# Patient Record
Sex: FEMALE | Race: WHITE | NOT HISPANIC OR LATINO | Employment: OTHER | URBAN - NONMETROPOLITAN AREA
[De-identification: names, ages, dates, MRNs, and addresses within clinical notes are randomized per-mention and may not be internally consistent; named-entity substitution may affect disease eponyms.]

---

## 2017-03-07 ENCOUNTER — APPOINTMENT (OUTPATIENT)
Dept: LAB | Facility: HOSPITAL | Age: 82
End: 2017-03-07

## 2017-03-07 ENCOUNTER — OFFICE VISIT (OUTPATIENT)
Dept: GASTROENTEROLOGY | Facility: CLINIC | Age: 82
End: 2017-03-07

## 2017-03-07 VITALS
OXYGEN SATURATION: 98 % | HEIGHT: 68 IN | HEART RATE: 74 BPM | BODY MASS INDEX: 24.71 KG/M2 | SYSTOLIC BLOOD PRESSURE: 132 MMHG | WEIGHT: 163 LBS | DIASTOLIC BLOOD PRESSURE: 80 MMHG

## 2017-03-07 DIAGNOSIS — Z78.9 NONSMOKER: ICD-10-CM

## 2017-03-07 DIAGNOSIS — I10 HTN (HYPERTENSION), BENIGN: ICD-10-CM

## 2017-03-07 DIAGNOSIS — E11.9 CONTROLLED TYPE 2 DIABETES MELLITUS WITHOUT COMPLICATION, WITHOUT LONG-TERM CURRENT USE OF INSULIN (HCC): ICD-10-CM

## 2017-03-07 DIAGNOSIS — K62.5 RECTAL BLEEDING: ICD-10-CM

## 2017-03-07 DIAGNOSIS — R19.7 DIARRHEA IN ADULT PATIENT: Primary | ICD-10-CM

## 2017-03-07 LAB
ADV 40+41 DNA STL QL NAA+NON-PROBE: NOT DETECTED
ASTRO TYP 1-8 RNA STL QL NAA+NON-PROBE: NOT DETECTED
C CAYETANENSIS DNA STL QL NAA+NON-PROBE: NOT DETECTED
C DIFF TOX GENS STL QL NAA+PROBE: DETECTED
CAMPY SP DNA.DIARRHEA STL QL NAA+PROBE: NOT DETECTED
CRYPTOSP STL CULT: NOT DETECTED
E COLI DNA SPEC QL NAA+PROBE: NOT DETECTED
E HISTOLYT AG STL-ACNC: NOT DETECTED
EAEC PAA PLAS AGGR+AATA ST NAA+NON-PRB: NOT DETECTED
EC STX1+STX2 GENES STL QL NAA+NON-PROBE: NOT DETECTED
EPEC EAE GENE STL QL NAA+NON-PROBE: NOT DETECTED
ETEC LTA+ST1A+ST1B TOX ST NAA+NON-PROBE: NOT DETECTED
G LAMBLIA DNA SPEC QL NAA+PROBE: NOT DETECTED
NOROVIRUS GI+II RNA STL QL NAA+NON-PROBE: NOT DETECTED
P SHIGELLOIDES DNA STL QL NAA+NON-PROBE: NOT DETECTED
RV RNA STL NAA+PROBE: NOT DETECTED
SALMONELLA DNA SPEC QL NAA+PROBE: NOT DETECTED
SAPO I+II+IV+V RNA STL QL NAA+NON-PROBE: NOT DETECTED
SHIGELLA SP+EIEC IPAH ST NAA+NON-PROBE: NOT DETECTED
V CHOLERAE DNA SPEC QL NAA+PROBE: NOT DETECTED
VIBRIO DNA SPEC NAA+PROBE: NOT DETECTED
YERSINIA STL CULT: NOT DETECTED

## 2017-03-07 PROCEDURE — 99204 OFFICE O/P NEW MOD 45 MIN: CPT | Performed by: CLINICAL NURSE SPECIALIST

## 2017-03-07 PROCEDURE — 87507 IADNA-DNA/RNA PROBE TQ 12-25: CPT | Performed by: CLINICAL NURSE SPECIALIST

## 2017-03-07 PROCEDURE — 87205 SMEAR GRAM STAIN: CPT | Performed by: CLINICAL NURSE SPECIALIST

## 2017-03-07 PROCEDURE — 87999 UNLISTED MICROBIOLOGY PX: CPT | Performed by: CLINICAL NURSE SPECIALIST

## 2017-03-07 RX ORDER — UBIDECARENONE 75 MG
CAPSULE ORAL
COMMUNITY
End: 2018-10-25

## 2017-03-07 RX ORDER — CLONIDINE HYDROCHLORIDE 0.2 MG/1
TABLET ORAL
Status: ON HOLD | COMMUNITY
Start: 2015-08-16 | End: 2020-04-02

## 2017-03-07 RX ORDER — M-VIT,TX,IRON,MINS/CALC/FOLIC 27MG-0.4MG
TABLET ORAL
COMMUNITY
End: 2018-10-25

## 2017-03-07 RX ORDER — LISINOPRIL 20 MG/1
TABLET ORAL
Status: ON HOLD | COMMUNITY
End: 2020-04-02

## 2017-03-07 RX ORDER — METHOCARBAMOL 500 MG/1
TABLET, FILM COATED ORAL
COMMUNITY
Start: 2015-08-17 | End: 2018-10-25

## 2017-03-07 RX ORDER — CAYENNE 450 MG
CAPSULE ORAL
COMMUNITY
End: 2018-10-25

## 2017-03-07 RX ORDER — PREGABALIN 50 MG/1
CAPSULE ORAL
COMMUNITY
End: 2018-10-25 | Stop reason: DRUGHIGH

## 2017-03-07 RX ORDER — METFORMIN HYDROCHLORIDE 500 MG/1
TABLET, EXTENDED RELEASE ORAL
Status: ON HOLD | COMMUNITY
End: 2020-04-02

## 2017-03-07 RX ORDER — CLONIDINE HYDROCHLORIDE 0.1 MG/1
TABLET ORAL
COMMUNITY
End: 2018-10-25 | Stop reason: DRUGHIGH

## 2017-03-07 RX ORDER — METOPROLOL SUCCINATE 25 MG/1
TABLET, EXTENDED RELEASE ORAL
COMMUNITY
End: 2021-02-19

## 2017-03-07 RX ORDER — CLOBETASOL PROPIONATE 0.5 MG/G
OINTMENT TOPICAL
COMMUNITY
Start: 2016-11-03 | End: 2018-10-25

## 2017-03-07 NOTE — PROGRESS NOTES
Chief Complaint   Patient presents with   • GI Problem     Diarrhea     Subjective   HPI  Hannah Dasilva is a 87 y.o. female who presents with a complaint of diarrhea with a severe episode on Saturday. She has been having loose bowels worsening over the past year. She says that she will go up to 4 or 5 times. She says that there may have been some BRBPR that was of small amount on the tissue. She denies any abdominal pain. No nausea or vomiting. No recent antibiotics. No NSAIDs. No wt loss. She does take metformin. Her son has a hx of multiple polyps.   She has had a colonoscopy in 2010 reviewed today. She was notified of repeat in 2015 and she did not want to come in.    Past Medical History   Diagnosis Date   • Disc degeneration, lumbar    • History of adenomatous polyp of colon    • Hypercholesterolemia    • Irregular heartbeat    • MVP (mitral valve prolapse)    • Osteoarthritis    • Sciatica      Past Surgical History   Procedure Laterality Date   • Hysterectomy     • Tonsillectomy     • Appendectomy     • Bladder repair     • Colonoscopy  02/23/2010     diverticulosis   • Colonoscopy w/ polypectomy  11/06/2006     ASCENDING COLON ADENOMATOUS POLYP   • Endoscopy  10/04/2006     SBBX UNREMARKABLE, SMALL HH, UREA NEG     Outpatient Prescriptions Marked as Taking for the 3/7/17 encounter (Office Visit) with TOY Loera   Medication Sig Dispense Refill   • CALCIUM CITRATE-VITAMIN D Take  by mouth daily.       • clobetasol (TEMOVATE) 0.05 % ointment Apply topically 2 times daily for one week, then once daily for a week, then twice weekly     • CloNIDine (CATAPRES) 0.1 MG tablet Take 0.1 mg by mouth 2 times daily.       • CloNIDine (CATAPRES) 0.2 MG tablet      • ESTRADIOL, VAGINAL ESTROGENS, Place 25 mcg vaginally three times a week.       • Lactobacillus (ACIDOPHILUS) chewable tablet Take  by mouth.       • lisinopril (PRINIVIL,ZESTRIL) 20 MG tablet Take 20 mg by mouth daily.       • metFORMIN ER  (GLUCOPHAGE-XR) 500 MG 24 hr tablet Take 500 mg by mouth daily (with breakfast).       • methocarbamol (ROBAXIN) 500 MG tablet      • metoprolol succinate XL (TOPROL-XL) 25 MG 24 hr tablet Take 25 mg by mouth daily.       • pregabalin (LYRICA) 50 MG capsule Take 75 mg by mouth 3 times daily.       • therapeutic multivitamin-minerals (THERAGRAN-M) tablet Take 1 tablet by mouth daily.       • vitamin B-12 (CYANOCOBALAMIN) 100 MCG tablet Take 50 mcg by mouth daily.         Allergies   Allergen Reactions   • Penicillins    • Quinine Derivatives    • Sulfa Antibiotics      Social History     Social History   • Marital status:      Spouse name: N/A   • Number of children: N/A   • Years of education: N/A     Occupational History   • Not on file.     Social History Main Topics   • Smoking status: Never Smoker   • Smokeless tobacco: Not on file   • Alcohol use Yes      Comment: wine occasional   • Drug use: No   • Sexual activity: Not on file     Other Topics Concern   • Not on file     Social History Narrative     Family History   Problem Relation Age of Onset   • Colon polyps Son    • GI problems Neg Hx    • Colon cancer Neg Hx      Health Maintenance   Topic Date Due   • MEDICARE ANNUAL WELLNESS  11/27/1929   • TDAP/TD VACCINES (1 - Tdap) 11/27/1948   • ZOSTER VACCINE  11/27/1989   • PNEUMOCOCCAL VACCINES (65+ LOW/MEDIUM RISK) (1 of 2 - PCV13) 11/27/1994   • INFLUENZA VACCINE  08/01/2016     Review of Systems   Constitutional: Negative for activity change, appetite change, chills, diaphoresis, fatigue, fever and unexpected weight change.   HENT: Negative for ear pain, hearing loss, mouth sores, sore throat, trouble swallowing and voice change.    Eyes: Negative.    Respiratory: Negative for cough, choking, shortness of breath and wheezing.    Cardiovascular: Negative for chest pain and palpitations.   Gastrointestinal: Positive for blood in stool and diarrhea. Negative for abdominal pain, constipation, nausea and  "vomiting.   Endocrine: Negative for cold intolerance and heat intolerance.   Genitourinary: Negative for decreased urine volume, dysuria, frequency, hematuria and urgency.   Musculoskeletal: Negative for back pain, gait problem and myalgias.   Skin: Negative for color change, pallor and rash.   Allergic/Immunologic: Negative for food allergies and immunocompromised state.   Neurological: Negative for dizziness, tremors, seizures, syncope, weakness, light-headedness, numbness and headaches.   Hematological: Negative for adenopathy. Does not bruise/bleed easily.   Psychiatric/Behavioral: Negative for agitation and confusion. The patient is not nervous/anxious.    All other systems reviewed and are negative.    Objective   Vitals:    03/07/17 1250   BP: 132/80   Pulse: 74   SpO2: 98%   Weight: 163 lb (73.9 kg)   Height: 68\" (172.7 cm)     Body mass index is 24.78 kg/(m^2).  Physical Exam   Constitutional: She is oriented to person, place, and time. She appears well-developed and well-nourished.   HENT:   Head: Normocephalic and atraumatic.   Eyes: Pupils are equal, round, and reactive to light.   Neck: Normal range of motion. Neck supple. No tracheal deviation present.   Cardiovascular: Normal rate, regular rhythm and normal heart sounds.  Exam reveals no gallop and no friction rub.    No murmur heard.  Pulmonary/Chest: Effort normal and breath sounds normal. No respiratory distress. She has no wheezes. She has no rales. She exhibits no tenderness.   Abdominal: Soft. Bowel sounds are normal. She exhibits no distension. There is no hepatosplenomegaly. There is no tenderness. There is no rigidity, no rebound and no guarding.   Musculoskeletal: Normal range of motion. She exhibits no edema, tenderness or deformity.   Neurological: She is alert and oriented to person, place, and time. She has normal reflexes.   Skin: Skin is warm and dry. No rash noted. No pallor.   Psychiatric: She has a normal mood and affect. Her " behavior is normal. Judgment and thought content normal.     Assessment/Plan   Hannah was seen today for gi problem.    Diagnoses and all orders for this visit:    Diarrhea in adult patient  -     Gastrointestinal Panel, PCR  -     Fecal Leukocytes  -     polyethylene glycol (GoLYTELY) 236 G solution; Take as directed by office instructions.  -     Case Request; Standing  -     Implement Anesthesia Orders Day of Procedure; Standing  -     Obtain Informed Consent; Standing  -     Verify Informed Consent; Standing  -     Verify bowel prep was successful; Standing  -     Case Request    Rectal bleeding  Comments:  small amount on the tissue.     Nonsmoker    HTN (hypertension), benign  Comments:  continue BP medication the am of procedure    Controlled type 2 diabetes mellitus without complication, without long-term current use of insulin    I suggested that this could be her metformin and/or dietary related. I also will get stool cultures to rule out infectious source. The other concern is ischemic colitis. I suggested that she come to the ER with any severe episodes or further bleeding.     COLONOSCOPY WITH ANESTHESIA (N/A)  EMR Dragon/transcription disclaimer: Much of this encounter note is electronic transcription/translation of spoken language to printed text. The electronic translation of spoken language may be erroneous, or at times, nonsensical words or phrases may be inadvertently transcribed. Although I have reviewed the note for such errors, some may still exist.  Body mass index is 24.78 kg/(m^2).  Return if symptoms worsen or fail to improve.      All risks, benefits, alternatives, and indications of colonoscopy and/or Endoscopy procedure have been discussed with the patient. Risks to include perforation of the colon requiring possible surgery or colostomy, risk of bleeding from biopsies or removal of colon tissue, possibility of missing a colon polyp or cancer, or adverse drug reaction.  Benefits to  include the diagnosis and management of disease of the colon and rectum. Alternatives to include barium enema, radiographic evaluation, lab testing or no intervention. Pt verbalizes understanding and agrees.

## 2017-03-08 DIAGNOSIS — A04.72 COLITIS, CLOSTRIDIUM DIFFICILE: Primary | ICD-10-CM

## 2017-03-08 LAB — WBC STL QL MICRO: NORMAL

## 2017-03-08 RX ORDER — METRONIDAZOLE 500 MG/1
500 TABLET ORAL 3 TIMES DAILY
Qty: 30 TABLET | Refills: 0 | Status: SHIPPED | OUTPATIENT
Start: 2017-03-08 | End: 2018-10-25

## 2017-06-13 ENCOUNTER — TELEPHONE (OUTPATIENT)
Dept: GASTROENTEROLOGY | Facility: CLINIC | Age: 82
End: 2017-06-13

## 2017-06-13 NOTE — TELEPHONE ENCOUNTER
Patient called stating now Dr. Mcdermott office is calling her about scheduling colonoscopy she said she informed us already that she isn't having any problems at this time and doesn't want to schedule a colon.

## 2017-07-10 ENCOUNTER — OFFICE VISIT (OUTPATIENT)
Dept: OBGYN | Age: 82
End: 2017-07-10
Payer: MEDICARE

## 2017-07-10 VITALS
HEART RATE: 64 BPM | BODY MASS INDEX: 23.19 KG/M2 | SYSTOLIC BLOOD PRESSURE: 118 MMHG | WEIGHT: 153 LBS | HEIGHT: 68 IN | DIASTOLIC BLOOD PRESSURE: 70 MMHG | TEMPERATURE: 98.7 F

## 2017-07-10 DIAGNOSIS — N76.0 ACUTE VAGINITIS: ICD-10-CM

## 2017-07-10 DIAGNOSIS — Z01.411 ENCOUNTER FOR GYNECOLOGICAL EXAMINATION (GENERAL) (ROUTINE) WITH ABNORMAL FINDINGS: Primary | ICD-10-CM

## 2017-07-10 DIAGNOSIS — L90.0 LICHEN SCLEROSUS: ICD-10-CM

## 2017-07-10 DIAGNOSIS — Z12.31 ENCOUNTER FOR SCREENING MAMMOGRAM FOR MALIGNANT NEOPLASM OF BREAST: ICD-10-CM

## 2017-07-10 PROCEDURE — G0101 CA SCREEN;PELVIC/BREAST EXAM: HCPCS

## 2017-07-10 PROCEDURE — 99213 OFFICE O/P EST LOW 20 MIN: CPT

## 2017-07-10 RX ORDER — CLOBETASOL PROPIONATE 0.5 MG/G
OINTMENT TOPICAL
Qty: 45 G | Refills: 1 | Status: ON HOLD | OUTPATIENT
Start: 2017-07-10 | End: 2017-08-22

## 2017-07-10 RX ORDER — NYSTATIN 100000 U/G
CREAM TOPICAL
Qty: 1 TUBE | Refills: 1 | Status: SHIPPED | OUTPATIENT
Start: 2017-07-10 | End: 2017-07-14 | Stop reason: ALTCHOICE

## 2017-07-10 RX ORDER — FLUCONAZOLE 100 MG/1
100 TABLET ORAL ONCE
Qty: 2 TABLET | Refills: 1 | Status: SHIPPED | OUTPATIENT
Start: 2017-07-10 | End: 2017-07-10

## 2017-07-10 RX ORDER — AMLODIPINE BESYLATE 5 MG/1
5 TABLET ORAL DAILY
COMMUNITY
End: 2018-08-08 | Stop reason: SDUPTHER

## 2017-07-10 ASSESSMENT — ENCOUNTER SYMPTOMS
DIARRHEA: 0
BLOOD IN STOOL: 0
COLOR CHANGE: 0
CONSTIPATION: 0
COUGH: 0
BACK PAIN: 0
SHORTNESS OF BREATH: 0
TROUBLE SWALLOWING: 0

## 2017-07-14 ENCOUNTER — HOSPITAL ENCOUNTER (EMERGENCY)
Age: 82
Discharge: HOME | End: 2017-07-14
Attending: EMERGENCY MEDICINE
Payer: MEDICARE

## 2017-07-14 VITALS
WEIGHT: 154 LBS | HEIGHT: 69 IN | HEART RATE: 78 BPM | DIASTOLIC BLOOD PRESSURE: 78 MMHG | SYSTOLIC BLOOD PRESSURE: 125 MMHG | TEMPERATURE: 98 F | OXYGEN SATURATION: 99 % | BODY MASS INDEX: 22.81 KG/M2 | RESPIRATION RATE: 20 BRPM

## 2017-07-14 DIAGNOSIS — S50.311A ABRASION OF RIGHT ELBOW, INITIAL ENCOUNTER: Primary | ICD-10-CM

## 2017-07-14 LAB
ALBUMIN SERPL-MCNC: 4.4 G/DL (ref 3.5–5.2)
ALP BLD-CCNC: 91 U/L (ref 35–104)
ALT SERPL-CCNC: 19 U/L (ref 5–33)
ANION GAP SERPL CALCULATED.3IONS-SCNC: 15 MMOL/L (ref 7–19)
AST SERPL-CCNC: 21 U/L (ref 5–32)
BASOPHILS ABSOLUTE: 0.1 K/UL (ref 0–0.2)
BASOPHILS RELATIVE PERCENT: 0.4 % (ref 0–1)
BILIRUB SERPL-MCNC: 0.3 MG/DL (ref 0.2–1.2)
BILIRUBIN URINE: NEGATIVE
BLOOD, URINE: NEGATIVE
BUN BLDV-MCNC: 23 MG/DL (ref 8–23)
CALCIUM SERPL-MCNC: 9.7 MG/DL (ref 8.8–10.2)
CHLORIDE BLD-SCNC: 93 MMOL/L (ref 98–111)
CLARITY: CLEAR
CO2: 27 MMOL/L (ref 22–29)
COLOR: YELLOW
CREAT SERPL-MCNC: 0.7 MG/DL (ref 0.5–0.9)
EOSINOPHILS ABSOLUTE: 0.4 K/UL (ref 0–0.6)
EOSINOPHILS RELATIVE PERCENT: 2.8 % (ref 0–5)
GFR NON-AFRICAN AMERICAN: >60
GLUCOSE BLD-MCNC: 138 MG/DL (ref 74–109)
GLUCOSE URINE: NEGATIVE MG/DL
HCT VFR BLD CALC: 39.7 % (ref 37–47)
HEMOGLOBIN: 13.6 G/DL (ref 12–16)
KETONES, URINE: NEGATIVE MG/DL
LEUKOCYTE ESTERASE, URINE: NEGATIVE
LYMPHOCYTES ABSOLUTE: 1.6 K/UL (ref 1.1–4.5)
LYMPHOCYTES RELATIVE PERCENT: 11.5 % (ref 20–40)
MCH RBC QN AUTO: 30.6 PG (ref 27–31)
MCHC RBC AUTO-ENTMCNC: 34.3 G/DL (ref 33–37)
MCV RBC AUTO: 89.4 FL (ref 81–99)
MONOCYTES ABSOLUTE: 0.8 K/UL (ref 0–0.9)
MONOCYTES RELATIVE PERCENT: 5.8 % (ref 0–10)
NEUTROPHILS ABSOLUTE: 10.9 K/UL (ref 1.5–7.5)
NEUTROPHILS RELATIVE PERCENT: 79.1 % (ref 50–65)
NITRITE, URINE: NEGATIVE
PDW BLD-RTO: 13.5 % (ref 11.5–14.5)
PH UA: 8
PLATELET # BLD: 261 K/UL (ref 130–400)
PMV BLD AUTO: 9.1 FL (ref 9.4–12.3)
POTASSIUM SERPL-SCNC: 4.5 MMOL/L (ref 3.5–5)
PROTEIN UA: NEGATIVE MG/DL
RBC # BLD: 4.44 M/UL (ref 4.2–5.4)
SODIUM BLD-SCNC: 135 MMOL/L (ref 136–145)
SPECIFIC GRAVITY UA: 1.01
TOTAL PROTEIN: 7.5 G/DL (ref 6.6–8.7)
UROBILINOGEN, URINE: 0.2 E.U./DL
WBC # BLD: 13.7 K/UL (ref 4.8–10.8)

## 2017-07-14 PROCEDURE — 81003 URINALYSIS AUTO W/O SCOPE: CPT

## 2017-07-14 PROCEDURE — 87086 URINE CULTURE/COLONY COUNT: CPT

## 2017-07-14 PROCEDURE — 85025 COMPLETE CBC W/AUTO DIFF WBC: CPT

## 2017-07-14 PROCEDURE — 99282 EMERGENCY DEPT VISIT SF MDM: CPT | Performed by: EMERGENCY MEDICINE

## 2017-07-14 PROCEDURE — 36415 COLL VENOUS BLD VENIPUNCTURE: CPT

## 2017-07-14 PROCEDURE — 80053 COMPREHEN METABOLIC PANEL: CPT

## 2017-07-14 PROCEDURE — 99283 EMERGENCY DEPT VISIT LOW MDM: CPT

## 2017-07-14 ASSESSMENT — ENCOUNTER SYMPTOMS
DIARRHEA: 0
APNEA: 0
FACIAL SWELLING: 0
SORE THROAT: 0
BLOOD IN STOOL: 0
SINUS PRESSURE: 0
EYE DISCHARGE: 0
CONSTIPATION: 0
VOICE CHANGE: 0
CHOKING: 0
NAUSEA: 0

## 2017-07-16 LAB — URINE CULTURE, ROUTINE: NORMAL

## 2017-08-01 ENCOUNTER — OFFICE VISIT (OUTPATIENT)
Dept: OBGYN | Age: 82
End: 2017-08-01
Payer: MEDICARE

## 2017-08-01 VITALS
TEMPERATURE: 98.7 F | SYSTOLIC BLOOD PRESSURE: 116 MMHG | WEIGHT: 152 LBS | HEIGHT: 69 IN | BODY MASS INDEX: 22.51 KG/M2 | DIASTOLIC BLOOD PRESSURE: 68 MMHG | HEART RATE: 66 BPM

## 2017-08-01 DIAGNOSIS — Z09 FOLLOW-UP EXAM AFTER TREATMENT: Primary | ICD-10-CM

## 2017-08-01 PROCEDURE — 99213 OFFICE O/P EST LOW 20 MIN: CPT

## 2017-08-01 RX ORDER — ESTRADIOL 0.1 MG/G
1 CREAM VAGINAL
Qty: 1 TUBE | Refills: 1 | Status: SHIPPED | OUTPATIENT
Start: 2017-08-03 | End: 2018-04-10 | Stop reason: SDUPTHER

## 2017-08-01 RX ORDER — ESTRADIOL 10 UG/1
10 INSERT VAGINAL DAILY
Qty: 8 TABLET | Refills: 11 | Status: SHIPPED | OUTPATIENT
Start: 2017-08-01 | End: 2018-09-18 | Stop reason: SDUPTHER

## 2017-08-01 RX ORDER — CLOBETASOL PROPIONATE 0.5 MG/G
OINTMENT TOPICAL
Qty: 1 TUBE | Refills: 2 | Status: ON HOLD | OUTPATIENT
Start: 2017-08-01 | End: 2017-08-22

## 2017-08-01 ASSESSMENT — ENCOUNTER SYMPTOMS
COLOR CHANGE: 0
TROUBLE SWALLOWING: 0
BACK PAIN: 0
DIARRHEA: 0
COUGH: 0
CONSTIPATION: 0
BLOOD IN STOOL: 0
SHORTNESS OF BREATH: 0

## 2017-08-02 ENCOUNTER — OFFICE VISIT (OUTPATIENT)
Dept: NEUROLOGY | Age: 82
End: 2017-08-02
Payer: MEDICARE

## 2017-08-02 VITALS
WEIGHT: 155 LBS | BODY MASS INDEX: 23.49 KG/M2 | HEIGHT: 68 IN | DIASTOLIC BLOOD PRESSURE: 60 MMHG | SYSTOLIC BLOOD PRESSURE: 114 MMHG

## 2017-08-02 DIAGNOSIS — G62.9 NEUROPATHY: ICD-10-CM

## 2017-08-02 DIAGNOSIS — Z86.79 HISTORY OF HYPERTENSION: ICD-10-CM

## 2017-08-02 DIAGNOSIS — R55 SYNCOPE, UNSPECIFIED SYNCOPE TYPE: Primary | ICD-10-CM

## 2017-08-02 LAB — VITAMIN B-12: 439 PG/ML (ref 211–946)

## 2017-08-02 PROCEDURE — 99204 OFFICE O/P NEW MOD 45 MIN: CPT | Performed by: PSYCHIATRY & NEUROLOGY

## 2017-08-02 PROCEDURE — 99999 PR DUPLEX SCAN EXTRACRANIAL,LIMITED: CPT | Performed by: PSYCHIATRY & NEUROLOGY

## 2017-08-02 RX ORDER — ZOLPIDEM TARTRATE 5 MG/1
5 TABLET ORAL PRN
COMMUNITY
End: 2022-06-04

## 2017-08-08 ENCOUNTER — TELEPHONE (OUTPATIENT)
Dept: NEUROLOGY | Age: 82
End: 2017-08-08

## 2017-08-08 ENCOUNTER — HOSPITAL ENCOUNTER (OUTPATIENT)
Dept: VASCULAR LAB | Age: 82
Discharge: HOME | End: 2017-08-08
Payer: MEDICARE

## 2017-08-08 DIAGNOSIS — R53.1 WEAKNESS: Primary | ICD-10-CM

## 2017-08-08 DIAGNOSIS — R55 SYNCOPE, UNSPECIFIED SYNCOPE TYPE: ICD-10-CM

## 2017-08-08 PROCEDURE — 93880 EXTRACRANIAL BILAT STUDY: CPT

## 2017-08-17 ENCOUNTER — TELEPHONE (OUTPATIENT)
Dept: NEUROLOGY | Age: 82
End: 2017-08-17

## 2017-08-18 ENCOUNTER — TELEPHONE (OUTPATIENT)
Dept: CARDIOLOGY | Age: 82
End: 2017-08-18

## 2017-08-22 ENCOUNTER — HOSPITAL ENCOUNTER (OUTPATIENT)
Dept: CARDIAC CATH/INVASIVE PROCEDURES | Age: 82
Discharge: HOME | End: 2017-08-22
Attending: INTERNAL MEDICINE | Admitting: INTERNAL MEDICINE
Payer: MEDICARE

## 2017-08-22 VITALS
DIASTOLIC BLOOD PRESSURE: 51 MMHG | TEMPERATURE: 98.3 F | SYSTOLIC BLOOD PRESSURE: 113 MMHG | OXYGEN SATURATION: 95 % | HEIGHT: 69 IN | RESPIRATION RATE: 15 BRPM | WEIGHT: 155 LBS | HEART RATE: 61 BPM | BODY MASS INDEX: 22.96 KG/M2

## 2017-08-22 LAB
ANION GAP SERPL CALCULATED.3IONS-SCNC: 11 MMOL/L (ref 7–19)
BUN BLDV-MCNC: 12 MG/DL (ref 8–23)
CALCIUM SERPL-MCNC: 9.3 MG/DL (ref 8.8–10.2)
CHLORIDE BLD-SCNC: 98 MMOL/L (ref 98–111)
CO2: 27 MMOL/L (ref 22–29)
CREAT SERPL-MCNC: 0.7 MG/DL (ref 0.5–0.9)
GFR NON-AFRICAN AMERICAN: >60
GLUCOSE BLD-MCNC: 101 MG/DL (ref 74–109)
POTASSIUM SERPL-SCNC: 4 MMOL/L (ref 3.5–5)
SODIUM BLD-SCNC: 136 MMOL/L (ref 136–145)

## 2017-08-22 PROCEDURE — 80048 BASIC METABOLIC PNL TOTAL CA: CPT

## 2017-08-22 PROCEDURE — 36415 COLL VENOUS BLD VENIPUNCTURE: CPT

## 2017-08-22 PROCEDURE — 99999 PR OFFICE/OUTPT VISIT,PROCEDURE ONLY: CPT | Performed by: INTERNAL MEDICINE

## 2017-08-22 PROCEDURE — 6360000002 HC RX W HCPCS

## 2017-08-22 PROCEDURE — 33282 HC IMPANTABLE LOOP RECORDER: CPT | Performed by: INTERNAL MEDICINE

## 2017-08-22 PROCEDURE — 2500000003 HC RX 250 WO HCPCS

## 2017-08-22 PROCEDURE — 33282 PR IMPLANTATION PT-ACTIVATED CARDIAC EVENT RECORDER: CPT | Performed by: INTERNAL MEDICINE

## 2017-08-22 PROCEDURE — 6370000000 HC RX 637 (ALT 250 FOR IP): Performed by: INTERNAL MEDICINE

## 2017-08-22 PROCEDURE — 99024 POSTOP FOLLOW-UP VISIT: CPT | Performed by: INTERNAL MEDICINE

## 2017-08-22 PROCEDURE — 93005 ELECTROCARDIOGRAM TRACING: CPT

## 2017-08-22 PROCEDURE — C1764 EVENT RECORDER, CARDIAC: HCPCS

## 2017-08-22 PROCEDURE — 2580000003 HC RX 258: Performed by: INTERNAL MEDICINE

## 2017-08-22 RX ORDER — BIOTIN 1 MG
5000 TABLET ORAL DAILY
COMMUNITY

## 2017-08-22 RX ORDER — CIPROFLOXACIN 2 MG/ML
400 INJECTION, SOLUTION INTRAVENOUS
Status: DISCONTINUED | OUTPATIENT
Start: 2017-08-22 | End: 2017-08-22 | Stop reason: HOSPADM

## 2017-08-22 RX ORDER — MAGNESIUM GLUCONATE 27 MG(500)
500 TABLET ORAL DAILY
COMMUNITY
End: 2018-09-20

## 2017-08-22 RX ORDER — SODIUM CHLORIDE 9 MG/ML
INJECTION, SOLUTION INTRAVENOUS CONTINUOUS
Status: DISCONTINUED | OUTPATIENT
Start: 2017-08-22 | End: 2017-08-22 | Stop reason: HOSPADM

## 2017-08-22 RX ORDER — MELATONIN
1 DAILY
COMMUNITY

## 2017-08-22 RX ORDER — CHLORHEXIDINE GLUCONATE 4 G/100ML
SOLUTION TOPICAL ONCE
Status: COMPLETED | OUTPATIENT
Start: 2017-08-22 | End: 2017-08-22

## 2017-08-22 RX ADMIN — SODIUM CHLORIDE: 9 INJECTION, SOLUTION INTRAVENOUS at 07:51

## 2017-08-22 RX ADMIN — CHLORHEXIDINE GLUCONATE: 213 SOLUTION TOPICAL at 08:00

## 2017-08-23 ENCOUNTER — TELEPHONE (OUTPATIENT)
Dept: CARDIOLOGY | Age: 82
End: 2017-08-23

## 2017-08-23 PROBLEM — Z95.818 STATUS POST PLACEMENT OF IMPLANTABLE LOOP RECORDER: Status: ACTIVE | Noted: 2017-08-22

## 2017-08-24 ENCOUNTER — TELEPHONE (OUTPATIENT)
Dept: CARDIOLOGY | Age: 82
End: 2017-08-24

## 2017-08-24 LAB
EKG P AXIS: 51 DEGREES
EKG P-R INTERVAL: 224 MS
EKG Q-T INTERVAL: 450 MS
EKG QRS DURATION: 128 MS
EKG QTC CALCULATION (BAZETT): 439 MS
EKG T AXIS: 56 DEGREES

## 2017-08-25 ENCOUNTER — TELEPHONE (OUTPATIENT)
Dept: CARDIOLOGY | Age: 82
End: 2017-08-25

## 2017-08-25 DIAGNOSIS — Z95.818 STATUS POST PLACEMENT OF IMPLANTABLE LOOP RECORDER: Primary | ICD-10-CM

## 2017-08-25 DIAGNOSIS — R00.2 PALPITATIONS: ICD-10-CM

## 2017-08-31 DIAGNOSIS — R55 SYNCOPE, UNSPECIFIED SYNCOPE TYPE: ICD-10-CM

## 2017-08-31 DIAGNOSIS — Z95.818 STATUS POST PLACEMENT OF IMPLANTABLE LOOP RECORDER: Primary | ICD-10-CM

## 2017-09-01 ENCOUNTER — NURSE ONLY (OUTPATIENT)
Dept: CARDIOLOGY | Age: 82
End: 2017-09-01

## 2017-09-01 DIAGNOSIS — Z51.89 VISIT FOR WOUND CHECK: Primary | ICD-10-CM

## 2017-09-21 DIAGNOSIS — Z95.818 STATUS POST PLACEMENT OF IMPLANTABLE LOOP RECORDER: Primary | ICD-10-CM

## 2017-09-21 DIAGNOSIS — R55 SYNCOPE, UNSPECIFIED SYNCOPE TYPE: ICD-10-CM

## 2017-09-21 PROCEDURE — 93299 PR REM INTERROG ICPMS/SCRMS <30 D TECH REVIEW: CPT | Performed by: NURSE PRACTITIONER

## 2017-09-21 PROCEDURE — 93298 REM INTERROG DEV EVAL SCRMS: CPT | Performed by: NURSE PRACTITIONER

## 2017-09-28 DIAGNOSIS — Z95.818 STATUS POST PLACEMENT OF IMPLANTABLE LOOP RECORDER: Primary | ICD-10-CM

## 2017-09-28 DIAGNOSIS — R55 SYNCOPE, UNSPECIFIED SYNCOPE TYPE: ICD-10-CM

## 2017-10-23 DIAGNOSIS — R55 SYNCOPE, UNSPECIFIED SYNCOPE TYPE: ICD-10-CM

## 2017-10-23 DIAGNOSIS — Z95.818 STATUS POST PLACEMENT OF IMPLANTABLE LOOP RECORDER: Primary | ICD-10-CM

## 2017-10-23 PROCEDURE — 93298 REM INTERROG DEV EVAL SCRMS: CPT | Performed by: NURSE PRACTITIONER

## 2017-10-23 PROCEDURE — 93299 PR REM INTERROG ICPMS/SCRMS <30 D TECH REVIEW: CPT | Performed by: NURSE PRACTITIONER

## 2017-11-09 DIAGNOSIS — Z95.818 STATUS POST PLACEMENT OF IMPLANTABLE LOOP RECORDER: Primary | ICD-10-CM

## 2017-11-09 DIAGNOSIS — R55 SYNCOPE, UNSPECIFIED SYNCOPE TYPE: ICD-10-CM

## 2017-11-14 ENCOUNTER — TELEPHONE (OUTPATIENT)
Dept: OBGYN | Age: 82
End: 2017-11-14

## 2017-11-14 NOTE — TELEPHONE ENCOUNTER
Pt wants to know if vagifem is 1 daily or twice weekly. Clarified on vm per pt instructions to do so with a message and to call back if she needs futher instructions.

## 2017-11-22 DIAGNOSIS — Z95.818 STATUS POST PLACEMENT OF IMPLANTABLE LOOP RECORDER: Primary | ICD-10-CM

## 2017-11-22 DIAGNOSIS — R55 SYNCOPE, UNSPECIFIED SYNCOPE TYPE: ICD-10-CM

## 2017-12-04 DIAGNOSIS — Z95.818 STATUS POST PLACEMENT OF IMPLANTABLE LOOP RECORDER: Primary | ICD-10-CM

## 2017-12-04 DIAGNOSIS — R55 SYNCOPE, UNSPECIFIED SYNCOPE TYPE: ICD-10-CM

## 2017-12-04 PROCEDURE — 93298 REM INTERROG DEV EVAL SCRMS: CPT | Performed by: NURSE PRACTITIONER

## 2017-12-04 PROCEDURE — 93299 PR REM INTERROG ICPMS/SCRMS <30 D TECH REVIEW: CPT | Performed by: NURSE PRACTITIONER

## 2017-12-12 DIAGNOSIS — Z95.818 STATUS POST PLACEMENT OF IMPLANTABLE LOOP RECORDER: Primary | ICD-10-CM

## 2017-12-12 DIAGNOSIS — R55 SYNCOPE, UNSPECIFIED SYNCOPE TYPE: ICD-10-CM

## 2017-12-19 DIAGNOSIS — Z95.818 STATUS POST PLACEMENT OF IMPLANTABLE LOOP RECORDER: Primary | ICD-10-CM

## 2017-12-19 DIAGNOSIS — R55 SYNCOPE, UNSPECIFIED SYNCOPE TYPE: ICD-10-CM

## 2017-12-28 DIAGNOSIS — Z95.818 STATUS POST PLACEMENT OF IMPLANTABLE LOOP RECORDER: Primary | ICD-10-CM

## 2017-12-28 DIAGNOSIS — R55 SYNCOPE, UNSPECIFIED SYNCOPE TYPE: ICD-10-CM

## 2018-01-15 ENCOUNTER — TELEPHONE (OUTPATIENT)
Dept: CARDIOLOGY | Age: 83
End: 2018-01-15

## 2018-01-15 DIAGNOSIS — Z95.818 STATUS POST PLACEMENT OF IMPLANTABLE LOOP RECORDER: Primary | ICD-10-CM

## 2018-01-15 DIAGNOSIS — R55 SYNCOPE, UNSPECIFIED SYNCOPE TYPE: ICD-10-CM

## 2018-01-15 PROCEDURE — 93298 REM INTERROG DEV EVAL SCRMS: CPT | Performed by: CLINICAL NURSE SPECIALIST

## 2018-01-15 PROCEDURE — 93299 PR REM INTERROG ICPMS/SCRMS <30 D TECH REVIEW: CPT | Performed by: CLINICAL NURSE SPECIALIST

## 2018-01-24 DIAGNOSIS — Z95.818 STATUS POST PLACEMENT OF IMPLANTABLE LOOP RECORDER: Primary | ICD-10-CM

## 2018-01-24 DIAGNOSIS — R55 SYNCOPE, UNSPECIFIED SYNCOPE TYPE: ICD-10-CM

## 2018-02-01 DIAGNOSIS — R55 SYNCOPE, UNSPECIFIED SYNCOPE TYPE: ICD-10-CM

## 2018-02-01 DIAGNOSIS — Z95.818 STATUS POST PLACEMENT OF IMPLANTABLE LOOP RECORDER: Primary | ICD-10-CM

## 2018-02-08 DIAGNOSIS — Z95.818 STATUS POST PLACEMENT OF IMPLANTABLE LOOP RECORDER: Primary | ICD-10-CM

## 2018-02-08 DIAGNOSIS — R55 SYNCOPE, UNSPECIFIED SYNCOPE TYPE: ICD-10-CM

## 2018-02-13 ENCOUNTER — TELEPHONE (OUTPATIENT)
Dept: CARDIOLOGY | Age: 83
End: 2018-02-13

## 2018-05-08 ENCOUNTER — OFFICE VISIT (OUTPATIENT)
Dept: OTOLARYNGOLOGY | Facility: CLINIC | Age: 83
End: 2018-05-08

## 2018-05-08 VITALS
HEART RATE: 68 BPM | DIASTOLIC BLOOD PRESSURE: 87 MMHG | WEIGHT: 158 LBS | TEMPERATURE: 97.6 F | HEIGHT: 68 IN | SYSTOLIC BLOOD PRESSURE: 158 MMHG | RESPIRATION RATE: 16 BRPM | BODY MASS INDEX: 23.95 KG/M2

## 2018-05-08 DIAGNOSIS — E11.9 CONTROLLED TYPE 2 DIABETES MELLITUS WITHOUT COMPLICATION, WITHOUT LONG-TERM CURRENT USE OF INSULIN (HCC): ICD-10-CM

## 2018-05-08 DIAGNOSIS — I10 HTN (HYPERTENSION), BENIGN: ICD-10-CM

## 2018-05-08 DIAGNOSIS — D48.9 NEOPLASM OF UNCERTAIN BEHAVIOR: Primary | ICD-10-CM

## 2018-05-08 PROCEDURE — 99203 OFFICE O/P NEW LOW 30 MIN: CPT | Performed by: OTOLARYNGOLOGY

## 2018-05-08 NOTE — PROGRESS NOTES
YOB: 1929  Location: [facility]  Location Address: [address] [place] [address], [facility], [address] [place] [address]  Location Phone: [phone]    Chief Complaint   Patient presents with   • Mouth Lesions     palate       History of Present Illness  Hannah Dasilva is a 88 y.o. female.  Hannah Dasilva is here for evaluation of ENT complaints. The patient has had problems with an oral lesion  and her dentist referred her after it had been unchanged over the last several months.  She denies complaints.  She denies dysphagia odynophagia or other difficulties recently.  She denies a sore throat. The symptoms are localized to the palate. The patient has had moderate symptoms. The symptoms have been present for the last several weeks The symptoms are aggravated by  no identifiable factors. The symptoms are improved by no identifiable factors.    Noticed by her dentist several weeks ago, her dentist checked it after two weeks and the lesion was not better, thus she was referred to ENT.    Past Medical History:   Diagnosis Date   • Disc degeneration, lumbar    • History of adenomatous polyp of colon    • Hypercholesterolemia    • Irregular heartbeat    • MVP (mitral valve prolapse)    • Osteoarthritis    • Sciatica        Past Surgical History:   Procedure Laterality Date   • APPENDECTOMY     • BLADDER REPAIR     • COLONOSCOPY  2010    diverticulosis   • COLONOSCOPY W/ POLYPECTOMY  2006    ASCENDING COLON ADENOMATOUS POLYP   • ENDOSCOPY  10/04/2006    SBBX UNREMARKABLE, SMALL HH, UREA NEG   • HYSTERECTOMY     • TONSILLECTOMY         Outpatient Prescriptions Marked as Taking for the 18 encounter (Office Visit) with Carlos Anguiano MD   Medication Sig Dispense Refill   • CALCIUM CITRATE-VITAMIN D Take  by mouth daily.       • clobetasol (TEMOVATE) 0.05 % ointment Apply topically 2 times daily for one week, then once daily for a week, then twice weekly     • CloNIDine (CATAPRES) 0.1 MG tablet  Take 0.1 mg by mouth 2 times daily.       • CloNIDine (CATAPRES) 0.2 MG tablet      • ESTRADIOL, VAGINAL ESTROGENS, Place 25 mcg vaginally three times a week.       • Lactobacillus (ACIDOPHILUS) chewable tablet Take  by mouth.       • lisinopril (PRINIVIL,ZESTRIL) 20 MG tablet Take 20 mg by mouth daily.       • metFORMIN ER (GLUCOPHAGE-XR) 500 MG 24 hr tablet Take 500 mg by mouth daily (with breakfast).       • methocarbamol (ROBAXIN) 500 MG tablet      • metoprolol succinate XL (TOPROL-XL) 25 MG 24 hr tablet Take 25 mg by mouth daily.       • pregabalin (LYRICA) 50 MG capsule Take 75 mg by mouth 3 times daily.       • therapeutic multivitamin-minerals (THERAGRAN-M) tablet Take 1 tablet by mouth daily.       • vitamin B-12 (CYANOCOBALAMIN) 100 MCG tablet Take 50 mcg by mouth daily.           Penicillins; Quinine derivatives; Sulfa antibiotics; and Tizanidine    Family History   Problem Relation Age of Onset   • Colon polyps Son    • GI problems Neg Hx    • Colon cancer Neg Hx        Social History     Social History   • Marital status:      Spouse name: N/A   • Number of children: N/A   • Years of education: N/A     Occupational History   • Not on file.     Social History Main Topics   • Smoking status: Never Smoker   • Smokeless tobacco: Never Used   • Alcohol use Yes      Comment: wine occasional   • Drug use: No   • Sexual activity: Not on file     Other Topics Concern   • Not on file     Social History Narrative   • No narrative on file       Review of Systems   Constitutional: Negative for activity change, appetite change, chills, diaphoresis, fatigue, fever and unexpected weight change.   HENT: Positive for mouth sores. Negative for congestion, dental problem, drooling, ear discharge, ear pain, facial swelling, hearing loss, nosebleeds, postnasal drip, rhinorrhea, sinus pressure, sneezing, sore throat, tinnitus, trouble swallowing and voice change.    Eyes: Negative.    Respiratory: Negative.     Cardiovascular: Negative.    Gastrointestinal: Negative.    Endocrine: Negative.    Skin: Negative.    Allergic/Immunologic: Negative for environmental allergies, food allergies and immunocompromised state.   Neurological: Negative.    Hematological: Negative.    Psychiatric/Behavioral: Negative.        Vitals:    05/08/18 1341   BP: 158/87   Pulse: 68   Resp: 16   Temp: 97.6 °F (36.4 °C)       Body mass index is 24.02 kg/m².    Objective     Physical Exam  CONSTITUTIONAL: well nourished, alert, oriented, in no acute distress     COMMUNICATION AND VOICE: able to communicate normally, normal voice quality    HEAD: normocephalic, no lesions, atraumatic, no tenderness, no masses     FACE: appearance normal, no lesions, no tenderness, no deformities, facial motion symmetric    SALIVARY GLANDS: parotid glands with no tenderness, no swelling, no masses, submandibular glands with normal size, nontender    EYES: ocular motility normal, eyelids normal, orbits normal, no proptosis, conjunctiva normal , pupils equal, round     EARS:  Hearing: response to conversational voice normal bilaterally   External Ears: auricles without lesions  Otoscopic: tympanic membrane appearance normal, no lesions, no perforation, normal mobility, no fluid    NOSE:  External Nose: structure normal, no tenderness on palpation, no nasal discharge, no lesions, no evidence of trauma, nostrils patent   Intranasal Exam: nasal mucosa normal, vestibule within normal limits, inferior turbinate normal, nasal septum midline   Nasopharynx:     ORAL:  Lips: upper and lower lips without lesion   Teeth:   Gums: gingivae healthy   Oral Mucosa: oral mucosa normal, no mucosal lesions   Floor of Mouth: Warthin’s duct patent, mucosa normal  Tongue: lingual mucosa normal without lesions, normal tongue mobility   Palate: soft and hard palates with normal mucosa and structure x for small area of erythema posterior velum just to the right of the base of the uvula.  ~3  mm.  Oropharynx: oropharyngeal mucosa normal    NECK: neck appearance normal, no mass,  noted without erythema or tenderness    THYROID: no overt thyromegaly, no tenderness, nodules or mass present on palpation, position midline     LYMPH NODES: no lymphadenopathy    CHEST/RESPIRATORY: respiratory effort normal, normal breath sounds     CARDIOVASCULAR: rate and rhythm normal, extremities without cyanosis or edema      NEUROLOGIC/PSYCHIATRIC: oriented to time, place and person, mood normal, affect appropriate, CN II-XII intact grossly    Assessment/Plan   Problems Addressed this Visit        Cardiovascular and Mediastinum    HTN (hypertension), benign       Endocrine    Controlled type 2 diabetes mellitus without complication, without long-term current use of insulin      Other Visit Diagnoses     Neoplasm of uncertain behavior    -  Primary    Probable hemangioma        * Surgery not found *  No orders of the defined types were placed in this encounter.    Return in about 3 months (around 8/8/2018) for Recheck.       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

## 2018-05-08 NOTE — PATIENT INSTRUCTIONS
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

## 2018-07-10 ENCOUNTER — TELEPHONE (OUTPATIENT)
Dept: CARDIOLOGY | Age: 83
End: 2018-07-10

## 2018-07-10 DIAGNOSIS — R55 SYNCOPE, UNSPECIFIED SYNCOPE TYPE: Primary | ICD-10-CM

## 2018-07-10 RX ORDER — METOPROLOL SUCCINATE 25 MG/1
25 TABLET, EXTENDED RELEASE ORAL 2 TIMES DAILY
Qty: 60 TABLET | Refills: 5 | Status: SHIPPED | OUTPATIENT
Start: 2018-07-10 | End: 2018-10-02 | Stop reason: SDUPTHER

## 2018-07-25 ENCOUNTER — TELEPHONE (OUTPATIENT)
Dept: CARDIOLOGY | Age: 83
End: 2018-07-25

## 2018-07-25 DIAGNOSIS — I10 ESSENTIAL HYPERTENSION: Primary | ICD-10-CM

## 2018-07-25 RX ORDER — LISINOPRIL 20 MG/1
20 TABLET ORAL 2 TIMES DAILY
Qty: 30 TABLET | Refills: 5 | Status: CANCELLED | OUTPATIENT
Start: 2018-07-25

## 2018-07-26 RX ORDER — LISINOPRIL 20 MG/1
20 TABLET ORAL 2 TIMES DAILY
Qty: 30 TABLET | Refills: 5 | Status: SHIPPED | OUTPATIENT
Start: 2018-07-26 | End: 2018-08-08 | Stop reason: SDUPTHER

## 2018-08-08 ENCOUNTER — TELEPHONE (OUTPATIENT)
Dept: CARDIOLOGY | Age: 83
End: 2018-08-08

## 2018-08-08 DIAGNOSIS — I10 ESSENTIAL HYPERTENSION: ICD-10-CM

## 2018-08-08 RX ORDER — LISINOPRIL 20 MG/1
20 TABLET ORAL 2 TIMES DAILY
Qty: 60 TABLET | Refills: 5 | Status: SHIPPED | OUTPATIENT
Start: 2018-08-08 | End: 2018-09-20

## 2018-08-08 RX ORDER — AMLODIPINE BESYLATE 5 MG/1
5 TABLET ORAL DAILY
Qty: 30 TABLET | Refills: 5 | Status: SHIPPED | OUTPATIENT
Start: 2018-08-08 | End: 2019-11-19 | Stop reason: CLARIF

## 2018-09-04 ENCOUNTER — TELEPHONE (OUTPATIENT)
Dept: PODIATRY | Facility: CLINIC | Age: 83
End: 2018-09-04

## 2018-09-20 ENCOUNTER — OFFICE VISIT (OUTPATIENT)
Dept: NEUROLOGY | Age: 83
End: 2018-09-20
Payer: MEDICARE

## 2018-09-20 VITALS
HEIGHT: 68 IN | SYSTOLIC BLOOD PRESSURE: 118 MMHG | DIASTOLIC BLOOD PRESSURE: 64 MMHG | BODY MASS INDEX: 23.04 KG/M2 | WEIGHT: 152 LBS

## 2018-09-20 DIAGNOSIS — Z87.898 HISTORY OF SYNCOPE: ICD-10-CM

## 2018-09-20 DIAGNOSIS — G62.9 NEUROPATHY: Primary | ICD-10-CM

## 2018-09-20 DIAGNOSIS — Z86.79 HISTORY OF HYPERTENSION: ICD-10-CM

## 2018-09-20 PROCEDURE — 99214 OFFICE O/P EST MOD 30 MIN: CPT | Performed by: PSYCHIATRY & NEUROLOGY

## 2018-09-20 PROCEDURE — 4040F PNEUMOC VAC/ADMIN/RCVD: CPT | Performed by: PSYCHIATRY & NEUROLOGY

## 2018-09-20 PROCEDURE — G8427 DOCREV CUR MEDS BY ELIG CLIN: HCPCS | Performed by: PSYCHIATRY & NEUROLOGY

## 2018-09-20 PROCEDURE — 1036F TOBACCO NON-USER: CPT | Performed by: PSYCHIATRY & NEUROLOGY

## 2018-09-20 PROCEDURE — G8420 CALC BMI NORM PARAMETERS: HCPCS | Performed by: PSYCHIATRY & NEUROLOGY

## 2018-09-20 PROCEDURE — 1123F ACP DISCUSS/DSCN MKR DOCD: CPT | Performed by: PSYCHIATRY & NEUROLOGY

## 2018-09-20 PROCEDURE — 1101F PT FALLS ASSESS-DOCD LE1/YR: CPT | Performed by: PSYCHIATRY & NEUROLOGY

## 2018-09-20 PROCEDURE — 1090F PRES/ABSN URINE INCON ASSESS: CPT | Performed by: PSYCHIATRY & NEUROLOGY

## 2018-09-20 RX ORDER — LISINOPRIL 20 MG/1
20 TABLET ORAL DAILY
COMMUNITY
End: 2021-12-03

## 2018-09-20 RX ORDER — DULOXETIN HYDROCHLORIDE 30 MG/1
30 CAPSULE, DELAYED RELEASE ORAL DAILY
COMMUNITY
End: 2021-12-03

## 2018-09-20 RX ORDER — PREGABALIN 150 MG/1
100 CAPSULE ORAL 3 TIMES DAILY
COMMUNITY

## 2018-09-20 RX ORDER — DONEPEZIL HYDROCHLORIDE 5 MG/1
10 TABLET, FILM COATED ORAL NIGHTLY
COMMUNITY

## 2018-09-20 RX ORDER — ST. JOHN'S WORT 300 MG
CAPSULE ORAL
COMMUNITY
End: 2021-12-03

## 2018-10-02 DIAGNOSIS — R55 SYNCOPE, UNSPECIFIED SYNCOPE TYPE: ICD-10-CM

## 2018-10-02 RX ORDER — METOPROLOL SUCCINATE 25 MG/1
TABLET, EXTENDED RELEASE ORAL
Qty: 180 TABLET | Refills: 3 | Status: SHIPPED | OUTPATIENT
Start: 2018-10-02 | End: 2021-12-03

## 2018-10-19 ENCOUNTER — TELEPHONE (OUTPATIENT)
Dept: OBGYN | Age: 83
End: 2018-10-19

## 2018-10-19 RX ORDER — ESTRADIOL 10 UG/1
INSERT VAGINAL
Qty: 8 TABLET | Refills: 5 | Status: SHIPPED | OUTPATIENT
Start: 2018-10-19 | End: 2018-11-16 | Stop reason: SDUPTHER

## 2018-10-25 ENCOUNTER — OFFICE VISIT (OUTPATIENT)
Dept: CARDIOLOGY | Facility: CLINIC | Age: 83
End: 2018-10-25

## 2018-10-25 VITALS
WEIGHT: 150 LBS | DIASTOLIC BLOOD PRESSURE: 60 MMHG | BODY MASS INDEX: 22.73 KG/M2 | SYSTOLIC BLOOD PRESSURE: 108 MMHG | HEIGHT: 68 IN | HEART RATE: 67 BPM

## 2018-10-25 DIAGNOSIS — I10 HTN (HYPERTENSION), BENIGN: ICD-10-CM

## 2018-10-25 DIAGNOSIS — R55 VASOVAGAL SYNCOPE: Primary | ICD-10-CM

## 2018-10-25 DIAGNOSIS — I45.10 RBBB: ICD-10-CM

## 2018-10-25 DIAGNOSIS — I44.4 LAFB (LEFT ANTERIOR FASCICULAR BLOCK): ICD-10-CM

## 2018-10-25 DIAGNOSIS — I45.3 TRIFASCICULAR BLOCK: ICD-10-CM

## 2018-10-25 PROCEDURE — 93000 ELECTROCARDIOGRAM COMPLETE: CPT | Performed by: INTERNAL MEDICINE

## 2018-10-25 PROCEDURE — 99204 OFFICE O/P NEW MOD 45 MIN: CPT | Performed by: INTERNAL MEDICINE

## 2018-10-25 RX ORDER — ZOLPIDEM TARTRATE 10 MG/1
10 TABLET ORAL NIGHTLY PRN
Status: ON HOLD | COMMUNITY
End: 2023-03-05

## 2018-10-25 RX ORDER — AMLODIPINE BESYLATE 5 MG/1
5 TABLET ORAL DAILY
Qty: 90 TABLET | Refills: 3 | Status: ON HOLD | OUTPATIENT
Start: 2018-10-25 | End: 2020-04-02

## 2018-10-25 RX ORDER — PREGABALIN 150 MG/1
75 CAPSULE ORAL 3 TIMES DAILY
Status: ON HOLD | COMMUNITY
End: 2020-04-02

## 2018-10-25 RX ORDER — AMLODIPINE BESYLATE 5 MG/1
5 TABLET ORAL DAILY
COMMUNITY
End: 2018-10-25 | Stop reason: SDUPTHER

## 2018-10-25 RX ORDER — ACETAMINOPHEN 160 MG
2000 TABLET,DISINTEGRATING ORAL 2 TIMES DAILY
COMMUNITY

## 2018-10-25 RX ORDER — DULOXETIN HYDROCHLORIDE 30 MG/1
30 CAPSULE, DELAYED RELEASE ORAL DAILY
Status: ON HOLD | COMMUNITY
End: 2020-04-02

## 2018-10-25 RX ORDER — DONEPEZIL HYDROCHLORIDE 10 MG/1
10 TABLET, FILM COATED ORAL NIGHTLY
COMMUNITY

## 2018-10-25 NOTE — PROGRESS NOTES
"    [facility] - CARDIOLOGY  New Patient Initial Outpatient Eva[facility]    Primary Care Physician: Aga Mcdermott DO    Subjective     Chief Complaint: syncope    History of Present Illness  88 year old previously followed by Dr. Alaniz in [place] after a syncopal episode while flying 2 years ago. She attributes this to dehydration (aggravating factor).  Associated symptoms: none.  Symptom progression: resolved. Symptom progression: resolved. She had an outpatient rhythm monitor at that time, and then had LINQ placed by Dr. Gutierrez August 2017.      Her other complaint is \"hurting\" in her chest that is aggravated by eating lunch.  Lasts for a few hours. Character: burning/tightness. Associated symptoms: no nausea, diaphoresis. Pain is worse when lying down.  Pain relieved by TUMS.     Review of Systems   Constitution: Positive for weakness and malaise/fatigue.   HENT: Negative.  Negative for nosebleeds.    Eyes: Negative.    Cardiovascular: Positive for chest pain and palpitations. Negative for claudication, dyspnea on exertion, irregular heartbeat, leg swelling, near-syncope, orthopnea, paroxysmal nocturnal dyspnea and syncope.   Respiratory: Positive for shortness of breath. Negative for cough and wheezing.    Endocrine: Negative.    Hematologic/Lymphatic: Negative.  Negative for bleeding problem. Does not bruise/bleed easily.   Skin: Negative.    Musculoskeletal: Negative.    Gastrointestinal: Positive for heartburn. Negative for dysphagia, hematemesis, hematochezia and melena.   Genitourinary: Negative.  Negative for hematuria and non-menstrual bleeding.   Psychiatric/Behavioral: Negative.    Allergic/Immunologic: Negative.         Otherwise complete ROS reviewed and negative except as mentioned in the HPI.      Past Medical History:   Past Medical History:   Diagnosis Date   • Diabetes mellitus (CMS/HCC)    • Disc degeneration, lumbar    • History of adenomatous polyp of colon    • Hypercholesterolemia    • " Irregular heartbeat    • LAFB (left anterior fascicular block) 10/26/2018   • MVP (mitral valve prolapse)    • Osteoarthritis    • Sciatica        Past Surgical History:  Past Surgical History:   Procedure Laterality Date   • APPENDECTOMY     • BLADDER REPAIR     • COLONOSCOPY  02/23/2010    diverticulosis   • COLONOSCOPY W/ POLYPECTOMY  11/06/2006    ASCENDING COLON ADENOMATOUS POLYP   • ENDOSCOPY  10/04/2006    SBBX UNREMARKABLE, SMALL HH, UREA NEG   • HYSTERECTOMY     • TONSILLECTOMY         Family History: family history includes Colon polyps in her son; Heart attack in her father; Stroke in her mother.    Social History:  reports that she has never smoked. She has never used smokeless tobacco. She reports that she drinks alcohol. She reports that she does not use drugs.    Medications:  Prior to Admission medications    Medication Sig Start Date End Date Taking? Authorizing Provider   CALCIUM CITRATE-VITAMIN D Take  by mouth daily.      Devyn Lara MD   clobetasol (TEMOVATE) 0.05 % ointment Apply topically 2 times daily for one week, then once daily for a week, then twice weekly 11/3/16   Devyn Lara MD   CloNIDine (CATAPRES) 0.1 MG tablet Take 0.1 mg by mouth 2 times daily.      Devyn Lara MD   CloNIDine (CATAPRES) 0.2 MG tablet  8/16/15   Devyn Lara MD   ESTRADIOL, VAGINAL ESTROGENS, Place 25 mcg vaginally three times a week.      Devyn Lara MD   Lactobacillus (ACIDOPHILUS) chewable tablet Take  by mouth.      Devyn Lara MD   lisinopril (PRINIVIL,ZESTRIL) 20 MG tablet Take 20 mg by mouth daily.      Devyn Lara MD   metFORMIN ER (GLUCOPHAGE-XR) 500 MG 24 hr tablet Take 500 mg by mouth daily (with breakfast).      Devyn Lara MD   methocarbamol (ROBAXIN) 500 MG tablet  8/17/15   Devyn Lara MD   metoprolol succinate XL (TOPROL-XL) 25 MG 24 hr tablet Take 25 mg by mouth daily.      Devyn Lara MD  "  metroNIDAZOLE (FLAGYL) 500 MG tablet Take 1 tablet by mouth 3 (Three) Times a Day. 3/8/17   Aga Hendrickson APRN   polyethylene glycol (GoLYTELY) 236 G solution Take as directed by office instructions. 3/7/17   Aga Hendrickson APRN   pregabalin (LYRICA) 50 MG capsule Take 75 mg by mouth 3 times daily.      Provider, MD Devyn   therapeutic multivitamin-minerals (THERAGRAN-M) tablet Take 1 tablet by mouth daily.      Provider, MD Devyn   vitamin B-12 (CYANOCOBALAMIN) 100 MCG tablet Take 50 mcg by mouth daily.      Provider, MD Devyn     Allergies:  Allergies   Allergen Reactions   • Penicillins Nausea And Vomiting   • Quinine Derivatives Nausea And Vomiting   • Tizanidine Other (See Comments)     Weakness, fever   • Sulfa Antibiotics Rash       Objective     Vital Signs: /60 (BP Location: Right arm, Patient Position: Sitting)   Pulse 67   Ht 172.7 cm (68\")   Wt 68 kg (150 lb)   BMI 22.81 kg/m²     Physical Exam   Constitutional: No distress.   HENT:   Mouth/Throat: Oropharynx is clear. Pharynx is normal.   Neck: Normal range of motion and thyroid normal. Neck supple. No JVD present. No thyromegaly present.   Cardiovascular: Normal rate, regular rhythm, S1 normal, S2 normal, normal heart sounds, intact distal pulses and normal pulses.   No extrasystoles are present. PMI is not displaced.  Exam reveals no gallop.    No murmur heard.  Pulmonary/Chest: Effort normal and breath sounds normal.   Abdominal: Soft. Bowel sounds are normal. She exhibits no distension. There is no splenomegaly or hepatomegaly. There is no tenderness.   Musculoskeletal: She exhibits no edema or tenderness.   Neurological: She is alert and oriented to person, place, and time.   Skin: Skin is warm and dry.       Results Reviewed:      ECG 12 Lead  Date/Time: 10/25/2018 10:36 AM  Performed by: YAA JONES  Authorized by: YAA JONES   Comparison: compared with previous ECG from 3/27/2015  Similar to " previous ECG  Rhythm: sinus bradycardia  BPM: 57  Conduction: right bundle branch block, LAFB and 1st degree  Other findings: LVH  Clinical impression: abnormal ECG            OLD RECORDS REVIEWED:   -LINQ RECORDINGS - one pause of 2.1 seconds on Aug 30, 2017  -Echo from 3/25/15: LVEF 55-60%, diastolic dysfunction, no significant valvular disease  Assessment / Plan        Problem List Items Addressed This Visit        Cardiovascular and Mediastinum    HTN (hypertension), benign    Current Assessment & Plan     Hypertension is well controlled.  Continue current treatment regimen.  Blood pressure will be reassessed by PCP         Relevant Medications    amLODIPine (NORVASC) 5 MG tablet    Other Relevant Orders    ECG 12 Lead    Vasovagal syncope - Primary    Current Assessment & Plan     No recurrences in >2 years, and outpatient rhythm monitoring (including LINQ implant) has not revealed any potential culprits    No need for further monitoring, unless symptoms recur or new ones develop         LAFB (left anterior fascicular block)    RBBB    Trifascicular block    Overview     1ST degree AVB, RBBB, LAFB         Current Assessment & Plan     If this patient has any further syncope that was not felt to be orthostatic or vasovagal in nature, would qualify for pacemaker.  However, she has had an extensive amount of outpatient cardiac rhythm monitoring and has had no evidence of significant AV conduction delay.  Furthermore, she has had no recurrent symptoms of syncope now in a few years.  Please do not hesitate to refer her back to us should any new symptoms develop.             F/u PRN    Mo Jasso MD   10/26/18   2:34 PM

## 2018-10-26 PROBLEM — I45.10 RBBB: Status: ACTIVE | Noted: 2018-10-26

## 2018-10-26 PROBLEM — I44.4 LAFB (LEFT ANTERIOR FASCICULAR BLOCK): Status: ACTIVE | Noted: 2018-10-26

## 2018-10-26 PROBLEM — I45.3 TRIFASCICULAR BLOCK: Status: ACTIVE | Noted: 2018-10-26

## 2018-10-26 NOTE — ASSESSMENT & PLAN NOTE
No recurrences in >2 years, and outpatient rhythm monitoring (including LINQ implant) has not revealed any potential culprits    No need for further monitoring, unless symptoms recur or new ones develop

## 2018-10-26 NOTE — ASSESSMENT & PLAN NOTE
Hypertension is well controlled.  Continue current treatment regimen.  Blood pressure will be reassessed by PCP

## 2018-10-26 NOTE — ASSESSMENT & PLAN NOTE
If this patient has any further syncope that was not felt to be orthostatic or vasovagal in nature, would qualify for pacemaker.  However, she has had an extensive amount of outpatient cardiac rhythm monitoring and has had no evidence of significant AV conduction delay.  Furthermore, she has had no recurrent symptoms of syncope now in a few years.  Please do not hesitate to refer her back to us should any new symptoms develop.

## 2018-10-30 ENCOUNTER — TELEPHONE (OUTPATIENT)
Dept: CARDIOLOGY | Facility: CLINIC | Age: 83
End: 2018-10-30

## 2018-10-30 NOTE — TELEPHONE ENCOUNTER
Lo calling in regarding patients medication.  She states she has been taking herself off of her heart medications and some of her others medications, and is worrying her.   She wants to make another appointment to come in to have someone talk to her about how important some of the meds are that she needs to take especially her heart medications.     I advised patient  sheryls did not have any opening until 12/26/18 but could get her in with the NP 11/15/18 and Lo states she would take the earliest appointment that day, I made appointment for 8 AM.

## 2018-11-16 ENCOUNTER — OFFICE VISIT (OUTPATIENT)
Dept: OBGYN | Age: 83
End: 2018-11-16
Payer: MEDICARE

## 2018-11-16 VITALS
BODY MASS INDEX: 22.07 KG/M2 | SYSTOLIC BLOOD PRESSURE: 107 MMHG | HEIGHT: 69 IN | WEIGHT: 149 LBS | DIASTOLIC BLOOD PRESSURE: 65 MMHG | HEART RATE: 71 BPM

## 2018-11-16 DIAGNOSIS — N90.89 VULVAR LESION: ICD-10-CM

## 2018-11-16 DIAGNOSIS — N95.2 ATROPHIC VAGINITIS: ICD-10-CM

## 2018-11-16 DIAGNOSIS — Z12.72 SCREENING FOR VAGINAL CANCER: ICD-10-CM

## 2018-11-16 DIAGNOSIS — Z01.419 ENCOUNTER FOR WELL WOMAN EXAM WITH ROUTINE GYNECOLOGICAL EXAM: Primary | ICD-10-CM

## 2018-11-16 DIAGNOSIS — N81.11 CYSTOCELE, MIDLINE: ICD-10-CM

## 2018-11-16 DIAGNOSIS — Z12.39 SCREENING FOR BREAST CANCER: ICD-10-CM

## 2018-11-16 PROCEDURE — G8484 FLU IMMUNIZE NO ADMIN: HCPCS | Performed by: NURSE PRACTITIONER

## 2018-11-16 PROCEDURE — 1123F ACP DISCUSS/DSCN MKR DOCD: CPT | Performed by: NURSE PRACTITIONER

## 2018-11-16 PROCEDURE — G0101 CA SCREEN;PELVIC/BREAST EXAM: HCPCS | Performed by: NURSE PRACTITIONER

## 2018-11-16 PROCEDURE — 1101F PT FALLS ASSESS-DOCD LE1/YR: CPT | Performed by: NURSE PRACTITIONER

## 2018-11-16 PROCEDURE — 4040F PNEUMOC VAC/ADMIN/RCVD: CPT | Performed by: NURSE PRACTITIONER

## 2018-11-16 PROCEDURE — G8420 CALC BMI NORM PARAMETERS: HCPCS | Performed by: NURSE PRACTITIONER

## 2018-11-16 PROCEDURE — 99214 OFFICE O/P EST MOD 30 MIN: CPT | Performed by: NURSE PRACTITIONER

## 2018-11-16 PROCEDURE — G8427 DOCREV CUR MEDS BY ELIG CLIN: HCPCS | Performed by: NURSE PRACTITIONER

## 2018-11-16 PROCEDURE — 1036F TOBACCO NON-USER: CPT | Performed by: NURSE PRACTITIONER

## 2018-11-16 PROCEDURE — 1090F PRES/ABSN URINE INCON ASSESS: CPT | Performed by: NURSE PRACTITIONER

## 2018-11-16 RX ORDER — ESTRADIOL 10 UG/1
INSERT VAGINAL
Qty: 8 TABLET | Refills: 5 | Status: SHIPPED | OUTPATIENT
Start: 2018-11-16 | End: 2019-08-20 | Stop reason: SDUPTHER

## 2018-11-16 ASSESSMENT — ENCOUNTER SYMPTOMS
ALLERGIC/IMMUNOLOGIC NEGATIVE: 1
EYES NEGATIVE: 1
GASTROINTESTINAL NEGATIVE: 1
DIARRHEA: 0
CONSTIPATION: 0
RESPIRATORY NEGATIVE: 1

## 2018-11-16 NOTE — LETTER
[id number]  [id number] [facility] [address]  Phone: [phone]  Fax: [phone]    LANCE Jones CNP        November 26, 2018    [address]      Dear Eli Oneal:    The results of your most recent Pap smear are normal. This means that no cancerous or precancerous cells were seen. We recommend that you come back in 2 years for your next routine Pap smear. If you have any questions or concerns, please don't hesitate to call.     Sincerely,            LANCE Jones CNP

## 2018-11-16 NOTE — PROGRESS NOTES
DULoxetine (CYMBALTA) 30 MG extended release capsule Take 30 mg by mouth daily      amLODIPine (NORVASC) 5 MG tablet Take 1 tablet by mouth daily Pt takes at noon 30 tablet 5    Calcium Carbonate Antacid (TUMS PO) Take 400 mg by mouth daily PT TAKES 2 400 MG TABLETS DAILY      Potassium 99 MG TABS Take 99 mg by mouth daily      Biotin 1000 MCG TABS Take 5,000 mcg by mouth daily      GLUCOSAMINE-CHONDROITIN PO Take 1,000 mg by mouth daily      Probiotic Product (PROBIOTIC DAILY PO) Take 1 tablet by mouth daily      Cholecalciferol (VITAMIN D3) 1000 units TABS Take 1 tablet by mouth daily      zolpidem (AMBIEN) 5 MG tablet Take 5 mg by mouth as needed for Sleep      cloNIDine (CATAPRES) 0.2 MG tablet Take 0.2 mg by mouth nightly       metformin (GLUCOPHAGE-XR) 500 MG XR tablet Take 500 mg by mouth 3 times daily       vitamin B-12 (CYANOCOBALAMIN) 100 MCG tablet Take 100 mcg by mouth daily       Alpha Lipoic Acid 200 MG CAPS Take by mouth       No current facility-administered medications for this visit. Allergies   Allergen Reactions    Penicillins Nausea And Vomiting    Quinine Derivatives Nausea And Vomiting    Tizanidine      Weakness, fever    Sulfa Antibiotics Rash     Vitals:    11/16/18 1406   BP: 107/65   Pulse: 71     Body mass index is 22.33 kg/m². Review of Systems   Constitutional: Negative. HENT: Negative. Eyes: Negative. Respiratory: Negative. Cardiovascular: Negative. Gastrointestinal: Negative. Negative for constipation and diarrhea. Endocrine: Negative. Genitourinary: Negative. Negative for frequency, menstrual problem and urgency. Musculoskeletal: Positive for arthralgias. Skin: Negative. Allergic/Immunologic: Negative. Neurological: Negative. Hematological: Negative. Psychiatric/Behavioral: Negative. All other systems reviewed and are negative. Physical Exam   Constitutional: She is oriented to person, place, and time.  She tell you how often to have this done based on your overall health and other things that can increase your risk for heart attack and stroke. · Blood pressure. Have your blood pressure checked during a routine doctor visit. Your doctor will tell you how often to check your blood pressure based on your age, your blood pressure results, and other factors. · Diabetes. Ask your doctor whether you should have tests for diabetes. · Vision. Experts recommend that you have yearly exams for glaucoma and other age-related eye problems. · Hearing. Tell your doctor if you notice any change in your hearing. You can have tests to find out how well you hear. · Colon cancer tests. Keep having colon cancer tests as your doctor recommends. You can have one of several types of tests. · Heart attack and stroke risk. At least every 4 to 6 years, you should have your risk for heart attack and stroke assessed. Your doctor uses factors such as your age, blood pressure, cholesterol, and whether you smoke or have diabetes to show what your risk for a heart attack or stroke is over the next 10 years. · Osteoporosis. Talk to your doctor about whether you should have a bone density test to find out whether you have thinning bones. Also ask your doctor about whether you should take calcium and vitamin D supplements. For women  · Pap test and pelvic exam. You may no longer need a Pap test. Talk with your doctor about whether to stop or continue to have Pap tests. · Breast exam and mammogram. Ask how often you should have a mammogram, which is an X-ray of your breasts. A mammogram can spot breast cancer before it can be felt and when it is easiest to treat. · Thyroid disease. Talk to your doctor about whether to have your thyroid checked as part of a regular physical exam. Women have an increased chance of a thyroid problem.   For men  · Prostate exam. Talk to your doctor about whether you should have a blood test (called a PSA test) for

## 2018-11-26 LAB
HPV TYPE 16: NOT DETECTED
HPV TYPE 18: NOT DETECTED
INTERPRETATION: NORMAL
OTHER HIGH RISK HPV: NOT DETECTED
SOURCE: NORMAL

## 2018-11-27 ENCOUNTER — OFFICE VISIT (OUTPATIENT)
Dept: GASTROENTEROLOGY | Facility: CLINIC | Age: 83
End: 2018-11-27

## 2018-11-27 VITALS
SYSTOLIC BLOOD PRESSURE: 122 MMHG | BODY MASS INDEX: 22.73 KG/M2 | OXYGEN SATURATION: 99 % | HEIGHT: 68 IN | HEART RATE: 63 BPM | DIASTOLIC BLOOD PRESSURE: 78 MMHG | WEIGHT: 150 LBS

## 2018-11-27 DIAGNOSIS — Z78.9 NONSMOKER: ICD-10-CM

## 2018-11-27 DIAGNOSIS — R10.13 DYSPEPSIA: Primary | ICD-10-CM

## 2018-11-27 PROCEDURE — 99212 OFFICE O/P EST SF 10 MIN: CPT | Performed by: CLINICAL NURSE SPECIALIST

## 2018-11-27 NOTE — PROGRESS NOTES
Hannah Dasilva  11/27/1929 11/27/2018  Chief Complaint   Patient presents with   • GI Problem     Epigastric pain      Subjective   HPI  Hannah Dasilva is a 89 y.o. female who presents with a complaint of 2 weeks of indigestion and reflux. She was provided Pepto and this improved. She says that her coffee was not tasting good. She is being careful with her diet. She says that today she is better. No nausea or vomiting. No difficulty swallowing. 2 weeks ago she was having some epigastric discomfort mild to moderate no certain triggers. No alleviating factors. She says that this has resolved at this time. No melena. No BRBPR.    Past Medical History:   Diagnosis Date   • Diabetes mellitus (CMS/HCC)    • Disc degeneration, lumbar    • History of adenomatous polyp of colon    • Hypercholesterolemia    • Irregular heartbeat    • LAFB (left anterior fascicular block) 10/26/2018   • MVP (mitral valve prolapse)    • Osteoarthritis    • Sciatica      Past Surgical History:   Procedure Laterality Date   • APPENDECTOMY     • BLADDER REPAIR     • COLONOSCOPY  02/23/2010    diverticulosis   • COLONOSCOPY W/ POLYPECTOMY  11/06/2006    ASCENDING COLON ADENOMATOUS POLYP   • ENDOSCOPY  10/04/2006    SBBX UNREMARKABLE, SMALL HH, UREA NEG   • HYSTERECTOMY     • TONSILLECTOMY       Outpatient Medications Marked as Taking for the 11/27/18 encounter (Office Visit) with Aga Hendrickson APRN   Medication Sig Dispense Refill   • amLODIPine (NORVASC) 5 MG tablet Take 1 tablet by mouth Daily. 90 tablet 3   • BIOTIN 5000 PO Take  by mouth.     • CALCIUM CITRATE-VITAMIN D Take  by mouth daily.       • Cholecalciferol (VITAMIN D3) 5000 units capsule capsule Take 5,000 Units by mouth Daily.     • CloNIDine (CATAPRES) 0.2 MG tablet      • donepezil (ARICEPT) 10 MG tablet Take 10 mg by mouth Every Night.     • DULoxetine (CYMBALTA) 30 MG capsule Take 30 mg by mouth Daily.     • ESTRADIOL, VAGINAL ESTROGENS, Place 25 mcg vaginally  three times a week.       • lisinopril (PRINIVIL,ZESTRIL) 20 MG tablet Take 20 mg by mouth daily.       • metFORMIN ER (GLUCOPHAGE-XR) 500 MG 24 hr tablet Take 500 mg by mouth daily (with breakfast).       • metoprolol succinate XL (TOPROL-XL) 25 MG 24 hr tablet Take 25 mg by mouth daily.       • pregabalin (LYRICA) 150 MG capsule Take 75 mg by mouth 3 (Three) Times a Day. Or take 100 3 times daily      • Zolpidem Tartrate (AMBIEN PO) Take  by mouth. 5-10 mg at night       Allergies   Allergen Reactions   • Penicillins Nausea And Vomiting   • Quinine Derivatives Nausea And Vomiting   • Tizanidine Other (See Comments)     Weakness, fever   • Sulfa Antibiotics Rash     Social History     Socioeconomic History   • Marital status:      Spouse name: Not on file   • Number of children: Not on file   • Years of education: Not on file   • Highest education level: Not on file   Social Needs   • Financial resource strain: Not on file   • Food insecurity - worry: Not on file   • Food insecurity - inability: Not on file   • Transportation needs - medical: Not on file   • Transportation needs - non-medical: Not on file   Occupational History   • Not on file   Tobacco Use   • Smoking status: Never Smoker   • Smokeless tobacco: Never Used   Substance and Sexual Activity   • Alcohol use: Yes     Comment: wine occasional   • Drug use: No   • Sexual activity: Not on file   Other Topics Concern   • Not on file   Social History Narrative   • Not on file     Family History   Problem Relation Age of Onset   • Colon polyps Son    • Stroke Mother    • Heart attack Father    • GI problems Neg Hx    • Colon cancer Neg Hx      Health Maintenance   Topic Date Due   • URINE MICROALBUMIN  11/27/1929   • TDAP/TD VACCINES (1 - Tdap) 11/27/1948   • ZOSTER VACCINE (1 of 2) 11/27/1979   • PNEUMOCOCCAL VACCINES (65+ LOW/MEDIUM RISK) (1 of 2 - PCV13) 11/27/1994   • MEDICARE ANNUAL WELLNESS  06/12/2017   • LIPID PANEL  06/12/2017   • HEMOGLOBIN  "A1C  05/09/2018   • INFLUENZA VACCINE  08/01/2018     Review of Systems   Constitutional: Negative for activity change, appetite change, chills, diaphoresis, fatigue, fever and unexpected weight change.   HENT: Negative for ear pain, hearing loss, mouth sores, sore throat, trouble swallowing and voice change.    Eyes: Negative.    Respiratory: Negative for cough, choking, shortness of breath and wheezing.    Cardiovascular: Negative for chest pain and palpitations.   Gastrointestinal: Negative for abdominal pain, blood in stool, constipation, diarrhea, nausea and vomiting.   Endocrine: Negative for cold intolerance and heat intolerance.   Genitourinary: Negative for decreased urine volume, dysuria, frequency, hematuria and urgency.   Musculoskeletal: Negative for back pain, gait problem and myalgias.   Skin: Negative for color change, pallor and rash.   Allergic/Immunologic: Negative for food allergies and immunocompromised state.   Neurological: Negative for dizziness, tremors, seizures, syncope, weakness, light-headedness, numbness and headaches.   Hematological: Negative for adenopathy. Does not bruise/bleed easily.   Psychiatric/Behavioral: Negative for agitation and confusion. The patient is not nervous/anxious.    All other systems reviewed and are negative.    Objective   Vitals:    11/27/18 0932   BP: 122/78   Pulse: 63   SpO2: 99%   Weight: 68 kg (150 lb)   Height: 172.7 cm (68\")     Body mass index is 22.81 kg/m².  Physical Exam   Constitutional: She is oriented to person, place, and time. She appears well-developed and well-nourished.   HENT:   Head: Normocephalic and atraumatic.   Eyes: Pupils are equal, round, and reactive to light.   Neck: Normal range of motion. Neck supple. No tracheal deviation present.   Cardiovascular: Normal rate, regular rhythm and normal heart sounds. Exam reveals no gallop and no friction rub.   No murmur heard.  Pulmonary/Chest: Effort normal and breath sounds normal. No " respiratory distress. She has no wheezes. She has no rales. She exhibits no tenderness.   Abdominal: Soft. Bowel sounds are normal. She exhibits no distension. There is no hepatosplenomegaly. There is no tenderness. There is no rigidity, no rebound and no guarding.   Musculoskeletal: Normal range of motion. She exhibits no edema, tenderness or deformity.   Neurological: She is alert and oriented to person, place, and time. She has normal reflexes.   Skin: Skin is warm and dry. No rash noted. No pallor.   Psychiatric: She has a normal mood and affect. Her behavior is normal. Judgment and thought content normal.     Assessment/Plan   Hannah was seen today for gi problem.    Diagnoses and all orders for this visit:    Dyspepsia    Nonsmoker    I have suggested Gaviscon if symptoms return. She agrees. She does not want any procedures at this time. She is 89 and aware of all risks indications, alternatives, and benefits. She desires to see how she does. To call as needed.     EMR Dragon/transcription disclaimer: Much of this encounter note is electronic transcription/translation of spoken language to printed text. The electronic translation of spoken language may be erroneous, or at times, nonsensical words or phrases may be inadvertently transcribed. Although I have reviewed the note for such errors, some may still exist.  Body mass index is 22.81 kg/m².  No Follow-up on file.    Patient's Body mass index is 22.81 kg/m². BMI is within normal parameters. No follow-up required.      All risks, benefits, alternatives, and indications of colonoscopy and/or Endoscopy procedure have been discussed with the patient. Risks to include perforation of the colon requiring possible surgery or colostomy, risk of bleeding from biopsies or removal of colon tissue, possibility of missing a colon polyp or cancer, or adverse drug reaction.  Benefits to include the diagnosis and management of disease of the colon and rectum. Alternatives  to include barium enema, radiographic evaluation, lab testing or no intervention. Pt verbalizes understanding and agrees.     Aga Caryl Hendrickson, APRN  11/27/2018  10:04 AM      Obesity, Adult  Obesity is the condition of having too much total body fat. Being overweight or obese means that your weight is greater than what is considered healthy for your body size. Obesity is determined by a measurement called BMI. BMI is an estimate of body fat and is calculated from height and weight. For adults, a BMI of 30 or higher is considered obese.  Obesity can eventually lead to other health concerns and major illnesses, including:  · Stroke.  · Coronary artery disease (CAD).  · Type 2 diabetes.  · Some types of cancer, including cancers of the colon, breast, uterus, and gallbladder.  · Osteoarthritis.  · High blood pressure (hypertension).  · High cholesterol.  · Sleep apnea.  · Gallbladder stones.  · Infertility problems.  What are the causes?  The main cause of obesity is taking in (consuming) more calories than your body uses for energy. Other factors that contribute to this condition may include:  · Being born with genes that make you more likely to become obese.  · Having a medical condition that causes obesity. These conditions include:  ¨ Hypothyroidism.  ¨ Polycystic ovarian syndrome (PCOS).  ¨ Binge-eating disorder.  ¨ Cushing syndrome.  · Taking certain medicines, such as steroids, antidepressants, and seizure medicines.  · Not being physically active (sedentary lifestyle).  · Living where there are limited places to exercise safely or buy healthy foods.  · Not getting enough sleep.  What increases the risk?  The following factors may increase your risk of this condition:  · Having a family history of obesity.  · Being a woman of -American descent.  · Being a man of  descent.  What are the signs or symptoms?  Having excessive body fat is the main symptom of this condition.  How is this  diagnosed?  This condition may be diagnosed based on:  · Your symptoms.  · Your medical history.  · A physical exam. Your health care provider may measure:  ¨ Your BMI. If you are an adult with a BMI between 25 and less than 30, you are considered overweight. If you are an adult with a BMI of 30 or higher, you are considered obese.  ¨ The distances around your hips and your waist (circumferences). These may be compared to each other to help diagnose your condition.  ¨ Your skinfold thickness. Your health care provider may gently pinch a fold of your skin and measure it.  How is this treated?  Treatment for this condition often includes changing your lifestyle. Treatment may include some or all of the following:  · Dietary changes. Work with your health care provider and a dietitian to set a weight-loss goal that is healthy and reasonable for you. Dietary changes may include eating:  ¨ Smaller portions. A portion size is the amount of a particular food that is healthy for you to eat at one time. This varies from person to person.  ¨ Low-calorie or low-fat options.  ¨ More whole grains, fruits, and vegetables.  · Regular physical activity. This may include aerobic activity (cardio) and strength training.  · Medicine to help you lose weight. Your health care provider may prescribe medicine if you are unable to lose 1 pound a week after 6 weeks of eating more healthily and doing more physical activity.  · Surgery. Surgical options may include gastric banding and gastric bypass. Surgery may be done if:  ¨ Other treatments have not helped to improve your condition.  ¨ You have a BMI of 40 or higher.  ¨ You have life-threatening health problems related to obesity.  Follow these instructions at home:     Eating and drinking     · Follow recommendations from your health care provider about what you eat and drink. Your health care provider may advise you to:  ¨ Limit fast foods, sweets, and processed snack foods.  ¨ Choose  low-fat options, such as low-fat milk instead of whole milk.  ¨ Eat 5 or more servings of fruits or vegetables every day.  ¨ Eat at home more often. This gives you more control over what you eat.  ¨ Choose healthy foods when you eat out.  ¨ Learn what a healthy portion size is.  ¨ Keep low-fat snacks on hand.  ¨ Avoid sugary drinks, such as soda, fruit juice, iced tea sweetened with sugar, and flavored milk.  ¨ Eat a healthy breakfast.  · Drink enough water to keep your urine clear or pale yellow.  · Do not go without eating for long periods of time (do not fast) or follow a fad diet. Fasting and fad diets can be unhealthy and even dangerous.  Physical Activity   · Exercise regularly, as told by your health care provider. Ask your health care provider what types of exercise are safe for you and how often you should exercise.  · Warm up and stretch before being active.  · Cool down and stretch after being active.  · Rest between periods of activity.  Lifestyle   · Limit the time that you spend in front of your TV, computer, or video game system.  · Find ways to reward yourself that do not involve food.  · Limit alcohol intake to no more than 1 drink a day for nonpregnant women and 2 drinks a day for men. One drink equals 12 oz of beer, 5 oz of wine, or 1½ oz of hard liquor.  General instructions   · Keep a weight loss journal to keep track of the food you eat and how much you exercise you get.  · Take over-the-counter and prescription medicines only as told by your health care provider.  · Take vitamins and supplements only as told by your health care provider.  · Consider joining a support group. Your health care provider may be able to recommend a support group.  · Keep all follow-up visits as told by your health care provider. This is important.  Contact a health care provider if:  · You are unable to meet your weight loss goal after 6 weeks of dietary and lifestyle changes.  This information is not intended to  replace advice given to you by your health care provider. Make sure you discuss any questions you have with your health care provider.  Document Released: 01/25/2006 Document Revised: 05/22/2017 Document Reviewed: 10/05/2016  [employer] Interactive Patient Education © 2017 [employer]      If you smoke or use tobacco, 4 minutes reading provided  Steps to Quit Smoking  Smoking tobacco can be harmful to your health and can affect almost every organ in your body. Smoking puts you, and those around you, at risk for developing many serious chronic diseases. Quitting smoking is difficult, but it is one of the best things that you can do for your health. It is never too late to quit.  What are the benefits of quitting smoking?  When you quit smoking, you lower your risk of developing serious diseases and conditions, such as:  · Lung cancer or lung disease, such as COPD.  · Heart disease.  · Stroke.  · Heart attack.  · Infertility.  · Osteoporosis and bone fractures.  Additionally, symptoms such as coughing, wheezing, and shortness of breath may get better when you quit. You may also find that you get sick less often because your body is stronger at fighting off colds and infections. If you are pregnant, quitting smoking can help to reduce your chances of having a baby of low birth weight.  How do I get ready to quit?  When you decide to quit smoking, create a plan to make sure that you are successful. Before you quit:  · Pick a date to quit. Set a date within the next two weeks to give you time to prepare.  · Write down the reasons why you are quitting. Keep this list in places where you will see it often, such as on your bathroom mirror or in your car or wallet.  · Identify the people, places, things, and activities that make you want to smoke (triggers) and avoid them. Make sure to take these actions:  ¨ Throw away all cigarettes at home, at work, and in your car.  ¨ Throw away smoking accessories, such as ashtrays and  lighters.  ¨ Clean your car and make sure to empty the ashtray.  ¨ Clean your home, including curtains and carpets.  · Tell your family, friends, and coworkers that you are quitting. Support from your loved ones can make quitting easier.  · Talk with your health care provider about your options for quitting smoking.  · Find out what treatment options are covered by your health insurance.  What strategies can I use to quit smoking?  Talk with your healthcare provider about different strategies to quit smoking. Some strategies include:  · Quitting smoking altogether instead of gradually lessening how much you smoke over a period of time. Research shows that quitting “cold turkey” is more successful than gradually quitting.  · Attending in-person counseling to help you build problem-solving skills. You are more likely to have success in quitting if you attend several counseling sessions. Even short sessions of 10 minutes can be effective.  · Finding resources and support systems that can help you to quit smoking and remain smoke-free after you quit. These resources are most helpful when you use them often. They can include:  ¨ Online chats with a counselor.  ¨ Telephone quitlines.  ¨ Printed self-help materials.  ¨ Support groups or group counseling.  ¨ Text messaging programs.  ¨ Mobile phone applications.  · Taking medicines to help you quit smoking. (If you are pregnant or breastfeeding, talk with your health care provider first.) Some medicines contain nicotine and some do not. Both types of medicines help with cravings, but the medicines that include nicotine help to relieve withdrawal symptoms. Your health care provider may recommend:  ¨ Nicotine patches, gum, or lozenges.  ¨ Nicotine inhalers or sprays.  ¨ Non-nicotine medicine that is taken by mouth.  Talk with your health care provider about combining strategies, such as taking medicines while you are also receiving in-person counseling. Using these two  strategies together makes you more likely to succeed in quitting than if you used either strategy on its own.  If you are pregnant or breastfeeding, talk with your health care provider about finding counseling or other support strategies to quit smoking. Do not take medicine to help you quit smoking unless told to do so by your health care provider.  What things can I do to make it easier to quit?  Quitting smoking might feel overwhelming at first, but there is a lot that you can do to make it easier. Take these important actions:  · Reach out to your family and friends and ask that they support and encourage you during this time. Call telephone quitlines, reach out to support groups, or work with a counselor for support.  · Ask people who smoke to avoid smoking around you.  · Avoid places that trigger you to smoke, such as bars, parties, or smoke-break areas at work.  · Spend time around people who do not smoke.  · Lessen stress in your life, because stress can be a smoking trigger for some people. To lessen stress, try:  ¨ Exercising regularly.  ¨ Deep-breathing exercises.  ¨ Yoga.  ¨ Meditating.  ¨ Performing a body scan. This involves closing your eyes, scanning your body from head to toe, and noticing which parts of your body are particularly tense. Purposefully relax the muscles in those areas.  · Download or purchase mobile phone or tablet apps (applications) that can help you stick to your quit plan by providing reminders, tips, and encouragement. There are many free apps, such as QuitGuide from the CDC (Centers for Disease Control and Prevention). You can find other support for quitting smoking (smoking cessation) through smokefree.gov and other websites.  How will I feel when I quit smoking?  Within the first 24 hours of quitting smoking, you may start to feel some withdrawal symptoms. These symptoms are usually most noticeable 2-3 days after quitting, but they usually do not last beyond 2-3 weeks. Changes  or symptoms that you might experience include:  · Mood swings.  · Restlessness, anxiety, or irritation.  · Difficulty concentrating.  · Dizziness.  · Strong cravings for sugary foods in addition to nicotine.  · Mild weight gain.  · Constipation.  · Nausea.  · Coughing or a sore throat.  · Changes in how your medicines work in your body.  · A depressed mood.  · Difficulty sleeping (insomnia).  After the first 2-3 weeks of quitting, you may start to notice more positive results, such as:  · Improved sense of smell and taste.  · Decreased coughing and sore throat.  · Slower heart rate.  · Lower blood pressure.  · Clearer skin.  · The ability to breathe more easily.  · Fewer sick days.  Quitting smoking is very challenging for most people. Do not get discouraged if you are not successful the first time. Some people need to make many attempts to quit before they achieve long-term success. Do your best to stick to your quit plan, and talk with your health care provider if you have any questions or concerns.  This information is not intended to replace advice given to you by your health care provider. Make sure you discuss any questions you have with your health care provider.  Document Released: 12/12/2002 Document Revised: 08/15/2017 Document Reviewed: 05/03/2016  [employer] Interactive Patient Education © 2017 [employer]

## 2018-12-02 ENCOUNTER — OFFICE VISIT (OUTPATIENT)
Dept: RETAIL CLINIC | Facility: CLINIC | Age: 83
End: 2018-12-02

## 2018-12-02 VITALS
RESPIRATION RATE: 16 BRPM | HEART RATE: 74 BPM | DIASTOLIC BLOOD PRESSURE: 81 MMHG | WEIGHT: 146 LBS | TEMPERATURE: 97.7 F | SYSTOLIC BLOOD PRESSURE: 138 MMHG | OXYGEN SATURATION: 98 % | BODY MASS INDEX: 22.2 KG/M2

## 2018-12-02 DIAGNOSIS — J20.9 ACUTE BRONCHITIS, UNSPECIFIED ORGANISM: Primary | ICD-10-CM

## 2018-12-02 PROCEDURE — 99213 OFFICE O/P EST LOW 20 MIN: CPT | Performed by: NURSE PRACTITIONER

## 2018-12-02 RX ORDER — AZITHROMYCIN 250 MG/1
TABLET, FILM COATED ORAL
Qty: 6 TABLET | Refills: 0 | Status: SHIPPED | OUTPATIENT
Start: 2018-12-02 | End: 2019-01-07

## 2018-12-02 NOTE — PROGRESS NOTES
"Davie Dasilva is a 89 y.o. female.     Began a couple weeks ago, with the remodel of a bathroom; \"all the dust flying in the air. And they did the cutting in the garage, so there's dust thick all over everything that I've been cleaning.  My cough isn't bad but it's deep, and what I'm expectorating is thick yellow.\"      Cough   This is a new problem. The current episode started 1 to 4 weeks ago. The problem has been gradually worsening. The problem occurs every few hours. The cough is productive of purulent sputum. Pertinent negatives include no chest pain, chills, ear pain, fever, headaches, hemoptysis, nasal congestion, sore throat, shortness of breath or wheezing. Nothing aggravates the symptoms. She has tried nothing for the symptoms.        The following portions of the patient's history were reviewed and updated as appropriate: allergies, current medications, past family history, past medical history, past social history, past surgical history and problem list.    Review of Systems   Constitutional: Negative for chills, fatigue and fever.   HENT: Negative for ear pain and sore throat.    Respiratory: Positive for cough. Negative for hemoptysis, chest tightness, shortness of breath and wheezing.    Cardiovascular: Negative for chest pain.   Gastrointestinal: Negative for nausea and vomiting.   Neurological: Negative for dizziness and headache.       Objective      /81   Pulse 74   Temp 97.7 °F (36.5 °C)   Resp 16   Wt 66.2 kg (146 lb)   SpO2 98%   BMI 22.20 kg/m²     Physical Exam   Constitutional: She is oriented to person, place, and time. She appears well-developed and well-nourished. No distress.   HENT:   Head: Normocephalic and atraumatic.   Right Ear: External ear normal.   Left Ear: External ear normal.   Hearing aid left ear   Eyes: Conjunctivae and EOM are normal. Pupils are equal, round, and reactive to light.   Neck: Normal range of motion. Neck supple.   Cardiovascular: " Normal rate and regular rhythm.   Pulmonary/Chest: Effort normal and breath sounds normal.   Musculoskeletal: Normal range of motion.   Neurological: She is alert and oriented to person, place, and time. No cranial nerve deficit.   Skin: Skin is warm and dry. Capillary refill takes less than 2 seconds.   Psychiatric: She has a normal mood and affect. Her behavior is normal. Judgment and thought content normal.   Nursing note and vitals reviewed.        Assessment/Plan   Hannah was seen today for cough.    Diagnoses and all orders for this visit:    Acute bronchitis, unspecified organism    Other orders  -     azithromycin (ZITHROMAX Z-LU) 250 MG tablet; Take 2 tablets the first day, then 1 tablet daily for 4 days.    Return to see your Primary Care Provider if not improving in 2-3 days for recheck.    TOY Booker

## 2019-01-03 NOTE — PROGRESS NOTES
[facility] - PODIATRY    Today's Date: 01/07/19    Patient Name: Hannah Dasilva  MRN: [id number]  CSN: [id number]  PCP: Aga Mcdermott DO  Referring Provider: Aga Mcdermott,*    SUBJECTIVE     Chief Complaint   Patient presents with   • Establish Care     Patient is here to establish care. Patient c/o bunions, calluses and possible heel spur in left foot. Denies significant pain at present time.   • Diabetes     Last stated blood sugar level of 145mg/dl. PCP: Dr. Aga Mcdermott last visit 11/13/2018     HPI: Hannah Dasilva, a 89 y.o.female, comes to clinic as a(n) new patient presenting for diabetic foot exam, complaining of foot pain and fungal toenails. Patient has h/o DM2, DJD, hypercholesterolemia, LAFB, MVP, OA, Sciatica. Patient is NIDDM with last stated BG level of 145mg/dl. Admits to numbness and tingling in feet. Has previously taken Lyrica but notes minimal relief. Denies using Gabapentin. Denies open wounds or sores to feet. Complaints of skin lesions between toes that can become painful when wearing shoes and walking. She occasionally tries to trim the areas down. Also notes thickening and discoloration of toenails. She is interested in available treatments but does not want to take another pill. Denies pain today. Relates previous treatment(s) including foot care by podiatrist. Takes Lyrica for neuropathy. Toe spacers and pads. Denies any constitutional symptoms. No other pedal complaints at this time.    Past Medical History:   Diagnosis Date   • Diabetes mellitus (CMS/HCC)    • Disc degeneration, lumbar    • History of adenomatous polyp of colon    • Hypercholesterolemia    • Irregular heartbeat    • LAFB (left anterior fascicular block) 10/26/2018   • MVP (mitral valve prolapse)    • Osteoarthritis    • Sciatica      Past Surgical History:   Procedure Laterality Date   • APPENDECTOMY     • BLADDER REPAIR     • COLONOSCOPY  02/23/2010    diverticulosis   •  COLONOSCOPY W/ POLYPECTOMY  11/06/2006    ASCENDING COLON ADENOMATOUS POLYP   • ENDOSCOPY  10/04/2006    SBBX UNREMARKABLE, SMALL HH, UREA NEG   • HYSTERECTOMY     • TONSILLECTOMY       Family History   Problem Relation Age of Onset   • Colon polyps Son    • Stroke Mother    • Heart attack Father    • GI problems Neg Hx    • Colon cancer Neg Hx      Social History     Socioeconomic History   • Marital status:      Spouse name: Not on file   • Number of children: Not on file   • Years of education: Not on file   • Highest education level: Not on file   Social Needs   • Financial resource strain: Not on file   • Food insecurity - worry: Not on file   • Food insecurity - inability: Not on file   • Transportation needs - medical: Not on file   • Transportation needs - non-medical: Not on file   Occupational History   • Not on file   Tobacco Use   • Smoking status: Never Smoker   • Smokeless tobacco: Never Used   Substance and Sexual Activity   • Alcohol use: Yes     Comment: wine occasional   • Drug use: No   • Sexual activity: Not on file   Other Topics Concern   • Not on file   Social History Narrative   • Not on file     Allergies   Allergen Reactions   • Penicillins Nausea And Vomiting   • Quinine Derivatives Nausea And Vomiting   • Tizanidine Other (See Comments)     Weakness, fever   • Sulfa Antibiotics Rash     Current Outpatient Medications   Medication Sig Dispense Refill   • amLODIPine (NORVASC) 5 MG tablet Take 1 tablet by mouth Daily. 90 tablet 3   • BIOTIN 5000 PO Take  by mouth.     • CALCIUM CITRATE-VITAMIN D Take  by mouth daily.       • Cholecalciferol (VITAMIN D3) 5000 units capsule capsule Take 5,000 Units by mouth Daily.     • CloNIDine (CATAPRES) 0.2 MG tablet      • donepezil (ARICEPT) 10 MG tablet Take 10 mg by mouth Every Night.     • DULoxetine (CYMBALTA) 30 MG capsule Take 30 mg by mouth Daily.     • ESTRADIOL, VAGINAL ESTROGENS, Place 25 mcg vaginally three times a week.       •  lisinopril (PRINIVIL,ZESTRIL) 20 MG tablet Take 20 mg by mouth daily.       • metFORMIN ER (GLUCOPHAGE-XR) 500 MG 24 hr tablet Take 500 mg by mouth daily (with breakfast).       • metoprolol succinate XL (TOPROL-XL) 25 MG 24 hr tablet Take 25 mg by mouth daily.       • pregabalin (LYRICA) 150 MG capsule Take 75 mg by mouth 3 (Three) Times a Day. Or take 100 3 times daily      • Zolpidem Tartrate (AMBIEN PO) Take  by mouth. 5-10 mg at night     • ciclopirox (PENLAC) 8 % solution Apply  topically to the appropriate area as directed Every Night for 336 days. Apply as directed to affected toenails. 6 mL 6     No current facility-administered medications for this visit.      Review of Systems   Constitutional: Negative for chills and fever.   HENT: Negative for congestion.    Respiratory: Negative for shortness of breath.    Cardiovascular: Negative for chest pain and leg swelling.   Gastrointestinal: Negative for constipation, diarrhea, nausea and vomiting.   Musculoskeletal: Positive for arthralgias.        Foot pain   Skin: Negative for wound.   Neurological: Positive for numbness.       OBJECTIVE     Vitals:    01/07/19 1324   BP: 116/66   Pulse: 70   SpO2: 96%       PHYSICAL EXAM  GEN:   Accompanied by none.     General Exam  Diabetic Foot Exam: performed  Appearance: appears stated age and healthy   Orientation: alert and oriented to person, place, and time   Affect: appropriate   Gait: unimpaired   Assistance: independent   Shoe Gear: casual shoes    Foot/Ankle Exam  Inspection and Palpation  Ecchymosis - Right: none Left: none  Arch - Right: normal Left: normal  Hammertoes - Right: second toe Left: second toe  Claw Toes - Right: absent Left: absent  Mallet Toes - Right: absent Left: absent  Hallux Valgus - Right: yes Left: yes  Hallux Limitus - Left: no  Edema - Right: +1 Left: +1  Skin - Right: skin intact  Left: corn (medial 2nd toe)   Nails -  Right: onychomycosis (hallux) Left: onychomycosis (hallux)      Vascular  Dorsalis Pedis - Right: 2+ Left: 2+  Posterior Tibial - Right: 2+ Left: 2+  Skin Temperature -  Right: warm  Left: warm    CFT - Right: 3 Left: 3  Pedal Hair Growth - Right: present Left: present  Varicosities -  Right: mild varicosities  Left: mild varicosities      Neurologic  Saphenous Nerve Sensation  - Right: normal Left: normal  Tibial Nerve Sensation - Right: diminished Left: diminished  Superficial Peroneal Nerve Sensation - Right: diminished Left: diminished  Deep Peroneal Nerve Sensation - Right: diminished Left: diminished  Sural Nerve Sensation - Right: diminished Left: diminished  Protective Sensation using Newell-Vlad Monofilament - Right: 3 Left: 3    Muscle Strength  Dorsiflexion - Right: 5 Left: 5  Plantar Flexion - Right: 5 Left: 5  Eversion - Right: 5 Left: 5  Inversion - Right: 5 Left: 5  Great Toe Extension - Right: 5 Left: 5  Great Toe Flexion - Right: 5 Left: 5    Range of Motion  Normal right ankle ROM  Normal left ankle ROM    Right Foot/Ankle Comments  Decreased plantar heel fat pad. Prominent tubercle.   Left Foot/Ankle Comments  Decreased plantar heel fat pad. Prominent tubercle.         RADIOLOGY/NUCLEAR:  No results found.    LABORATORY/CULTURE RESULTS:      PATHOLOGY RESULTS:       ASSESSMENT/PLAN     Hannah was seen today for establish care and diabetes.    Diagnoses and all orders for this visit:    Onychomycosis    Diabetic foot (CMS/HCC)    Hallux valgus, right    Hallux valgus, left    Hammer toes of both feet    Foot callus    Plantar fat pad atrophy    Other orders  -     ciclopirox (PENLAC) 8 % solution; Apply  topically to the appropriate area as directed Every Night for 336 days. Apply as directed to affected toenails.      Comprehensive lower extremity examination and evaluation was performed.  Discussed findings and treatment plan including risks, benefits, and treatment options with patient in detail. Patient agreed with treatment plan.  Patient may  maintain nails and calluses at home utilizing emery board or pumice stone between visits as needed  Reviewed at home diabetic foot care including daily foot checks   Patient to purchase Heel Cups. Use toes spacers.  Advised to use ciclopirox x9 months  Defer to PCP for possible change from Lyrica to Gabapentin.  Patient defers compounded pain cream.  An After Visit Summary was printed and given to the patient at discharge, including (if requested) any available informative/educational handouts regarding diagnosis, treatment, or medications. All questions were answered to patient/family satisfaction. Should symptoms fail to improve or worsen they agree to call or return to clinic or to go to the Emergency Department. Discussed the importance of following up with any needed screening tests/labs/specialist appointments and any requested follow-up recommended by me today. Importance of maintaining follow-up discussed and patient accepts that missed appointments can delay diagnosis and potentially lead to worsening of conditions.  Return if symptoms worsen or fail to improve., or sooner if acute issues arise.        This document has been electronically signed by Nilo Gomez DPM on January 7, 2019 7:05 PM

## 2019-01-04 ENCOUNTER — TELEPHONE (OUTPATIENT)
Dept: PODIATRY | Facility: CLINIC | Age: 84
End: 2019-01-04

## 2019-01-04 NOTE — TELEPHONE ENCOUNTER
Called to remind patient of appointment with Dr. Kwesi Gomez on January 7, 2019 at 1:00 pm. Left message with callback number included at this time.

## 2019-01-07 ENCOUNTER — OFFICE VISIT (OUTPATIENT)
Dept: PODIATRY | Facility: CLINIC | Age: 84
End: 2019-01-07

## 2019-01-07 VITALS
BODY MASS INDEX: 22.13 KG/M2 | SYSTOLIC BLOOD PRESSURE: 116 MMHG | HEART RATE: 70 BPM | DIASTOLIC BLOOD PRESSURE: 66 MMHG | OXYGEN SATURATION: 96 % | WEIGHT: 146 LBS | HEIGHT: 68 IN

## 2019-01-07 DIAGNOSIS — M20.41 HAMMER TOES OF BOTH FEET: ICD-10-CM

## 2019-01-07 DIAGNOSIS — L84 FOOT CALLUS: ICD-10-CM

## 2019-01-07 DIAGNOSIS — M20.42 HAMMER TOES OF BOTH FEET: ICD-10-CM

## 2019-01-07 DIAGNOSIS — E11.8 DIABETIC FOOT (HCC): ICD-10-CM

## 2019-01-07 DIAGNOSIS — M20.11 HALLUX VALGUS, RIGHT: ICD-10-CM

## 2019-01-07 DIAGNOSIS — M20.12 HALLUX VALGUS, LEFT: ICD-10-CM

## 2019-01-07 DIAGNOSIS — B35.1 ONYCHOMYCOSIS: Primary | ICD-10-CM

## 2019-01-07 DIAGNOSIS — L90.9 PLANTAR FAT PAD ATROPHY: ICD-10-CM

## 2019-01-07 PROCEDURE — 99203 OFFICE O/P NEW LOW 30 MIN: CPT | Performed by: PODIATRIST

## 2019-01-07 NOTE — PATIENT INSTRUCTIONS
Diabetes and Foot Care  Diabetes may cause you to have problems because of poor blood supply (circulation) to your feet and legs. This may cause the skin on your feet to become thinner, break easier, and heal more slowly. Your skin may become dry, and the skin may peel and crack. You may also have nerve damage in your legs and feet causing decreased feeling in them. You may not notice minor injuries to your feet that could lead to infections or more serious problems. Taking care of your feet is one of the most important things you can do for yourself.  Follow these instructions at home:  · Wear shoes at all times, even in the house. Do not go barefoot. Bare feet are easily injured.  · Check your feet daily for blisters, cuts, and redness. If you cannot see the bottom of your feet, use a mirror or ask someone for help.  · Wash your feet with warm water (do not use hot water) and mild soap. Then pat your feet and the areas between your toes until they are completely dry. Do not soak your feet as this can dry your skin.  · Apply a moisturizing lotion or petroleum jelly (that does not contain alcohol and is unscented) to the skin on your feet and to dry, brittle toenails. Do not apply lotion between your toes.  · Trim your toenails straight across. Do not dig under them or around the cuticle. File the edges of your nails with an emery board or nail file.  · Do not cut corns or calluses or try to remove them with medicine.  · Wear clean socks or stockings every day. Make sure they are not too tight. Do not wear knee-high stockings since they may decrease blood flow to your legs.  · Wear shoes that fit properly and have enough cushioning. To break in new shoes, wear them for just a few hours a day. This prevents you from injuring your feet. Always look in your shoes before you put them on to be sure there are no objects inside.  · Do not cross your legs. This may decrease the blood flow to your feet.  · If you find a  minor scrape, cut, or break in the skin on your feet, keep it and the skin around it clean and dry. These areas may be cleansed with mild soap and water. Do not cleanse the area with peroxide, alcohol, or iodine.  · When you remove an adhesive bandage, be sure not to damage the skin around it.  · If you have a wound, look at it several times a day to make sure it is healing.  · Do not use heating pads or hot water bottles. They may burn your skin. If you have lost feeling in your feet or legs, you may not know it is happening until it is too late.  · Make sure your health care provider performs a complete foot exam at least annually or more often if you have foot problems. Report any cuts, sores, or bruises to your health care provider immediately.  Contact a health care provider if:  · You have an injury that is not healing.  · You have cuts or breaks in the skin.  · You have an ingrown nail.  · You notice redness on your legs or feet.  · You feel burning or tingling in your legs or feet.  · You have pain or cramps in your legs and feet.  · Your legs or feet are numb.  · Your feet always feel cold.  Get help right away if:  · There is increasing redness, swelling, or pain in or around a wound.  · There is a red line that goes up your leg.  · Pus is coming from a wound.  · You develop a fever or as directed by your health care provider.  · You notice a bad smell coming from an ulcer or wound.  This information is not intended to replace advice given to you by your health care provider. Make sure you discuss any questions you have with your health care provider.  Document Released: 12/15/2001 Document Revised: 05/25/2017 Document Reviewed: 05/27/2014  [employer] Interactive Patient Education © 2017 [employer]  Ciclopirox nail solution  What is this medicine?  CICLOPIROX (sye kloe PEER ox) NAIL SOLUTION is an antifungal medicine. It used to treat fungal infections of the nails.  This medicine may be used for other  purposes; ask your health care provider or pharmacist if you have questions.  COMMON BRAND NAME(S): CNL8, Penlac  What should I tell my health care provider before I take this medicine?  They need to know if you have any of these conditions:  -diabetes mellitus  -history of seizures  -HIV infection  -immune system problems or organ transplant  -large areas of burned or damaged skin  -peripheral vascular disease or poor circulation  -taking corticosteroid medication (including steroid inhalers, cream, or lotion)  -an unusual or allergic reaction to ciclopirox, isopropyl alcohol, other medicines, foods, dyes, or preservatives  -pregnant or trying to get pregnant  -breast-feeding  How should I use this medicine?  This medicine is for external use only. Follow the directions that come with this medicine exactly. Wash and dry your hands before use. Avoid contact with the eyes, mouth or nose. If you do get this medicine in your eyes, rinse out with plenty of cool tap water. Contact your doctor or health care professional if eye irritation occurs. Use at regular intervals. Do not use your medicine more often than directed. Finish the full course prescribed by your doctor or health care professional even if you think you are better. Do not stop using except on your doctor's advice.  Talk to your pediatrician regarding the use of this medicine in children. While this medicine may be prescribed for children as young as 12 years for selected conditions, precautions do apply.  Overdosage: If you think you have taken too much of this medicine contact a poison control center or emergency room at once.  NOTE: This medicine is only for you. Do not share this medicine with others.  What if I miss a dose?  If you miss a dose, use it as soon as you can. If it is almost time for your next dose, use only that dose. Do not use double or extra doses.  What may interact with this medicine?  Interactions are not expected. Do not use any  other skin products without telling your doctor or health care professional.  This list may not describe all possible interactions. Give your health care provider a list of all the medicines, herbs, non-prescription drugs, or dietary supplements you use. Also tell them if you smoke, drink alcohol, or use illegal drugs. Some items may interact with your medicine.  What should I watch for while using this medicine?  Tell your doctor or health care professional if your symptoms get worse. Four to six months of treatment may be needed for the nail(s) to improve. Some people may not achieve a complete cure or clearing of the nails by this time.  Tell your doctor or health care professional if you develop sores or blisters that do not heal properly. If your nail infection returns after stopping using this product, contact your doctor or health care professional.  What side effects may I notice from receiving this medicine?  Side effects that you should report to your doctor or health care professional as soon as possible:  -allergic reactions like skin rash, itching or hives, swelling of the face, lips, or tongue  -severe irritation, redness, burning, blistering, peeling, swelling, oozing  Side effects that usually do not require medical attention (report to your doctor or health care professional if they continue or are bothersome):  -mild reddening of the skin  -nail discoloration  -temporary burning or mild stinging at the site of application  This list may not describe all possible side effects. Call your doctor for medical advice about side effects. You may report side effects to FDA at 9-419-FDA-3282.  Where should I keep my medicine?  Keep out of the reach of children.  Store at room temperature between 15 and 30 degrees C (59 and 86 degrees F). Do not freeze. Protect from light by storing the bottle in the carton after every use. This medicine is flammable. Keep away from heat and flame. Throw away any unused  medicine after the expiration date.  NOTE: This sheet is a summary. It may not cover all possible information. If you have questions about this medicine, talk to your doctor, pharmacist, or health care provider.  © 2018 [employer]/Gold Standard (2009-03-23 16:49:20)

## 2019-01-08 ENCOUNTER — OFFICE VISIT (OUTPATIENT)
Dept: OBGYN | Age: 84
End: 2019-01-08
Payer: MEDICARE

## 2019-01-08 VITALS
SYSTOLIC BLOOD PRESSURE: 134 MMHG | WEIGHT: 146 LBS | HEART RATE: 65 BPM | DIASTOLIC BLOOD PRESSURE: 71 MMHG | HEIGHT: 68 IN | BODY MASS INDEX: 22.13 KG/M2

## 2019-01-08 DIAGNOSIS — N90.89 VULVAR LESION: Primary | ICD-10-CM

## 2019-01-08 DIAGNOSIS — N95.2 ATROPHIC VAGINITIS: ICD-10-CM

## 2019-01-08 PROCEDURE — 99999 PR OFFICE/OUTPT VISIT,PROCEDURE ONLY: CPT | Performed by: NURSE PRACTITIONER

## 2019-01-08 PROCEDURE — 56605 BIOPSY OF VULVA/PERINEUM: CPT | Performed by: NURSE PRACTITIONER

## 2019-01-10 ENCOUNTER — TELEPHONE (OUTPATIENT)
Dept: OBGYN | Age: 84
End: 2019-01-10

## 2019-02-21 ENCOUNTER — HOSPITAL ENCOUNTER (OUTPATIENT)
Dept: GENERAL RADIOLOGY | Age: 84
Discharge: HOME | End: 2019-02-21
Payer: MEDICARE

## 2019-02-21 ENCOUNTER — HOSPITAL ENCOUNTER (OUTPATIENT)
Dept: SPEECH THERAPY | Age: 84
Setting detail: THERAPIES SERIES
Discharge: HOME | End: 2019-02-21
Payer: MEDICARE

## 2019-02-21 DIAGNOSIS — R13.10 DYSPHAGIA, UNSPECIFIED TYPE: ICD-10-CM

## 2019-02-21 PROCEDURE — 92611 MOTION FLUOROSCOPY/SWALLOW: CPT

## 2019-02-21 PROCEDURE — 74230 X-RAY XM SWLNG FUNCJ C+: CPT

## 2019-02-21 PROCEDURE — 92526 ORAL FUNCTION THERAPY: CPT

## 2019-06-15 ENCOUNTER — OFFICE VISIT (OUTPATIENT)
Dept: URGENT CARE | Age: 84
End: 2019-06-15
Payer: MEDICARE

## 2019-06-15 DIAGNOSIS — H10.31 ACUTE CONJUNCTIVITIS OF RIGHT EYE, UNSPECIFIED ACUTE CONJUNCTIVITIS TYPE: Primary | ICD-10-CM

## 2019-06-15 DIAGNOSIS — S09.90XA INJURY OF HEAD, INITIAL ENCOUNTER: ICD-10-CM

## 2019-06-15 PROCEDURE — G8420 CALC BMI NORM PARAMETERS: HCPCS | Performed by: FAMILY MEDICINE

## 2019-06-15 PROCEDURE — G8427 DOCREV CUR MEDS BY ELIG CLIN: HCPCS | Performed by: FAMILY MEDICINE

## 2019-06-15 PROCEDURE — 1090F PRES/ABSN URINE INCON ASSESS: CPT | Performed by: FAMILY MEDICINE

## 2019-06-15 PROCEDURE — 99213 OFFICE O/P EST LOW 20 MIN: CPT | Performed by: FAMILY MEDICINE

## 2019-06-15 PROCEDURE — 1123F ACP DISCUSS/DSCN MKR DOCD: CPT | Performed by: FAMILY MEDICINE

## 2019-06-15 PROCEDURE — 1036F TOBACCO NON-USER: CPT | Performed by: FAMILY MEDICINE

## 2019-06-15 PROCEDURE — 4040F PNEUMOC VAC/ADMIN/RCVD: CPT | Performed by: FAMILY MEDICINE

## 2019-06-15 RX ORDER — TOBRAMYCIN AND DEXAMETHASONE 3; 1 MG/ML; MG/ML
1 SUSPENSION/ DROPS OPHTHALMIC
Qty: 1 BOTTLE | Refills: 0 | Status: SHIPPED | OUTPATIENT
Start: 2019-06-15 | End: 2019-06-25

## 2019-06-15 ASSESSMENT — ENCOUNTER SYMPTOMS
EYE ITCHING: 1
EYE DISCHARGE: 1
EYE REDNESS: 1
BLURRED VISION: 0
DOUBLE VISION: 0
PHOTOPHOBIA: 0
FOREIGN BODY SENSATION: 0

## 2019-06-15 NOTE — PROGRESS NOTES
[address] [facility] CARE  [facility]  [address] [place] [id number]  Dept: [phone]  Loc: [phone]     Lacey Welch is a 80 y.o. female who presents today for her medical conditions/complaintsas noted below. Lacey Welch is c/o of Eye Problem (fell last Monday, hit the back of her head. Noticed right eye was red yesterday)        HPI:     Eye Problem    The right eye is affected. The current episode started yesterday. There was no injury mechanism. The patient is experiencing no pain. Associated symptoms include an eye discharge (a small amount), eye redness and itching. Pertinent negatives include no blurred vision, double vision, fever, foreign body sensation, photophobia or weakness.        Past Medical History:   Diagnosis Date    Cystic disease of breast     Heart murmur     Dr. Leidy Bunn Hypertension     Status post placement of implantable loop recorder 08/22/2017    Type II or unspecified type diabetes mellitus without mention of complication, not stated as uncontrolled     borderline     Past Surgical History:   Procedure Laterality Date    APPENDECTOMY      BREAST SURGERY      biopsy x3 benign    HERNIA REPAIR      HYSTERECTOMY, TOTAL ABDOMINAL      Dr. Faye Badillo         Family History   Problem Relation Age of Onset    Heart Disease Father        Social History     Tobacco Use    Smoking status: Never Smoker    Smokeless tobacco: Never Used   Substance Use Topics    Alcohol use: Yes     Comment: small glass wine daily      Current Outpatient Medications   Medication Sig Dispense Refill    tobramycin-dexamethasone (TOBRADEX) 0.3-0.1 % ophthalmic suspension Place 1 drop into the right eye every 4 hours (while awake) for 10 days 1 Bottle 0    metoprolol succinate (TOPROL XL) 25 MG extended release tablet TAKE 1 TABLET TWICE A DAY (Patient taking differently: TAKE 1 TABLET DAILY) 180 tablet 3    donepezil (ARICEPT) 5 MG tablet Take 5 mg by mouth nightly      Alpha Lipoic Acid 200 MG CAPS Take by mouth      lisinopril (PRINIVIL;ZESTRIL) 20 MG tablet Take 20 mg by mouth daily       pregabalin (LYRICA) 150 MG capsule Take 75 mg by mouth 3 times daily. Aiyana Wren amLODIPine (NORVASC) 5 MG tablet Take 1 tablet by mouth daily Pt takes at noon 30 tablet 5    Potassium 99 MG TABS Take 99 mg by mouth daily      Biotin 1000 MCG TABS Take 5,000 mcg by mouth daily      Probiotic Product (PROBIOTIC DAILY PO) Take 1 tablet by mouth daily      zolpidem (AMBIEN) 5 MG tablet Take 5 mg by mouth as needed for Sleep      metformin (GLUCOPHAGE-XR) 500 MG XR tablet Take 500 mg by mouth 3 times daily       vitamin B-12 (CYANOCOBALAMIN) 100 MCG tablet Take 100 mcg by mouth daily       Estradiol (YUVAFEM) 10 MCG TABS vaginal tablet INSERT 1 TABLET VAGINALLY twice a week 8 tablet 5    DULoxetine (CYMBALTA) 30 MG extended release capsule Take 30 mg by mouth daily      Calcium Carbonate Antacid (TUMS PO) Take 400 mg by mouth daily PT TAKES 2 400 MG TABLETS DAILY      GLUCOSAMINE-CHONDROITIN PO Take 1,000 mg by mouth daily      Cholecalciferol (VITAMIN D3) 1000 units TABS Take 1 tablet by mouth daily      cloNIDine (CATAPRES) 0.2 MG tablet Take 0.2 mg by mouth nightly        No current facility-administered medications for this visit. Allergies   Allergen Reactions    Penicillins Nausea And Vomiting    Quinine Derivatives Nausea And Vomiting    Tizanidine      Weakness, fever    Sulfa Antibiotics Rash       Health Maintenance   Topic Date Due    DTaP/Tdap/Td vaccine (1 - Tdap) 11/27/1948    Shingles Vaccine (1 of 2) 11/27/1979    Pneumococcal 65+ years Vaccine (1 of 2 - PCV13) 11/27/1994    Potassium monitoring  08/22/2018    Creatinine monitoring  08/22/2018    Flu vaccine (Season Ended) 09/01/2019       Subjective:     Review of Systems   Constitutional: Negative. Negative for fever.    Eyes: Positive for discharge (a small amount), redness and itching. Negative for blurred vision, double vision, photophobia and visual disturbance. Musculoskeletal: Negative. Negative for neck pain. Neurological: Negative for dizziness, speech difficulty, weakness, light-headedness, numbness and headaches. Objective:      Physical Exam   Constitutional: She is oriented to person, place, and time. She appears well-developed and well-nourished. No distress. HENT:   Head: Normocephalic. Very minimal tender area on the occiput. No deformity and there is no tear of the skin   Eyes: Pupils are equal, round, and reactive to light. EOM are normal. Right eye exhibits discharge (eye is red). Left eye exhibits no discharge. Neck: Normal range of motion. Neck supple. Neurological: She is alert and oriented to person, place, and time. She displays normal reflexes. No cranial nerve deficit or sensory deficit. She exhibits normal muscle tone. Coordination normal.   Skin: She is not diaphoretic. Nursing note and vitals reviewed. There were no vitals taken for this visit. Assessment:          Diagnosis Orders   1. Acute conjunctivitis of right eye, unspecified acute conjunctivitis type  tobramycin-dexamethasone (TOBRADEX) 0.3-0.1 % ophthalmic suspension   2. Injury of head, initial encounter         Plan:    No orders of the defined types were placed in this encounter. No follow-ups on file. No orders of the defined types were placed in this encounter. Orders Placed This Encounter   Medications    tobramycin-dexamethasone (TOBRADEX) 0.3-0.1 % ophthalmic suspension     Sig: Place 1 drop into the right eye every 4 hours (while awake) for 10 days     Dispense:  1 Bottle     Refill:  0       Patient given educationalmaterials - see patient instructions. Discussed use, benefit, and side effectsof prescribed medications. All patient questions answered. Pt voiced understanding. Reviewed health maintenance.   Instructed to continue current medications, diet andexercise. Patient agreed with treatment plan. Follow up as directed. Patient Instructions   Use the drops for 5-7 days, as directed. Return for problems. Remember, if you develop a headache or dizziness over the next few days to weeks, then return here or see Dr Roberts Links.         Electronically signed by Toya Coronado MD on 6/15/2019 at 9:28 AM

## 2019-06-15 NOTE — PATIENT INSTRUCTIONS
Use the drops for 5-7 days, as directed. Return for problems. Remember, if you develop a headache or dizziness over the next few days to weeks, then return here or see Dr Armando Parks.

## 2019-08-20 ENCOUNTER — OFFICE VISIT (OUTPATIENT)
Dept: OBGYN | Age: 84
End: 2019-08-20
Payer: MEDICARE

## 2019-08-20 VITALS
BODY MASS INDEX: 22.43 KG/M2 | HEIGHT: 68 IN | DIASTOLIC BLOOD PRESSURE: 70 MMHG | HEART RATE: 79 BPM | SYSTOLIC BLOOD PRESSURE: 113 MMHG | WEIGHT: 148 LBS

## 2019-08-20 DIAGNOSIS — N90.89 VULVAR LESION: ICD-10-CM

## 2019-08-20 DIAGNOSIS — N81.11 CYSTOCELE, MIDLINE: ICD-10-CM

## 2019-08-20 DIAGNOSIS — N95.2 ATROPHIC VAGINITIS: Primary | ICD-10-CM

## 2019-08-20 PROCEDURE — G8420 CALC BMI NORM PARAMETERS: HCPCS | Performed by: NURSE PRACTITIONER

## 2019-08-20 PROCEDURE — 1123F ACP DISCUSS/DSCN MKR DOCD: CPT | Performed by: NURSE PRACTITIONER

## 2019-08-20 PROCEDURE — G8427 DOCREV CUR MEDS BY ELIG CLIN: HCPCS | Performed by: NURSE PRACTITIONER

## 2019-08-20 PROCEDURE — 4040F PNEUMOC VAC/ADMIN/RCVD: CPT | Performed by: NURSE PRACTITIONER

## 2019-08-20 PROCEDURE — 1090F PRES/ABSN URINE INCON ASSESS: CPT | Performed by: NURSE PRACTITIONER

## 2019-08-20 PROCEDURE — 1036F TOBACCO NON-USER: CPT | Performed by: NURSE PRACTITIONER

## 2019-08-20 PROCEDURE — 99213 OFFICE O/P EST LOW 20 MIN: CPT | Performed by: NURSE PRACTITIONER

## 2019-08-20 RX ORDER — CLOBETASOL PROPIONATE 0.5 MG/G
OINTMENT TOPICAL
Qty: 1 TUBE | Refills: 0 | Status: SHIPPED | OUTPATIENT
Start: 2019-08-20

## 2019-08-20 RX ORDER — ESTRADIOL 10 UG/1
INSERT VAGINAL
Qty: 8 TABLET | Refills: 5 | Status: SHIPPED | OUTPATIENT
Start: 2019-08-20 | End: 2019-11-19 | Stop reason: SDUPTHER

## 2019-08-20 ASSESSMENT — ENCOUNTER SYMPTOMS
CONSTIPATION: 0
ALLERGIC/IMMUNOLOGIC NEGATIVE: 1
GASTROINTESTINAL NEGATIVE: 1
RESPIRATORY NEGATIVE: 1
DIARRHEA: 0

## 2019-08-20 NOTE — PROGRESS NOTES
Aroldo Holder is a 80 y.o. female who presents today for her medical conditions/ complaints as noted below. Aroldo Holder is c/o of Follow-up (vulvar lesion)        HPI  Pt presents for f/u on vaginal atrophy and lichens. Using Vagifem with good relief of symptoms. Using Premarin vaginal cream on vulvar lesion- benign biopsy, with \"ok\" relief of symptoms. Still doing yoga and silver sneakers and feeling well. No LMP recorded. Patient has had a hysterectomy.     Past Medical History:   Diagnosis Date    Cystic disease of breast     Heart murmur     Dr. Kalie Henning Hypertension     Status post placement of implantable loop recorder 2017    Type II or unspecified type diabetes mellitus without mention of complication, not stated as uncontrolled     borderline     Past Surgical History:   Procedure Laterality Date    APPENDECTOMY      BREAST SURGERY      biopsy x3 benign    HERNIA REPAIR      HYSTERECTOMY, TOTAL ABDOMINAL      Dr. Patricia Gomes       Family History   Problem Relation Age of Onset    Heart Disease Father      Social History     Tobacco Use    Smoking status: Never Smoker    Smokeless tobacco: Never Used   Substance Use Topics    Alcohol use: Yes     Comment: small glass wine daily       Current Outpatient Medications   Medication Sig Dispense Refill    Estradiol (YUVAFEM) 10 MCG TABS vaginal tablet INSERT 1 TABLET VAGINALLY twice a week 8 tablet 5    metoprolol succinate (TOPROL XL) 25 MG extended release tablet TAKE 1 TABLET TWICE A DAY (Patient taking differently: TAKE 1 TABLET DAILY) 180 tablet 3    donepezil (ARICEPT) 5 MG tablet Take 5 mg by mouth nightly      Alpha Lipoic Acid 200 MG CAPS Take by mouth      lisinopril (PRINIVIL;ZESTRIL) 20 MG tablet Take 20 mg by mouth daily       pregabalin (LYRICA) 150 MG capsule Take 75 mg by mouth 3 times daily.  Inbrandie Patel DULoxetine (CYMBALTA) 30 MG extended release capsule Take 30 mg by mouth daily     

## 2019-11-19 ENCOUNTER — OFFICE VISIT (OUTPATIENT)
Dept: OBGYN | Age: 84
End: 2019-11-19
Payer: MEDICARE

## 2019-11-19 VITALS
SYSTOLIC BLOOD PRESSURE: 130 MMHG | DIASTOLIC BLOOD PRESSURE: 85 MMHG | HEART RATE: 86 BPM | WEIGHT: 150 LBS | BODY MASS INDEX: 22.81 KG/M2

## 2019-11-19 DIAGNOSIS — Z01.419 ENCOUNTER FOR ROUTINE GYNECOLOGIC EXAMINATION IN MEDICARE PATIENT: Primary | ICD-10-CM

## 2019-11-19 DIAGNOSIS — N32.81 OAB (OVERACTIVE BLADDER): ICD-10-CM

## 2019-11-19 DIAGNOSIS — L90.0 LICHEN SCLEROSUS ET ATROPHICUS: ICD-10-CM

## 2019-11-19 DIAGNOSIS — N81.11 CYSTOCELE, MIDLINE: ICD-10-CM

## 2019-11-19 PROCEDURE — 1090F PRES/ABSN URINE INCON ASSESS: CPT | Performed by: NURSE PRACTITIONER

## 2019-11-19 PROCEDURE — G8428 CUR MEDS NOT DOCUMENT: HCPCS | Performed by: NURSE PRACTITIONER

## 2019-11-19 PROCEDURE — 4040F PNEUMOC VAC/ADMIN/RCVD: CPT | Performed by: NURSE PRACTITIONER

## 2019-11-19 PROCEDURE — 1123F ACP DISCUSS/DSCN MKR DOCD: CPT | Performed by: NURSE PRACTITIONER

## 2019-11-19 PROCEDURE — G8484 FLU IMMUNIZE NO ADMIN: HCPCS | Performed by: NURSE PRACTITIONER

## 2019-11-19 PROCEDURE — G8420 CALC BMI NORM PARAMETERS: HCPCS | Performed by: NURSE PRACTITIONER

## 2019-11-19 PROCEDURE — 1036F TOBACCO NON-USER: CPT | Performed by: NURSE PRACTITIONER

## 2019-11-19 PROCEDURE — 99213 OFFICE O/P EST LOW 20 MIN: CPT | Performed by: NURSE PRACTITIONER

## 2019-11-19 RX ORDER — LISINOPRIL 20 MG/1
10 TABLET ORAL
COMMUNITY
End: 2019-11-19

## 2019-11-19 RX ORDER — ESTRADIOL 10 UG/1
INSERT VAGINAL
Qty: 8 TABLET | Refills: 11 | Status: SHIPPED | OUTPATIENT
Start: 2019-11-19 | End: 2020-04-17 | Stop reason: SDUPTHER

## 2019-11-19 RX ORDER — AMLODIPINE BESYLATE 2.5 MG/1
2.5 TABLET ORAL
COMMUNITY
Start: 2019-10-21

## 2019-11-19 RX ORDER — PREGABALIN 75 MG/1
75 CAPSULE ORAL
COMMUNITY
Start: 2019-10-21 | End: 2019-11-19

## 2019-11-19 RX ORDER — DONEPEZIL HYDROCHLORIDE 10 MG/1
10 TABLET, FILM COATED ORAL
COMMUNITY
End: 2019-11-19

## 2019-11-19 RX ORDER — METFORMIN HYDROCHLORIDE 500 MG/1
500 TABLET, EXTENDED RELEASE ORAL
COMMUNITY
End: 2019-11-19

## 2019-11-19 RX ORDER — METOPROLOL SUCCINATE 25 MG/1
25 TABLET, EXTENDED RELEASE ORAL
COMMUNITY
End: 2019-11-19

## 2019-11-19 ASSESSMENT — ENCOUNTER SYMPTOMS
GASTROINTESTINAL NEGATIVE: 1
DIARRHEA: 0
ALLERGIC/IMMUNOLOGIC NEGATIVE: 1
CONSTIPATION: 0
RESPIRATORY NEGATIVE: 1

## 2020-02-13 ENCOUNTER — APPOINTMENT (OUTPATIENT)
Dept: CT IMAGING | Facility: HOSPITAL | Age: 85
End: 2020-02-13

## 2020-02-13 ENCOUNTER — HOSPITAL ENCOUNTER (EMERGENCY)
Facility: HOSPITAL | Age: 85
Discharge: HOME | End: 2020-02-13
Attending: EMERGENCY MEDICINE | Admitting: EMERGENCY MEDICINE

## 2020-02-13 VITALS
BODY MASS INDEX: 23.21 KG/M2 | HEIGHT: 70 IN | RESPIRATION RATE: 18 BRPM | SYSTOLIC BLOOD PRESSURE: 127 MMHG | OXYGEN SATURATION: 100 % | WEIGHT: 162.1 LBS | DIASTOLIC BLOOD PRESSURE: 61 MMHG | TEMPERATURE: 97.2 F | HEART RATE: 97 BPM

## 2020-02-13 DIAGNOSIS — R10.9 ABDOMINAL PAIN, UNSPECIFIED ABDOMINAL LOCATION: Primary | ICD-10-CM

## 2020-02-13 DIAGNOSIS — R55 NEAR SYNCOPE: ICD-10-CM

## 2020-02-13 LAB
ALBUMIN SERPL-MCNC: 4.1 G/DL (ref 3.5–5.2)
ALBUMIN/GLOB SERPL: 1.6 G/DL
ALP SERPL-CCNC: 83 U/L (ref 39–117)
ALT SERPL W P-5'-P-CCNC: 22 U/L (ref 1–33)
ANION GAP SERPL CALCULATED.3IONS-SCNC: 12 MMOL/L (ref 5–15)
AST SERPL-CCNC: 26 U/L (ref 1–32)
BASOPHILS # BLD AUTO: 0.05 10*3/MM3 (ref 0–0.2)
BASOPHILS NFR BLD AUTO: 0.7 % (ref 0–1.5)
BILIRUB SERPL-MCNC: 0.4 MG/DL (ref 0.2–1.2)
BILIRUB UR QL STRIP: NEGATIVE
BUN BLD-MCNC: 17 MG/DL (ref 8–23)
BUN/CREAT SERPL: 23.3 (ref 7–25)
CALCIUM SPEC-SCNC: 9 MG/DL (ref 8.2–9.6)
CHLORIDE SERPL-SCNC: 98 MMOL/L (ref 98–107)
CLARITY UR: CLEAR
CO2 SERPL-SCNC: 25 MMOL/L (ref 22–29)
COLOR UR: YELLOW
CREAT BLD-MCNC: 0.73 MG/DL (ref 0.57–1)
DEPRECATED RDW RBC AUTO: 43.8 FL (ref 37–54)
EOSINOPHIL # BLD AUTO: 0.25 10*3/MM3 (ref 0–0.4)
EOSINOPHIL NFR BLD AUTO: 3.4 % (ref 0.3–6.2)
ERYTHROCYTE [DISTWIDTH] IN BLOOD BY AUTOMATED COUNT: 14.2 % (ref 12.3–15.4)
GFR SERPL CREATININE-BSD FRML MDRD: 75 ML/MIN/1.73
GLOBULIN UR ELPH-MCNC: 2.6 GM/DL
GLUCOSE BLD-MCNC: 125 MG/DL (ref 65–99)
GLUCOSE UR STRIP-MCNC: NEGATIVE MG/DL
HCT VFR BLD AUTO: 34.7 % (ref 34–46.6)
HGB BLD-MCNC: 11.8 G/DL (ref 12–15.9)
HGB UR QL STRIP.AUTO: NEGATIVE
HOLD SPECIMEN: NORMAL
HOLD SPECIMEN: NORMAL
IMM GRANULOCYTES # BLD AUTO: 0.01 10*3/MM3 (ref 0–0.05)
IMM GRANULOCYTES NFR BLD AUTO: 0.1 % (ref 0–0.5)
KETONES UR QL STRIP: NEGATIVE
LEUKOCYTE ESTERASE UR QL STRIP.AUTO: NEGATIVE
LIPASE SERPL-CCNC: 55 U/L (ref 13–60)
LYMPHOCYTES # BLD AUTO: 1.91 10*3/MM3 (ref 0.7–3.1)
LYMPHOCYTES NFR BLD AUTO: 26.3 % (ref 19.6–45.3)
MCH RBC QN AUTO: 28.6 PG (ref 26.6–33)
MCHC RBC AUTO-ENTMCNC: 34 G/DL (ref 31.5–35.7)
MCV RBC AUTO: 84.2 FL (ref 79–97)
MONOCYTES # BLD AUTO: 0.58 10*3/MM3 (ref 0.1–0.9)
MONOCYTES NFR BLD AUTO: 8 % (ref 5–12)
NEUTROPHILS # BLD AUTO: 4.46 10*3/MM3 (ref 1.7–7)
NEUTROPHILS NFR BLD AUTO: 61.5 % (ref 42.7–76)
NITRITE UR QL STRIP: NEGATIVE
NRBC BLD AUTO-RTO: 0 /100 WBC (ref 0–0.2)
PH UR STRIP.AUTO: 8.5 [PH] (ref 5–8)
PLATELET # BLD AUTO: 328 10*3/MM3 (ref 140–450)
PMV BLD AUTO: 9.7 FL (ref 6–12)
POTASSIUM BLD-SCNC: 4.5 MMOL/L (ref 3.5–5.2)
PROT SERPL-MCNC: 6.7 G/DL (ref 6–8.5)
PROT UR QL STRIP: NEGATIVE
RBC # BLD AUTO: 4.12 10*6/MM3 (ref 3.77–5.28)
SODIUM BLD-SCNC: 135 MMOL/L (ref 136–145)
SP GR UR STRIP: 1.02 (ref 1–1.03)
UROBILINOGEN UR QL STRIP: ABNORMAL
WBC NRBC COR # BLD: 7.26 10*3/MM3 (ref 3.4–10.8)
WHOLE BLOOD HOLD SPECIMEN: NORMAL
WHOLE BLOOD HOLD SPECIMEN: NORMAL

## 2020-02-13 PROCEDURE — 85025 COMPLETE CBC W/AUTO DIFF WBC: CPT | Performed by: EMERGENCY MEDICINE

## 2020-02-13 PROCEDURE — 96374 THER/PROPH/DIAG INJ IV PUSH: CPT

## 2020-02-13 PROCEDURE — 25010000002 MORPHINE PER 10 MG: Performed by: EMERGENCY MEDICINE

## 2020-02-13 PROCEDURE — 80053 COMPREHEN METABOLIC PANEL: CPT | Performed by: EMERGENCY MEDICINE

## 2020-02-13 PROCEDURE — 81003 URINALYSIS AUTO W/O SCOPE: CPT | Performed by: EMERGENCY MEDICINE

## 2020-02-13 PROCEDURE — 93005 ELECTROCARDIOGRAM TRACING: CPT | Performed by: EMERGENCY MEDICINE

## 2020-02-13 PROCEDURE — 83690 ASSAY OF LIPASE: CPT | Performed by: EMERGENCY MEDICINE

## 2020-02-13 PROCEDURE — P9612 CATHETERIZE FOR URINE SPEC: HCPCS

## 2020-02-13 PROCEDURE — 25010000002 ONDANSETRON PER 1 MG: Performed by: EMERGENCY MEDICINE

## 2020-02-13 PROCEDURE — 96375 TX/PRO/DX INJ NEW DRUG ADDON: CPT

## 2020-02-13 PROCEDURE — 99284 EMERGENCY DEPT VISIT MOD MDM: CPT

## 2020-02-13 PROCEDURE — 93010 ELECTROCARDIOGRAM REPORT: CPT | Performed by: INTERNAL MEDICINE

## 2020-02-13 PROCEDURE — 25010000002 IOPAMIDOL 61 % SOLUTION: Performed by: EMERGENCY MEDICINE

## 2020-02-13 PROCEDURE — 74177 CT ABD & PELVIS W/CONTRAST: CPT

## 2020-02-13 RX ORDER — POTASSIUM CHLORIDE 750 MG/1
10 TABLET, FILM COATED, EXTENDED RELEASE ORAL 2 TIMES DAILY
Status: ON HOLD | COMMUNITY
End: 2020-04-02

## 2020-02-13 RX ORDER — SODIUM CHLORIDE 0.9 % (FLUSH) 0.9 %
10 SYRINGE (ML) INJECTION AS NEEDED
Status: DISCONTINUED | OUTPATIENT
Start: 2020-02-13 | End: 2020-02-13 | Stop reason: HOSPADM

## 2020-02-13 RX ORDER — ONDANSETRON 2 MG/ML
4 INJECTION INTRAMUSCULAR; INTRAVENOUS ONCE
Status: COMPLETED | OUTPATIENT
Start: 2020-02-13 | End: 2020-02-13

## 2020-02-13 RX ADMIN — ONDANSETRON HYDROCHLORIDE 4 MG: 2 SOLUTION INTRAMUSCULAR; INTRAVENOUS at 11:58

## 2020-02-13 RX ADMIN — MORPHINE SULFATE 4 MG: 4 INJECTION, SOLUTION INTRAMUSCULAR; INTRAVENOUS at 11:58

## 2020-02-13 RX ADMIN — IOPAMIDOL 100 ML: 612 INJECTION, SOLUTION INTRAVENOUS at 12:35

## 2020-02-13 NOTE — ED PROVIDER NOTES
Subjective   She presents with a complaint of a sudden onset of abdominal pain in the upper abdomen mostly in the mid upper abdomen and to the left side.  She says is all hit her about an hour ago.  After the pain hit her she did have an episode where she passed out or almost passed out.  Her family with her says that she passed out but she says she did not pass out completely but did get very weak and her eyes rolled back.  She says she is not hurting in her chest anywhere.  She is never had any pain like this before.  She is worried that she has an obstruction in her valve but is never had that before.  She does states she has had diarrhea and not a good solid stool for the past several weeks.  She had some nausea today but no actual vomiting.  She is still hurting now.  Denies any blood or dark color to the diarrhea.      History provided by:  Patient and relative  History limited by:  Age   used: No    Abdominal Pain   Pain location:  Epigastric and LUQ  Pain quality: aching    Pain radiates to:  Does not radiate  Pain severity:  Severe  Onset quality:  Sudden  Duration:  1 hour  Timing:  Constant  Progression:  Unchanged  Chronicity:  New  Context: previous surgery    Context: not alcohol use, not awakening from sleep, not diet changes, not eating, not laxative use, not medication withdrawal, not recent illness, not recent sexual activity, not recent travel, not retching, not sick contacts, not suspicious food intake and not trauma    Relieved by:  Nothing  Worsened by:  Nothing  Ineffective treatments:  None tried  Associated symptoms: diarrhea and nausea    Associated symptoms: no anorexia, no belching, no chest pain, no chills, no constipation, no cough, no dysuria, no fatigue, no fever, no flatus, no hematemesis, no hematochezia, no hematuria, no melena, no shortness of breath, no sore throat, no vaginal bleeding, no vaginal discharge and no vomiting    Risk factors: being elderly and  multiple surgeries    Risk factors: no alcohol abuse, no aspirin use, no NSAID use, not obese, not pregnant and no recent hospitalization        Review of Systems   Constitutional: Negative.  Negative for chills, fatigue and fever.   HENT: Negative.  Negative for sore throat.    Respiratory: Negative.  Negative for cough and shortness of breath.    Cardiovascular: Negative.  Negative for chest pain.   Gastrointestinal: Positive for abdominal pain, diarrhea and nausea. Negative for anorexia, constipation, flatus, hematemesis, hematochezia, melena and vomiting.   Genitourinary: Negative.  Negative for dysuria, hematuria, vaginal bleeding and vaginal discharge.   Musculoskeletal: Negative.    Skin: Negative.    Neurological: Negative.    Psychiatric/Behavioral: Negative.    All other systems reviewed and are negative.      Past Medical History:   Diagnosis Date   • Diabetes mellitus (CMS/HCC)    • Disc degeneration, lumbar    • History of adenomatous polyp of colon    • Hypercholesterolemia    • Irregular heartbeat    • LAFB (left anterior fascicular block) 10/26/2018   • MVP (mitral valve prolapse)    • Osteoarthritis    • Sciatica        Allergies   Allergen Reactions   • Penicillins Nausea And Vomiting   • Quinine Derivatives Nausea And Vomiting   • Tizanidine Other (See Comments)     Weakness, fever   • Sulfa Antibiotics Rash       Past Surgical History:   Procedure Laterality Date   • APPENDECTOMY     • BLADDER REPAIR     • COLONOSCOPY  02/23/2010    diverticulosis   • COLONOSCOPY W/ POLYPECTOMY  11/06/2006    ASCENDING COLON ADENOMATOUS POLYP   • ENDOSCOPY  10/04/2006    SBBX UNREMARKABLE, SMALL HH, UREA NEG   • HYSTERECTOMY     • TONSILLECTOMY         Family History   Problem Relation Age of Onset   • Colon polyps Son    • Stroke Mother    • Heart attack Father    • GI problems Neg Hx    • Colon cancer Neg Hx        Social History     Socioeconomic History   • Marital status:      Spouse name: Not on file    • Number of children: Not on file   • Years of education: Not on file   • Highest education level: Not on file   Tobacco Use   • Smoking status: Never Smoker   • Smokeless tobacco: Never Used   Substance and Sexual Activity   • Alcohol use: Yes     Comment: wine occasional   • Drug use: No       Prior to Admission medications    Medication Sig Start Date End Date Taking? Authorizing Provider   B Complex Vitamins (B COMPLEX 50 PO) Take 1 tablet by mouth Daily.   Yes Devyn Lara MD   Multiple Vitamins-Minerals (MULTIPLE VITAMINS/WOMENS PO) Take 1 tablet by mouth Daily.   Yes Devyn Lara MD   potassium chloride (K-DUR) 10 MEQ CR tablet Take 10 mEq by mouth 2 (Two) Times a Day.   Yes Devyn Lara MD   amLODIPine (NORVASC) 5 MG tablet Take 1 tablet by mouth Daily. 10/25/18   Mo Jasso MD   BIOTIN 5000 PO Take  by mouth.    Devyn Lara MD   CALCIUM CITRATE-VITAMIN D Take  by mouth daily.      Devyn Lara MD   Cholecalciferol (VITAMIN D3) 5000 units capsule capsule Take 5,000 Units by mouth Daily.    Devyn Lara MD   CloNIDine (CATAPRES) 0.2 MG tablet  8/16/15   Devyn Lara MD   donepezil (ARICEPT) 10 MG tablet Take 10 mg by mouth Every Night.    Devyn Lara MD   DULoxetine (CYMBALTA) 30 MG capsule Take 30 mg by mouth Daily.    Devyn Lara MD   ESTRADIOL, VAGINAL ESTROGENS, Place 25 mcg vaginally three times a week.      Devyn Lara MD   lisinopril (PRINIVIL,ZESTRIL) 20 MG tablet Take 20 mg by mouth daily.      Devyn Lara MD   metFORMIN ER (GLUCOPHAGE-XR) 500 MG 24 hr tablet Take 500 mg by mouth daily (with breakfast).      Devyn Lara MD   metoprolol succinate XL (TOPROL-XL) 25 MG 24 hr tablet Take 25 mg by mouth daily.      Devyn Lara MD   pregabalin (LYRICA) 150 MG capsule Take 75 mg by mouth 3 (Three) Times a Day. Or take 100 3 times daily     Devyn Lara MD   Zolpidem  Tartrate (AMBIEN PO) Take  by mouth. 5-10 mg at night    Provider, MD Devyn       Medications   sodium chloride 0.9 % flush 10 mL (has no administration in time range)   sodium chloride 0.9 % flush 10 mL (has no administration in time range)   morphine injection 4 mg (4 mg Intravenous Given 2/13/20 1158)   ondansetron (ZOFRAN) injection 4 mg (4 mg Intravenous Given 2/13/20 1158)   iopamidol (ISOVUE-300) 61 % injection 100 mL (100 mL Intravenous Given 2/13/20 1235)       Vitals:    02/13/20 1539   BP: 127/61   Pulse: 97   Resp: 18   Temp: 97.2 °F (36.2 °C)   SpO2: 100%         Objective   Physical Exam   Constitutional: She is oriented to person, place, and time. She appears well-developed and well-nourished.   HENT:   Head: Normocephalic and atraumatic.   Cardiovascular: Normal rate and regular rhythm.   Pulmonary/Chest: Breath sounds normal.   Abdominal: Soft. Normal appearance and bowel sounds are normal. There is tenderness in the epigastric area and left upper quadrant. There is guarding. There is no rigidity and no rebound.   Neurological: She is alert and oriented to person, place, and time.   Skin: Skin is warm and dry.   Psychiatric: She has a normal mood and affect. Her behavior is normal.   Nursing note and vitals reviewed.      Procedures         Lab Results (last 24 hours)     Procedure Component Value Units Date/Time    CBC & Differential [[id number]] Collected:  02/13/20 1118    Specimen:  Blood Updated:  02/13/20 1151    Narrative:       The following orders were created for panel order CBC & Differential.  Procedure                               Abnormality         Status                     ---------                               -----------         ------                     CBC Auto Differential[[id number]]        Abnormal            Final result                 Please view results for these tests on the individual orders.    Comprehensive Metabolic Panel [[id number]]  (Abnormal) Collected:   02/13/20 1118    Specimen:  Blood Updated:  02/13/20 1202     Glucose 125 mg/dL      BUN 17 mg/dL      Creatinine 0.73 mg/dL      Sodium 135 mmol/L      Potassium 4.5 mmol/L      Chloride 98 mmol/L      CO2 25.0 mmol/L      Calcium 9.0 mg/dL      Total Protein 6.7 g/dL      Albumin 4.10 g/dL      ALT (SGPT) 22 U/L      AST (SGOT) 26 U/L      Alkaline Phosphatase 83 U/L      Total Bilirubin 0.4 mg/dL      eGFR Non African Amer 75 mL/min/1.73      Globulin 2.6 gm/dL      A/G Ratio 1.6 g/dL      BUN/Creatinine Ratio 23.3     Anion Gap 12.0 mmol/L     Narrative:       GFR Normal >60  Chronic Kidney Disease <60  Kidney Failure <15      Lipase [[id number]]  (Normal) Collected:  02/13/20 1118    Specimen:  Blood Updated:  02/13/20 1202     Lipase 55 U/L     CBC Auto Differential [[id number]]  (Abnormal) Collected:  02/13/20 1118    Specimen:  Blood Updated:  02/13/20 1151     WBC 7.26 10*3/mm3      RBC 4.12 10*6/mm3      Hemoglobin 11.8 g/dL      Hematocrit 34.7 %      MCV 84.2 fL      MCH 28.6 pg      MCHC 34.0 g/dL      RDW 14.2 %      RDW-SD 43.8 fl      MPV 9.7 fL      Platelets 328 10*3/mm3      Neutrophil % 61.5 %      Lymphocyte % 26.3 %      Monocyte % 8.0 %      Eosinophil % 3.4 %      Basophil % 0.7 %      Immature Grans % 0.1 %      Neutrophils, Absolute 4.46 10*3/mm3      Lymphocytes, Absolute 1.91 10*3/mm3      Monocytes, Absolute 0.58 10*3/mm3      Eosinophils, Absolute 0.25 10*3/mm3      Basophils, Absolute 0.05 10*3/mm3      Immature Grans, Absolute 0.01 10*3/mm3      nRBC 0.0 /100 WBC     Urinalysis With Culture If Indicated - Urine, Catheter [[id number]]  (Abnormal) Collected:  02/13/20 1351    Specimen:  Urine, Catheter Updated:  02/13/20 1401     Color, UA Yellow     Appearance, UA Clear     pH, UA 8.5     Specific Gravity, UA 1.020     Glucose, UA Negative     Ketones, UA Negative     Bilirubin, UA Negative     Blood, UA Negative     Protein, UA Negative     Leuk Esterase, UA Negative     Nitrite, UA  Negative     Urobilinogen, UA 0.2 E.U./dL    Narrative:       Urine microscopic not indicated.          CT Abdomen Pelvis With Contrast   Final Result   1. Moderate distention of the gallbladder with a short axis diameter of   4.7 cm, no gallstones or definite cholecystitis is identified. There is   dilation of the extrahepatic bile ducts measuring up to 1.3 cm, at the   proximal common bile duct. Correlation with liver function tests is   recommended.   2. Fatty infiltration of the liver.   3. No abnormal fluid collections or evidence of free air.   4. Moderate sigmoid diverticulosis without evidence of acute   diverticulitis.   5. Evidence of previous hysterectomy.   6. Heavy atherosclerotic changes of the abdominal aorta and its   branches.   7. Advanced multilevel degenerative spondylosis of the lumbar spine.   This report was finalized on 02/13/2020 12:56 by Dr. Demetris Merida MD.          ED Course  ED Course as of Feb 13 1705   Thu Feb 13, 2020 1704 I told the patient and family the findings on CT and lab work here.  There is nothing acute on the CT of the abdomen that would explain her pain.  Her lab work is all fine.  She actually feels fine at discharge.  I first told her pain could be an ulcer or something that that we cannot see here.  Her gallbladder is little larger than average but there is no signs of cholecystitis.  She then tells me her pain was actually lower in her abdomen that I understood to be initially.  We then talked about the possibility of irritable bowel syndrome.  I did tell her that she probably had the passing out spell because of the pain but she is now tells me that the lightheaded feeling started before the pain hit her.  I do not have an answer for that and I do not have a good answer for the hypothermia though she is better now.  At any rate right now she looks okay.  She is discharged in stable condition with follow-up with her family physician for further evaluation as  indicated.    [TR]      ED Course User Index  [TR] Emeka Bonilla Jr., MD          MDM  Number of Diagnoses or Management Options  Abdominal pain, unspecified abdominal location: new and requires workup  Near syncope: new and requires workup     Amount and/or Complexity of Data Reviewed  Clinical lab tests: ordered and reviewed  Tests in the radiology section of CPT®: ordered and reviewed  Tests in the medicine section of CPT®: ordered and reviewed    Risk of Complications, Morbidity, and/or Mortality  Presenting problems: moderate  Diagnostic procedures: moderate  Management options: moderate    Patient Progress  Patient progress: stable      Final diagnoses:   Abdominal pain, unspecified abdominal location   Near syncope          Emeka Bonilla Jr., MD  02/13/20 8433

## 2020-03-05 ENCOUNTER — OFFICE VISIT (OUTPATIENT)
Dept: GASTROENTEROLOGY | Facility: CLINIC | Age: 85
End: 2020-03-05

## 2020-03-05 VITALS
SYSTOLIC BLOOD PRESSURE: 120 MMHG | HEIGHT: 68 IN | HEART RATE: 92 BPM | BODY MASS INDEX: 23.04 KG/M2 | WEIGHT: 152 LBS | DIASTOLIC BLOOD PRESSURE: 68 MMHG | OXYGEN SATURATION: 93 %

## 2020-03-05 DIAGNOSIS — Z78.9 NONSMOKER: ICD-10-CM

## 2020-03-05 DIAGNOSIS — K58.0 IRRITABLE BOWEL SYNDROME WITH DIARRHEA: Primary | ICD-10-CM

## 2020-03-05 DIAGNOSIS — R10.11 RIGHT UPPER QUADRANT ABDOMINAL PAIN: ICD-10-CM

## 2020-03-05 PROCEDURE — 99213 OFFICE O/P EST LOW 20 MIN: CPT | Performed by: CLINICAL NURSE SPECIALIST

## 2020-03-05 NOTE — PROGRESS NOTES
Hannah Dasilva  11/27/1929    3/5/2020  Chief Complaint   Patient presents with   • GI Problem     Abdominal pain     Subjective   HPI  Hannah Dasilva is a 89 y.o. female who presents with a complaint of abdominal pain to the RUQ rated a 10 out of 10. It was sharp and stabbing. They did a CT scan in the ER with contrast distended gallbladder no stones, fatty liver, diverticulosis. No certain triggers. No alleviating factors. She is having loose stools for a while which is new for her. We feel diet and medication are a contributing factor.  No further pain. No rectal bleeding. No fever chills or sweats. WBC was normla H/H 11.8/34.7. Liver functions normal.     Past Medical History:   Diagnosis Date   • Diabetes mellitus (CMS/HCC)    • Disc degeneration, lumbar    • History of adenomatous polyp of colon    • Hypercholesterolemia    • Irregular heartbeat    • LAFB (left anterior fascicular block) 10/26/2018   • MVP (mitral valve prolapse)    • Osteoarthritis    • Sciatica      Past Surgical History:   Procedure Laterality Date   • APPENDECTOMY     • BLADDER REPAIR     • COLONOSCOPY  02/23/2010    diverticulosis   • COLONOSCOPY W/ POLYPECTOMY  11/06/2006    ASCENDING COLON ADENOMATOUS POLYP   • ENDOSCOPY  10/04/2006    SBBX UNREMARKABLE, SMALL HH, UREA NEG   • HYSTERECTOMY     • TONSILLECTOMY         Outpatient Medications Marked as Taking for the 3/5/20 encounter (Office Visit) with Aga Hendrickson APRN   Medication Sig Dispense Refill   • amLODIPine (NORVASC) 5 MG tablet Take 1 tablet by mouth Daily. 90 tablet 3   • B Complex Vitamins (B COMPLEX 50 PO) Take 1 tablet by mouth Daily.     • BIOTIN 5000 PO Take  by mouth.     • CALCIUM CITRATE-VITAMIN D Take  by mouth daily.       • Cholecalciferol (VITAMIN D3) 5000 units capsule capsule Take 5,000 Units by mouth Daily.     • CloNIDine (CATAPRES) 0.2 MG tablet      • donepezil (ARICEPT) 10 MG tablet Take 10 mg by mouth Every Night.     • DULoxetine (CYMBALTA)  30 MG capsule Take 30 mg by mouth Daily.     • ESTRADIOL, VAGINAL ESTROGENS, Place 25 mcg vaginally three times a week.       • lisinopril (PRINIVIL,ZESTRIL) 20 MG tablet Take 20 mg by mouth daily.       • metFORMIN ER (GLUCOPHAGE-XR) 500 MG 24 hr tablet Take 500 mg by mouth daily (with breakfast).       • metoprolol succinate XL (TOPROL-XL) 25 MG 24 hr tablet Take 25 mg by mouth daily.       • Multiple Vitamins-Minerals (MULTIPLE VITAMINS/WOMENS PO) Take 1 tablet by mouth Daily.     • potassium chloride (K-DUR) 10 MEQ CR tablet Take 10 mEq by mouth 2 (Two) Times a Day.     • pregabalin (LYRICA) 150 MG capsule Take 75 mg by mouth 3 (Three) Times a Day. Or take 100 3 times daily      • Zolpidem Tartrate (AMBIEN PO) Take  by mouth. 5-10 mg at night       Allergies   Allergen Reactions   • Penicillins Nausea And Vomiting   • Quinine Derivatives Nausea And Vomiting   • Tizanidine Other (See Comments)     Weakness, fever   • Sulfa Antibiotics Rash     Social History     Socioeconomic History   • Marital status:      Spouse name: Not on file   • Number of children: Not on file   • Years of education: Not on file   • Highest education level: Not on file   Tobacco Use   • Smoking status: Never Smoker   • Smokeless tobacco: Never Used   Substance and Sexual Activity   • Alcohol use: Yes     Comment: wine occasional   • Drug use: No     Family History   Problem Relation Age of Onset   • Colon polyps Son    • Stroke Mother    • Heart attack Father    • GI problems Neg Hx    • Colon cancer Neg Hx      Health Maintenance   Topic Date Due   • URINE MICROALBUMIN  11/27/1929   • TDAP/TD VACCINES (1 - Tdap) [birth year]/27/1940   • ZOSTER VACCINE (1 of 2) 11/27/1979   • Pneumococcal Vaccine Once at 65 Years Old  11/27/1994   • LIPID PANEL  06/12/2017   • HEMOGLOBIN A1C  03/30/2020   • MEDICARE ANNUAL WELLNESS  05/09/2020   • DIABETIC EYE EXAM  09/30/2020   • INFLUENZA VACCINE  Completed     Review of Systems   Constitutional: Negative  "for activity change, appetite change, chills, diaphoresis, fatigue, fever and unexpected weight change.   HENT: Negative for ear pain, hearing loss, mouth sores, sore throat, trouble swallowing and voice change.    Eyes: Negative.    Respiratory: Negative for cough, choking, shortness of breath and wheezing.    Cardiovascular: Negative for chest pain and palpitations.   Gastrointestinal: Positive for abdominal pain and diarrhea. Negative for blood in stool, constipation, nausea and vomiting.   Endocrine: Negative for cold intolerance and heat intolerance.   Genitourinary: Negative for decreased urine volume, dysuria, frequency, hematuria and urgency.   Musculoskeletal: Negative for back pain, gait problem and myalgias.   Skin: Negative for color change, pallor and rash.   Allergic/Immunologic: Negative for food allergies and immunocompromised state.   Neurological: Negative for dizziness, tremors, seizures, syncope, weakness, light-headedness, numbness and headaches.   Hematological: Negative for adenopathy. Does not bruise/bleed easily.   Psychiatric/Behavioral: Negative for agitation and confusion. The patient is not nervous/anxious.    All other systems reviewed and are negative.    Objective   Vitals:    03/05/20 1300   BP: 120/68   Pulse: 92   SpO2: 93%   Weight: 68.9 kg (152 lb)   Height: 172.7 cm (68\")     Body mass index is 23.11 kg/m².  Physical Exam   Constitutional: She is oriented to person, place, and time. She appears well-developed and well-nourished.   HENT:   Head: Normocephalic and atraumatic.   Eyes: Pupils are equal, round, and reactive to light.   Neck: Normal range of motion. Neck supple. No tracheal deviation present.   Cardiovascular: Normal rate, regular rhythm and normal heart sounds. Exam reveals no gallop and no friction rub.   No murmur heard.  Pulmonary/Chest: Effort normal and breath sounds normal. No respiratory distress. She has no wheezes. She has no rales. She exhibits no " tenderness.   Abdominal: Soft. Bowel sounds are normal. She exhibits no distension. There is no hepatosplenomegaly. There is no tenderness. There is no rigidity, no rebound and no guarding.   Musculoskeletal: Normal range of motion. She exhibits no edema, tenderness or deformity.   Neurological: She is alert and oriented to person, place, and time. She has normal reflexes.   Skin: Skin is warm and dry. No rash noted. No pallor.   Psychiatric: She has a normal mood and affect. Her behavior is normal. Judgment and thought content normal.     Assessment/Plan   Hannah was seen today for gi problem.    Diagnoses and all orders for this visit:    Irritable bowel syndrome with diarrhea    Right upper quadrant abdominal pain    Nonsmoker    Long discussion regarding her symptoms and today she is more concerned with her bowels and loose nature of them. She does associate it with her Metformin and I have suggested she discuss this with her PCP. I have discussed fiber with her and dietary management.   Abdominal pain discussed and she is better at this time and does not desire any further workup.       Part of this note may be an electronic transcription/translation of spoken language to printed text using the Dragon Dictation System.  Body mass index is 23.11 kg/m².  No follow-ups on file.    Patient's Body mass index is 23.11 kg/m². BMI is within normal parameters. No follow-up required..      All risks, benefits, alternatives, and indications of colonoscopy and/or Endoscopy procedure have been discussed with the patient. Risks to include perforation of the colon requiring possible surgery or colostomy, risk of bleeding from biopsies or removal of colon tissue, possibility of missing a colon polyp or cancer, or adverse drug reaction.  Benefits to include the diagnosis and management of disease of the colon and rectum. Alternatives to include barium enema, radiographic evaluation, lab testing or no intervention. Pt  verbalizes understanding and agrees.     Aga Ann Emeka, APRN  3/5/2020  1:35 PM      Obesity, Adult  Obesity is the condition of having too much total body fat. Being overweight or obese means that your weight is greater than what is considered healthy for your body size. Obesity is determined by a measurement called BMI. BMI is an estimate of body fat and is calculated from height and weight. For adults, a BMI of 30 or higher is considered obese.  Obesity can eventually lead to other health concerns and major illnesses, including:  · Stroke.  · Coronary artery disease (CAD).  · Type 2 diabetes.  · Some types of cancer, including cancers of the colon, breast, uterus, and gallbladder.  · Osteoarthritis.  · High blood pressure (hypertension).  · High cholesterol.  · Sleep apnea.  · Gallbladder stones.  · Infertility problems.  What are the causes?  The main cause of obesity is taking in (consuming) more calories than your body uses for energy. Other factors that contribute to this condition may include:  · Being born with genes that make you more likely to become obese.  · Having a medical condition that causes obesity. These conditions include:  ¨ Hypothyroidism.  ¨ Polycystic ovarian syndrome (PCOS).  ¨ Binge-eating disorder.  ¨ Cushing syndrome.  · Taking certain medicines, such as steroids, antidepressants, and seizure medicines.  · Not being physically active (sedentary lifestyle).  · Living where there are limited places to exercise safely or buy healthy foods.  · Not getting enough sleep.  What increases the risk?  The following factors may increase your risk of this condition:  · Having a family history of obesity.  · Being a woman of -American descent.  · Being a man of  descent.  What are the signs or symptoms?  Having excessive body fat is the main symptom of this condition.  How is this diagnosed?  This condition may be diagnosed based on:  · Your symptoms.  · Your medical  history.  · A physical exam. Your health care provider may measure:  ¨ Your BMI. If you are an adult with a BMI between 25 and less than 30, you are considered overweight. If you are an adult with a BMI of 30 or higher, you are considered obese.  ¨ The distances around your hips and your waist (circumferences). These may be compared to each other to help diagnose your condition.  ¨ Your skinfold thickness. Your health care provider may gently pinch a fold of your skin and measure it.  How is this treated?  Treatment for this condition often includes changing your lifestyle. Treatment may include some or all of the following:  · Dietary changes. Work with your health care provider and a dietitian to set a weight-loss goal that is healthy and reasonable for you. Dietary changes may include eating:  ¨ Smaller portions. A portion size is the amount of a particular food that is healthy for you to eat at one time. This varies from person to person.  ¨ Low-calorie or low-fat options.  ¨ More whole grains, fruits, and vegetables.  · Regular physical activity. This may include aerobic activity (cardio) and strength training.  · Medicine to help you lose weight. Your health care provider may prescribe medicine if you are unable to lose 1 pound a week after 6 weeks of eating more healthily and doing more physical activity.  · Surgery. Surgical options may include gastric banding and gastric bypass. Surgery may be done if:  ¨ Other treatments have not helped to improve your condition.  ¨ You have a BMI of 40 or higher.  ¨ You have life-threatening health problems related to obesity.  Follow these instructions at home:     Eating and drinking     · Follow recommendations from your health care provider about what you eat and drink. Your health care provider may advise you to:  ¨ Limit fast foods, sweets, and processed snack foods.  ¨ Choose low-fat options, such as low-fat milk instead of whole milk.  ¨ Eat 5 or more servings of  fruits or vegetables every day.  ¨ Eat at home more often. This gives you more control over what you eat.  ¨ Choose healthy foods when you eat out.  ¨ Learn what a healthy portion size is.  ¨ Keep low-fat snacks on hand.  ¨ Avoid sugary drinks, such as soda, fruit juice, iced tea sweetened with sugar, and flavored milk.  ¨ Eat a healthy breakfast.  · Drink enough water to keep your urine clear or pale yellow.  · Do not go without eating for long periods of time (do not fast) or follow a fad diet. Fasting and fad diets can be unhealthy and even dangerous.  Physical Activity   · Exercise regularly, as told by your health care provider. Ask your health care provider what types of exercise are safe for you and how often you should exercise.  · Warm up and stretch before being active.  · Cool down and stretch after being active.  · Rest between periods of activity.  Lifestyle   · Limit the time that you spend in front of your TV, computer, or video game system.  · Find ways to reward yourself that do not involve food.  · Limit alcohol intake to no more than 1 drink a day for nonpregnant women and 2 drinks a day for men. One drink equals 12 oz of beer, 5 oz of wine, or 1½ oz of hard liquor.  General instructions   · Keep a weight loss journal to keep track of the food you eat and how much you exercise you get.  · Take over-the-counter and prescription medicines only as told by your health care provider.  · Take vitamins and supplements only as told by your health care provider.  · Consider joining a support group. Your health care provider may be able to recommend a support group.  · Keep all follow-up visits as told by your health care provider. This is important.  Contact a health care provider if:  · You are unable to meet your weight loss goal after 6 weeks of dietary and lifestyle changes.  This information is not intended to replace advice given to you by your health care provider. Make sure you discuss any  questions you have with your health care provider.  Document Released: 01/25/2006 Document Revised: 05/22/2017 Document Reviewed: 10/05/2016  [employer] Interactive Patient Education © 2017 [employer]      If you smoke or use tobacco, 4 minutes reading provided  Steps to Quit Smoking  Smoking tobacco can be harmful to your health and can affect almost every organ in your body. Smoking puts you, and those around you, at risk for developing many serious chronic diseases. Quitting smoking is difficult, but it is one of the best things that you can do for your health. It is never too late to quit.  What are the benefits of quitting smoking?  When you quit smoking, you lower your risk of developing serious diseases and conditions, such as:  · Lung cancer or lung disease, such as COPD.  · Heart disease.  · Stroke.  · Heart attack.  · Infertility.  · Osteoporosis and bone fractures.  Additionally, symptoms such as coughing, wheezing, and shortness of breath may get better when you quit. You may also find that you get sick less often because your body is stronger at fighting off colds and infections. If you are pregnant, quitting smoking can help to reduce your chances of having a baby of low birth weight.  How do I get ready to quit?  When you decide to quit smoking, create a plan to make sure that you are successful. Before you quit:  · Pick a date to quit. Set a date within the next two weeks to give you time to prepare.  · Write down the reasons why you are quitting. Keep this list in places where you will see it often, such as on your bathroom mirror or in your car or wallet.  · Identify the people, places, things, and activities that make you want to smoke (triggers) and avoid them. Make sure to take these actions:  ¨ Throw away all cigarettes at home, at work, and in your car.  ¨ Throw away smoking accessories, such as ashtrays and lighters.  ¨ Clean your car and make sure to empty the ashtray.  ¨ Clean your home,  including curtains and carpets.  · Tell your family, friends, and coworkers that you are quitting. Support from your loved ones can make quitting easier.  · Talk with your health care provider about your options for quitting smoking.  · Find out what treatment options are covered by your health insurance.  What strategies can I use to quit smoking?  Talk with your healthcare provider about different strategies to quit smoking. Some strategies include:  · Quitting smoking altogether instead of gradually lessening how much you smoke over a period of time. Research shows that quitting “cold turkey” is more successful than gradually quitting.  · Attending in-person counseling to help you build problem-solving skills. You are more likely to have success in quitting if you attend several counseling sessions. Even short sessions of 10 minutes can be effective.  · Finding resources and support systems that can help you to quit smoking and remain smoke-free after you quit. These resources are most helpful when you use them often. They can include:  ¨ Online chats with a counselor.  ¨ Telephone quitlines.  ¨ Printed self-help materials.  ¨ Support groups or group counseling.  ¨ Text messaging programs.  ¨ Mobile phone applications.  · Taking medicines to help you quit smoking. (If you are pregnant or breastfeeding, talk with your health care provider first.) Some medicines contain nicotine and some do not. Both types of medicines help with cravings, but the medicines that include nicotine help to relieve withdrawal symptoms. Your health care provider may recommend:  ¨ Nicotine patches, gum, or lozenges.  ¨ Nicotine inhalers or sprays.  ¨ Non-nicotine medicine that is taken by mouth.  Talk with your health care provider about combining strategies, such as taking medicines while you are also receiving in-person counseling. Using these two strategies together makes you more likely to succeed in quitting than if you used either  strategy on its own.  If you are pregnant or breastfeeding, talk with your health care provider about finding counseling or other support strategies to quit smoking. Do not take medicine to help you quit smoking unless told to do so by your health care provider.  What things can I do to make it easier to quit?  Quitting smoking might feel overwhelming at first, but there is a lot that you can do to make it easier. Take these important actions:  · Reach out to your family and friends and ask that they support and encourage you during this time. Call telephone quitlines, reach out to support groups, or work with a counselor for support.  · Ask people who smoke to avoid smoking around you.  · Avoid places that trigger you to smoke, such as bars, parties, or smoke-break areas at work.  · Spend time around people who do not smoke.  · Lessen stress in your life, because stress can be a smoking trigger for some people. To lessen stress, try:  ¨ Exercising regularly.  ¨ Deep-breathing exercises.  ¨ Yoga.  ¨ Meditating.  ¨ Performing a body scan. This involves closing your eyes, scanning your body from head to toe, and noticing which parts of your body are particularly tense. Purposefully relax the muscles in those areas.  · Download or purchase mobile phone or tablet apps (applications) that can help you stick to your quit plan by providing reminders, tips, and encouragement. There are many free apps, such as QuitGuide from the CDC (Centers for Disease Control and Prevention). You can find other support for quitting smoking (smoking cessation) through smokefree.gov and other websites.  How will I feel when I quit smoking?  Within the first 24 hours of quitting smoking, you may start to feel some withdrawal symptoms. These symptoms are usually most noticeable 2-3 days after quitting, but they usually do not last beyond 2-3 weeks. Changes or symptoms that you might experience include:  · Mood swings.  · Restlessness, anxiety,  or irritation.  · Difficulty concentrating.  · Dizziness.  · Strong cravings for sugary foods in addition to nicotine.  · Mild weight gain.  · Constipation.  · Nausea.  · Coughing or a sore throat.  · Changes in how your medicines work in your body.  · A depressed mood.  · Difficulty sleeping (insomnia).  After the first 2-3 weeks of quitting, you may start to notice more positive results, such as:  · Improved sense of smell and taste.  · Decreased coughing and sore throat.  · Slower heart rate.  · Lower blood pressure.  · Clearer skin.  · The ability to breathe more easily.  · Fewer sick days.  Quitting smoking is very challenging for most people. Do not get discouraged if you are not successful the first time. Some people need to make many attempts to quit before they achieve long-term success. Do your best to stick to your quit plan, and talk with your health care provider if you have any questions or concerns.  This information is not intended to replace advice given to you by your health care provider. Make sure you discuss any questions you have with your health care provider.  Document Released: 12/12/2002 Document Revised: 08/15/2017 Document Reviewed: 05/03/2016  [employer] Interactive Patient Education © 2017 [employer]

## 2020-03-30 ENCOUNTER — APPOINTMENT (OUTPATIENT)
Dept: CT IMAGING | Facility: HOSPITAL | Age: 85
End: 2020-03-30

## 2020-03-30 ENCOUNTER — HOSPITAL ENCOUNTER (INPATIENT)
Facility: HOSPITAL | Age: 85
LOS: 4 days | Discharge: HOME | End: 2020-04-03
Attending: EMERGENCY MEDICINE | Admitting: FAMILY MEDICINE

## 2020-03-30 DIAGNOSIS — Z74.09 IMPAIRED MOBILITY: ICD-10-CM

## 2020-03-30 DIAGNOSIS — K85.90 ACUTE PANCREATITIS WITHOUT INFECTION OR NECROSIS, UNSPECIFIED PANCREATITIS TYPE: Primary | ICD-10-CM

## 2020-03-30 DIAGNOSIS — Z78.9 IMPAIRED MOBILITY AND ADLS: ICD-10-CM

## 2020-03-30 DIAGNOSIS — K82.8 ENLARGED GALLBLADDER: ICD-10-CM

## 2020-03-30 DIAGNOSIS — Z74.09 IMPAIRED MOBILITY AND ADLS: ICD-10-CM

## 2020-03-30 DIAGNOSIS — K80.20 CHOLELITHIASIS: ICD-10-CM

## 2020-03-30 PROBLEM — F03.918 DEMENTIA WITH BEHAVIORAL DISTURBANCE: Status: ACTIVE | Noted: 2020-03-30

## 2020-03-30 PROBLEM — R93.2 ABNORMAL CT SCAN, GALLBLADDER: Status: ACTIVE | Noted: 2020-03-30

## 2020-03-30 LAB
ALBUMIN SERPL-MCNC: 4.4 G/DL (ref 3.5–5.2)
ALBUMIN/GLOB SERPL: 1.6 G/DL
ALP SERPL-CCNC: 101 U/L (ref 39–117)
ALT SERPL W P-5'-P-CCNC: 66 U/L (ref 1–33)
ANION GAP SERPL CALCULATED.3IONS-SCNC: 17 MMOL/L (ref 5–15)
AST SERPL-CCNC: 106 U/L (ref 1–32)
BASOPHILS # BLD AUTO: 0.03 10*3/MM3 (ref 0–0.2)
BASOPHILS NFR BLD AUTO: 0.3 % (ref 0–1.5)
BILIRUB SERPL-MCNC: 1.1 MG/DL (ref 0.2–1.2)
BILIRUB UR QL STRIP: NEGATIVE
BUN BLD-MCNC: 18 MG/DL (ref 8–23)
BUN/CREAT SERPL: 21.7 (ref 7–25)
CALCIUM SPEC-SCNC: 9.8 MG/DL (ref 8.2–9.6)
CHLORIDE SERPL-SCNC: 94 MMOL/L (ref 98–107)
CLARITY UR: CLEAR
CO2 SERPL-SCNC: 21 MMOL/L (ref 22–29)
COLOR UR: YELLOW
CREAT BLD-MCNC: 0.83 MG/DL (ref 0.57–1)
DEPRECATED RDW RBC AUTO: 43.7 FL (ref 37–54)
EOSINOPHIL # BLD AUTO: 0.11 10*3/MM3 (ref 0–0.4)
EOSINOPHIL NFR BLD AUTO: 0.9 % (ref 0.3–6.2)
ERYTHROCYTE [DISTWIDTH] IN BLOOD BY AUTOMATED COUNT: 14.3 % (ref 12.3–15.4)
GFR SERPL CREATININE-BSD FRML MDRD: 65 ML/MIN/1.73
GLOBULIN UR ELPH-MCNC: 2.8 GM/DL
GLUCOSE BLD-MCNC: 140 MG/DL (ref 65–99)
GLUCOSE BLDC GLUCOMTR-MCNC: 122 MG/DL (ref 70–130)
GLUCOSE UR STRIP-MCNC: NEGATIVE MG/DL
HCT VFR BLD AUTO: 38.6 % (ref 34–46.6)
HGB BLD-MCNC: 13.3 G/DL (ref 12–15.9)
HGB UR QL STRIP.AUTO: NEGATIVE
IMM GRANULOCYTES # BLD AUTO: 0.03 10*3/MM3 (ref 0–0.05)
IMM GRANULOCYTES NFR BLD AUTO: 0.3 % (ref 0–0.5)
KETONES UR QL STRIP: ABNORMAL
LEUKOCYTE ESTERASE UR QL STRIP.AUTO: NEGATIVE
LIPASE SERPL-CCNC: >3000 U/L (ref 13–60)
LYMPHOCYTES # BLD AUTO: 0.96 10*3/MM3 (ref 0.7–3.1)
LYMPHOCYTES NFR BLD AUTO: 8.2 % (ref 19.6–45.3)
MCH RBC QN AUTO: 29.2 PG (ref 26.6–33)
MCHC RBC AUTO-ENTMCNC: 34.5 G/DL (ref 31.5–35.7)
MCV RBC AUTO: 84.6 FL (ref 79–97)
MONOCYTES # BLD AUTO: 0.26 10*3/MM3 (ref 0.1–0.9)
MONOCYTES NFR BLD AUTO: 2.2 % (ref 5–12)
NEUTROPHILS # BLD AUTO: 10.26 10*3/MM3 (ref 1.7–7)
NEUTROPHILS NFR BLD AUTO: 88.1 % (ref 42.7–76)
NITRITE UR QL STRIP: NEGATIVE
NRBC BLD AUTO-RTO: 0 /100 WBC (ref 0–0.2)
PH UR STRIP.AUTO: 7.5 [PH] (ref 5–8)
PLATELET # BLD AUTO: 304 10*3/MM3 (ref 140–450)
PMV BLD AUTO: 9.5 FL (ref 6–12)
POTASSIUM BLD-SCNC: 4.1 MMOL/L (ref 3.5–5.2)
PROT SERPL-MCNC: 7.2 G/DL (ref 6–8.5)
PROT UR QL STRIP: NEGATIVE
RBC # BLD AUTO: 4.56 10*6/MM3 (ref 3.77–5.28)
SODIUM BLD-SCNC: 132 MMOL/L (ref 136–145)
SP GR UR STRIP: 1.01 (ref 1–1.03)
UROBILINOGEN UR QL STRIP: ABNORMAL
WBC NRBC COR # BLD: 11.65 10*3/MM3 (ref 3.4–10.8)

## 2020-03-30 PROCEDURE — 82962 GLUCOSE BLOOD TEST: CPT

## 2020-03-30 PROCEDURE — 85025 COMPLETE CBC W/AUTO DIFF WBC: CPT | Performed by: EMERGENCY MEDICINE

## 2020-03-30 PROCEDURE — 80053 COMPREHEN METABOLIC PANEL: CPT | Performed by: EMERGENCY MEDICINE

## 2020-03-30 PROCEDURE — 99284 EMERGENCY DEPT VISIT MOD MDM: CPT

## 2020-03-30 PROCEDURE — 74177 CT ABD & PELVIS W/CONTRAST: CPT

## 2020-03-30 PROCEDURE — 25010000002 MORPHINE SULFATE (PF) 2 MG/ML SOLUTION: Performed by: EMERGENCY MEDICINE

## 2020-03-30 PROCEDURE — 83690 ASSAY OF LIPASE: CPT | Performed by: EMERGENCY MEDICINE

## 2020-03-30 PROCEDURE — 25010000002 IOPAMIDOL 61 % SOLUTION: Performed by: EMERGENCY MEDICINE

## 2020-03-30 PROCEDURE — 81003 URINALYSIS AUTO W/O SCOPE: CPT | Performed by: EMERGENCY MEDICINE

## 2020-03-30 PROCEDURE — 25010000002 ONDANSETRON PER 1 MG: Performed by: EMERGENCY MEDICINE

## 2020-03-30 PROCEDURE — 25010000003 HYDROMORPHONE 1 MG/ML SOLUTION: Performed by: EMERGENCY MEDICINE

## 2020-03-30 PROCEDURE — 94799 UNLISTED PULMONARY SVC/PX: CPT

## 2020-03-30 PROCEDURE — 25010000002 LEVOFLOXACIN PER 250 MG: Performed by: EMERGENCY MEDICINE

## 2020-03-30 RX ORDER — HYDRALAZINE HYDROCHLORIDE 20 MG/ML
10 INJECTION INTRAMUSCULAR; INTRAVENOUS EVERY 6 HOURS PRN
Status: DISCONTINUED | OUTPATIENT
Start: 2020-03-30 | End: 2020-04-03 | Stop reason: HOSPADM

## 2020-03-30 RX ORDER — ACETAMINOPHEN 650 MG/1
650 SUPPOSITORY RECTAL EVERY 4 HOURS PRN
Status: DISCONTINUED | OUTPATIENT
Start: 2020-03-30 | End: 2020-04-01 | Stop reason: SINTOL

## 2020-03-30 RX ORDER — LEVOFLOXACIN 5 MG/ML
500 INJECTION, SOLUTION INTRAVENOUS EVERY 24 HOURS
Status: DISCONTINUED | OUTPATIENT
Start: 2020-03-31 | End: 2020-03-30

## 2020-03-30 RX ORDER — ACETAMINOPHEN 160 MG/5ML
650 SOLUTION ORAL EVERY 4 HOURS PRN
Status: DISCONTINUED | OUTPATIENT
Start: 2020-03-30 | End: 2020-04-01 | Stop reason: SINTOL

## 2020-03-30 RX ORDER — SODIUM CHLORIDE 9 MG/ML
30 INJECTION, SOLUTION INTRAVENOUS CONTINUOUS
Status: DISCONTINUED | OUTPATIENT
Start: 2020-03-30 | End: 2020-04-02

## 2020-03-30 RX ORDER — NALOXONE HCL 0.4 MG/ML
0.4 VIAL (ML) INJECTION
Status: DISCONTINUED | OUTPATIENT
Start: 2020-03-30 | End: 2020-04-02

## 2020-03-30 RX ORDER — SODIUM CHLORIDE 0.9 % (FLUSH) 0.9 %
10 SYRINGE (ML) INJECTION AS NEEDED
Status: DISCONTINUED | OUTPATIENT
Start: 2020-03-30 | End: 2020-04-03 | Stop reason: HOSPADM

## 2020-03-30 RX ORDER — ONDANSETRON 4 MG/1
4 TABLET, FILM COATED ORAL EVERY 6 HOURS PRN
Status: DISCONTINUED | OUTPATIENT
Start: 2020-03-30 | End: 2020-04-03 | Stop reason: HOSPADM

## 2020-03-30 RX ORDER — LEVOFLOXACIN 5 MG/ML
500 INJECTION, SOLUTION INTRAVENOUS EVERY 24 HOURS
Status: DISCONTINUED | OUTPATIENT
Start: 2020-03-31 | End: 2020-03-31

## 2020-03-30 RX ORDER — LEVOFLOXACIN 5 MG/ML
750 INJECTION, SOLUTION INTRAVENOUS ONCE
Status: COMPLETED | OUTPATIENT
Start: 2020-03-30 | End: 2020-03-30

## 2020-03-30 RX ORDER — LEVOFLOXACIN 5 MG/ML
500 INJECTION, SOLUTION INTRAVENOUS EVERY 24 HOURS
Status: DISCONTINUED | OUTPATIENT
Start: 2020-03-30 | End: 2020-03-30 | Stop reason: SDUPTHER

## 2020-03-30 RX ORDER — ONDANSETRON 2 MG/ML
4 INJECTION INTRAMUSCULAR; INTRAVENOUS ONCE
Status: COMPLETED | OUTPATIENT
Start: 2020-03-30 | End: 2020-03-30

## 2020-03-30 RX ORDER — HYDROMORPHONE HYDROCHLORIDE 1 MG/ML
0.5 INJECTION, SOLUTION INTRAMUSCULAR; INTRAVENOUS; SUBCUTANEOUS
Status: DISCONTINUED | OUTPATIENT
Start: 2020-03-30 | End: 2020-04-02

## 2020-03-30 RX ORDER — DEXTROSE MONOHYDRATE 25 G/50ML
25 INJECTION, SOLUTION INTRAVENOUS
Status: DISCONTINUED | OUTPATIENT
Start: 2020-03-30 | End: 2020-04-03 | Stop reason: HOSPADM

## 2020-03-30 RX ORDER — MORPHINE SULFATE 2 MG/ML
2 INJECTION, SOLUTION INTRAMUSCULAR; INTRAVENOUS ONCE
Status: COMPLETED | OUTPATIENT
Start: 2020-03-30 | End: 2020-03-30

## 2020-03-30 RX ORDER — SODIUM CHLORIDE 0.9 % (FLUSH) 0.9 %
10 SYRINGE (ML) INJECTION EVERY 12 HOURS SCHEDULED
Status: DISCONTINUED | OUTPATIENT
Start: 2020-03-30 | End: 2020-04-03 | Stop reason: HOSPADM

## 2020-03-30 RX ORDER — ONDANSETRON 2 MG/ML
4 INJECTION INTRAMUSCULAR; INTRAVENOUS EVERY 6 HOURS PRN
Status: DISCONTINUED | OUTPATIENT
Start: 2020-03-30 | End: 2020-04-03 | Stop reason: HOSPADM

## 2020-03-30 RX ORDER — NICOTINE POLACRILEX 4 MG
15 LOZENGE BUCCAL
Status: DISCONTINUED | OUTPATIENT
Start: 2020-03-30 | End: 2020-04-03 | Stop reason: HOSPADM

## 2020-03-30 RX ORDER — ACETAMINOPHEN 325 MG/1
650 TABLET ORAL EVERY 4 HOURS PRN
Status: DISCONTINUED | OUTPATIENT
Start: 2020-03-30 | End: 2020-04-01 | Stop reason: SINTOL

## 2020-03-30 RX ADMIN — MORPHINE SULFATE 2 MG: 2 INJECTION, SOLUTION INTRAMUSCULAR; INTRAVENOUS at 13:31

## 2020-03-30 RX ADMIN — LEVOFLOXACIN 750 MG: 5 INJECTION, SOLUTION INTRAVENOUS at 17:19

## 2020-03-30 RX ADMIN — SODIUM CHLORIDE 125 ML/HR: 9 INJECTION, SOLUTION INTRAVENOUS at 18:35

## 2020-03-30 RX ADMIN — SODIUM CHLORIDE 500 ML: 9 INJECTION, SOLUTION INTRAVENOUS at 13:37

## 2020-03-30 RX ADMIN — METRONIDAZOLE 500 MG: 500 INJECTION, SOLUTION INTRAVENOUS at 20:19

## 2020-03-30 RX ADMIN — HYDROMORPHONE HYDROCHLORIDE 1 MG: 1 INJECTION, SOLUTION INTRAMUSCULAR; INTRAVENOUS; SUBCUTANEOUS at 14:08

## 2020-03-30 RX ADMIN — ONDANSETRON HYDROCHLORIDE 4 MG: 2 SOLUTION INTRAMUSCULAR; INTRAVENOUS at 13:39

## 2020-03-30 RX ADMIN — IOPAMIDOL 100 ML: 612 INJECTION, SOLUTION INTRAVENOUS at 15:00

## 2020-03-31 ENCOUNTER — ANESTHESIA EVENT (OUTPATIENT)
Dept: PERIOP | Facility: HOSPITAL | Age: 85
End: 2020-03-31

## 2020-03-31 ENCOUNTER — ANESTHESIA (OUTPATIENT)
Dept: PERIOP | Facility: HOSPITAL | Age: 85
End: 2020-03-31

## 2020-03-31 ENCOUNTER — APPOINTMENT (OUTPATIENT)
Dept: GENERAL RADIOLOGY | Facility: HOSPITAL | Age: 85
End: 2020-03-31

## 2020-03-31 PROBLEM — R74.01 TRANSAMINITIS: Status: ACTIVE | Noted: 2020-03-31

## 2020-03-31 LAB
ALBUMIN SERPL-MCNC: 3.5 G/DL (ref 3.5–5.2)
ALBUMIN/GLOB SERPL: 1.6 G/DL
ALP SERPL-CCNC: 142 U/L (ref 39–117)
ALT SERPL W P-5'-P-CCNC: 831 U/L (ref 1–33)
AMYLASE SERPL-CCNC: 413 U/L (ref 28–100)
ANION GAP SERPL CALCULATED.3IONS-SCNC: 13 MMOL/L (ref 5–15)
APTT PPP: 33.8 SECONDS (ref 24.1–35)
AST SERPL-CCNC: 1129 U/L (ref 1–32)
BASOPHILS # BLD AUTO: 0.03 10*3/MM3 (ref 0–0.2)
BASOPHILS NFR BLD AUTO: 0.2 % (ref 0–1.5)
BILIRUB SERPL-MCNC: 2.4 MG/DL (ref 0.2–1.2)
BUN BLD-MCNC: 19 MG/DL (ref 8–23)
BUN/CREAT SERPL: 20.9 (ref 7–25)
CALCIUM SPEC-SCNC: 8.4 MG/DL (ref 8.2–9.6)
CHLORIDE SERPL-SCNC: 101 MMOL/L (ref 98–107)
CHOLEST SERPL-MCNC: 159 MG/DL (ref 0–200)
CO2 SERPL-SCNC: 22 MMOL/L (ref 22–29)
CREAT BLD-MCNC: 0.91 MG/DL (ref 0.57–1)
DEPRECATED RDW RBC AUTO: 45.1 FL (ref 37–54)
EOSINOPHIL # BLD AUTO: 0 10*3/MM3 (ref 0–0.4)
EOSINOPHIL NFR BLD AUTO: 0 % (ref 0.3–6.2)
ERYTHROCYTE [DISTWIDTH] IN BLOOD BY AUTOMATED COUNT: 14.5 % (ref 12.3–15.4)
GFR SERPL CREATININE-BSD FRML MDRD: 58 ML/MIN/1.73
GLOBULIN UR ELPH-MCNC: 2.2 GM/DL
GLUCOSE BLD-MCNC: 119 MG/DL (ref 65–99)
GLUCOSE BLDC GLUCOMTR-MCNC: 121 MG/DL (ref 70–130)
GLUCOSE BLDC GLUCOMTR-MCNC: 159 MG/DL (ref 70–130)
GLUCOSE BLDC GLUCOMTR-MCNC: 75 MG/DL (ref 70–130)
GLUCOSE BLDC GLUCOMTR-MCNC: 89 MG/DL (ref 70–130)
HBA1C MFR BLD: 6 % (ref 4.8–5.6)
HCT VFR BLD AUTO: 31.3 % (ref 34–46.6)
HDLC SERPL-MCNC: 75 MG/DL (ref 40–60)
HGB BLD-MCNC: 10.7 G/DL (ref 12–15.9)
INR PPP: 1.18 (ref 0.91–1.09)
LDLC SERPL CALC-MCNC: 76 MG/DL (ref 0–100)
LDLC/HDLC SERPL: 1.01 {RATIO}
LIPASE SERPL-CCNC: 915 U/L (ref 13–60)
LYMPHOCYTES # BLD AUTO: 0.36 10*3/MM3 (ref 0.7–3.1)
LYMPHOCYTES NFR BLD AUTO: 2.5 % (ref 19.6–45.3)
MCH RBC QN AUTO: 28.9 PG (ref 26.6–33)
MCHC RBC AUTO-ENTMCNC: 34.2 G/DL (ref 31.5–35.7)
MCV RBC AUTO: 84.6 FL (ref 79–97)
MONOCYTES # BLD AUTO: 0.76 10*3/MM3 (ref 0.1–0.9)
MONOCYTES NFR BLD AUTO: 5.3 % (ref 5–12)
NEUTROPHILS # BLD AUTO: 13.01 10*3/MM3 (ref 1.7–7)
NEUTROPHILS NFR BLD AUTO: 91.4 % (ref 42.7–76)
PLATELET # BLD AUTO: 226 10*3/MM3 (ref 140–450)
PMV BLD AUTO: 9.6 FL (ref 6–12)
POTASSIUM BLD-SCNC: 4 MMOL/L (ref 3.5–5.2)
PROT SERPL-MCNC: 5.7 G/DL (ref 6–8.5)
PROTHROMBIN TIME: 14.9 SECONDS (ref 11.9–14.6)
RBC # BLD AUTO: 3.7 10*6/MM3 (ref 3.77–5.28)
SODIUM BLD-SCNC: 136 MMOL/L (ref 136–145)
TRIGL SERPL-MCNC: 42 MG/DL (ref 0–150)
VLDLC SERPL-MCNC: 8.4 MG/DL
WBC NRBC COR # BLD: 14.24 10*3/MM3 (ref 3.4–10.8)

## 2020-03-31 PROCEDURE — 85025 COMPLETE CBC W/AUTO DIFF WBC: CPT | Performed by: NURSE PRACTITIONER

## 2020-03-31 PROCEDURE — 76000 FLUOROSCOPY <1 HR PHYS/QHP: CPT

## 2020-03-31 PROCEDURE — 25010000002 NEOSTIGMINE 10 MG/10ML SOLUTION: Performed by: NURSE ANESTHETIST, CERTIFIED REGISTERED

## 2020-03-31 PROCEDURE — 85610 PROTHROMBIN TIME: CPT | Performed by: SPECIALIST

## 2020-03-31 PROCEDURE — 82962 GLUCOSE BLOOD TEST: CPT

## 2020-03-31 PROCEDURE — 80061 LIPID PANEL: CPT | Performed by: NURSE PRACTITIONER

## 2020-03-31 PROCEDURE — 74300 X-RAY BILE DUCTS/PANCREAS: CPT

## 2020-03-31 PROCEDURE — BF11YZZ FLUOROSCOPY OF BILIARY AND PANCREATIC DUCTS USING OTHER CONTRAST: ICD-10-PCS | Performed by: SPECIALIST

## 2020-03-31 PROCEDURE — 94799 UNLISTED PULMONARY SVC/PX: CPT

## 2020-03-31 PROCEDURE — 25010000002 FENTANYL CITRATE (PF) 250 MCG/5ML SOLUTION: Performed by: NURSE ANESTHETIST, CERTIFIED REGISTERED

## 2020-03-31 PROCEDURE — 83036 HEMOGLOBIN GLYCOSYLATED A1C: CPT | Performed by: NURSE PRACTITIONER

## 2020-03-31 PROCEDURE — 88304 TISSUE EXAM BY PATHOLOGIST: CPT | Performed by: SPECIALIST

## 2020-03-31 PROCEDURE — 83690 ASSAY OF LIPASE: CPT | Performed by: NURSE PRACTITIONER

## 2020-03-31 PROCEDURE — 25010000002 HYDROMORPHONE PER 4 MG: Performed by: NURSE PRACTITIONER

## 2020-03-31 PROCEDURE — 25010000002 DEXAMETHASONE PER 1 MG: Performed by: NURSE ANESTHETIST, CERTIFIED REGISTERED

## 2020-03-31 PROCEDURE — 63710000001 ONDANSETRON PER 8 MG: Performed by: SPECIALIST

## 2020-03-31 PROCEDURE — C1894 INTRO/SHEATH, NON-LASER: HCPCS | Performed by: SPECIALIST

## 2020-03-31 PROCEDURE — 85730 THROMBOPLASTIN TIME PARTIAL: CPT | Performed by: SPECIALIST

## 2020-03-31 PROCEDURE — 25010000002 IOPAMIDOL 61 % SOLUTION: Performed by: SPECIALIST

## 2020-03-31 PROCEDURE — 80053 COMPREHEN METABOLIC PANEL: CPT | Performed by: NURSE PRACTITIONER

## 2020-03-31 PROCEDURE — 25010000002 ONDANSETRON PER 1 MG: Performed by: NURSE ANESTHETIST, CERTIFIED REGISTERED

## 2020-03-31 PROCEDURE — 0FT44ZZ RESECTION OF GALLBLADDER, PERCUTANEOUS ENDOSCOPIC APPROACH: ICD-10-PCS | Performed by: SPECIALIST

## 2020-03-31 PROCEDURE — 25010000002 SUCCINYLCHOLINE PER 20 MG: Performed by: NURSE ANESTHETIST, CERTIFIED REGISTERED

## 2020-03-31 PROCEDURE — 25010000002 PROPOFOL 10 MG/ML EMULSION: Performed by: NURSE ANESTHETIST, CERTIFIED REGISTERED

## 2020-03-31 PROCEDURE — 25010000002 ONDANSETRON PER 1 MG: Performed by: NURSE PRACTITIONER

## 2020-03-31 PROCEDURE — 82150 ASSAY OF AMYLASE: CPT | Performed by: SPECIALIST

## 2020-03-31 RX ORDER — SODIUM CHLORIDE 9 MG/ML
INJECTION, SOLUTION INTRAVENOUS AS NEEDED
Status: DISCONTINUED | OUTPATIENT
Start: 2020-03-31 | End: 2020-03-31 | Stop reason: HOSPADM

## 2020-03-31 RX ORDER — LIDOCAINE HYDROCHLORIDE 10 MG/ML
0.5 INJECTION, SOLUTION EPIDURAL; INFILTRATION; INTRACAUDAL; PERINEURAL ONCE AS NEEDED
Status: DISCONTINUED | OUTPATIENT
Start: 2020-03-31 | End: 2020-03-31 | Stop reason: HOSPADM

## 2020-03-31 RX ORDER — NALOXONE HCL 0.4 MG/ML
0.04 VIAL (ML) INJECTION AS NEEDED
Status: DISCONTINUED | OUTPATIENT
Start: 2020-03-31 | End: 2020-03-31 | Stop reason: HOSPADM

## 2020-03-31 RX ORDER — METOPROLOL TARTRATE 5 MG/5ML
INJECTION INTRAVENOUS AS NEEDED
Status: DISCONTINUED | OUTPATIENT
Start: 2020-03-31 | End: 2020-03-31 | Stop reason: SURG

## 2020-03-31 RX ORDER — MAGNESIUM HYDROXIDE 1200 MG/15ML
LIQUID ORAL AS NEEDED
Status: DISCONTINUED | OUTPATIENT
Start: 2020-03-31 | End: 2020-03-31 | Stop reason: HOSPADM

## 2020-03-31 RX ORDER — BUPIVACAINE HYDROCHLORIDE AND EPINEPHRINE 5; 5 MG/ML; UG/ML
INJECTION, SOLUTION PERINEURAL AS NEEDED
Status: DISCONTINUED | OUTPATIENT
Start: 2020-03-31 | End: 2020-03-31 | Stop reason: HOSPADM

## 2020-03-31 RX ORDER — GLYCOPYRROLATE 0.2 MG/ML
INJECTION INTRAMUSCULAR; INTRAVENOUS AS NEEDED
Status: DISCONTINUED | OUTPATIENT
Start: 2020-03-31 | End: 2020-03-31 | Stop reason: SURG

## 2020-03-31 RX ORDER — ONDANSETRON 2 MG/ML
INJECTION INTRAMUSCULAR; INTRAVENOUS AS NEEDED
Status: DISCONTINUED | OUTPATIENT
Start: 2020-03-31 | End: 2020-03-31 | Stop reason: SURG

## 2020-03-31 RX ORDER — HYDROCODONE BITARTRATE AND ACETAMINOPHEN 7.5; 325 MG/1; MG/1
1 TABLET ORAL EVERY 6 HOURS PRN
Status: DISCONTINUED | OUTPATIENT
Start: 2020-03-31 | End: 2020-04-01

## 2020-03-31 RX ORDER — FLUMAZENIL 0.1 MG/ML
0.2 INJECTION INTRAVENOUS AS NEEDED
Status: DISCONTINUED | OUTPATIENT
Start: 2020-03-31 | End: 2020-03-31 | Stop reason: HOSPADM

## 2020-03-31 RX ORDER — FENTANYL CITRATE 50 UG/ML
25 INJECTION, SOLUTION INTRAMUSCULAR; INTRAVENOUS
Status: DISCONTINUED | OUTPATIENT
Start: 2020-03-31 | End: 2020-03-31 | Stop reason: HOSPADM

## 2020-03-31 RX ORDER — SODIUM CHLORIDE 0.9 % (FLUSH) 0.9 %
10 SYRINGE (ML) INJECTION AS NEEDED
Status: DISCONTINUED | OUTPATIENT
Start: 2020-03-31 | End: 2020-03-31 | Stop reason: HOSPADM

## 2020-03-31 RX ORDER — LIDOCAINE HYDROCHLORIDE 20 MG/ML
INJECTION, SOLUTION INFILTRATION; PERINEURAL AS NEEDED
Status: DISCONTINUED | OUTPATIENT
Start: 2020-03-31 | End: 2020-03-31 | Stop reason: SURG

## 2020-03-31 RX ORDER — FENTANYL CITRATE 50 UG/ML
INJECTION, SOLUTION INTRAMUSCULAR; INTRAVENOUS AS NEEDED
Status: DISCONTINUED | OUTPATIENT
Start: 2020-03-31 | End: 2020-03-31 | Stop reason: SURG

## 2020-03-31 RX ORDER — DEXTROSE MONOHYDRATE 25 G/50ML
12.5 INJECTION, SOLUTION INTRAVENOUS AS NEEDED
Status: DISCONTINUED | OUTPATIENT
Start: 2020-03-31 | End: 2020-03-31 | Stop reason: HOSPADM

## 2020-03-31 RX ORDER — LABETALOL HYDROCHLORIDE 5 MG/ML
5 INJECTION, SOLUTION INTRAVENOUS
Status: DISCONTINUED | OUTPATIENT
Start: 2020-03-31 | End: 2020-03-31 | Stop reason: HOSPADM

## 2020-03-31 RX ORDER — FENTANYL CITRATE 50 UG/ML
25 INJECTION, SOLUTION INTRAMUSCULAR; INTRAVENOUS
Status: DISCONTINUED | OUTPATIENT
Start: 2020-03-31 | End: 2020-03-31 | Stop reason: SDUPTHER

## 2020-03-31 RX ORDER — OXYCODONE AND ACETAMINOPHEN 10; 325 MG/1; MG/1
1 TABLET ORAL ONCE AS NEEDED
Status: COMPLETED | OUTPATIENT
Start: 2020-03-31 | End: 2020-03-31

## 2020-03-31 RX ORDER — SUCCINYLCHOLINE CHLORIDE 20 MG/ML
INJECTION INTRAMUSCULAR; INTRAVENOUS AS NEEDED
Status: DISCONTINUED | OUTPATIENT
Start: 2020-03-31 | End: 2020-03-31 | Stop reason: SURG

## 2020-03-31 RX ORDER — PROPOFOL 10 MG/ML
VIAL (ML) INTRAVENOUS AS NEEDED
Status: DISCONTINUED | OUTPATIENT
Start: 2020-03-31 | End: 2020-03-31 | Stop reason: SURG

## 2020-03-31 RX ORDER — EPHEDRINE SULFATE 50 MG/ML
INJECTION INTRAVENOUS AS NEEDED
Status: DISCONTINUED | OUTPATIENT
Start: 2020-03-31 | End: 2020-03-31 | Stop reason: SURG

## 2020-03-31 RX ORDER — LEVOFLOXACIN 5 MG/ML
250 INJECTION, SOLUTION INTRAVENOUS EVERY 24 HOURS
Status: DISCONTINUED | OUTPATIENT
Start: 2020-03-31 | End: 2020-04-03 | Stop reason: HOSPADM

## 2020-03-31 RX ORDER — ESMOLOL HYDROCHLORIDE 10 MG/ML
INJECTION INTRAVENOUS AS NEEDED
Status: DISCONTINUED | OUTPATIENT
Start: 2020-03-31 | End: 2020-03-31 | Stop reason: SURG

## 2020-03-31 RX ORDER — MORPHINE SULFATE 2 MG/ML
2 INJECTION, SOLUTION INTRAMUSCULAR; INTRAVENOUS
Status: DISCONTINUED | OUTPATIENT
Start: 2020-03-31 | End: 2020-03-31 | Stop reason: HOSPADM

## 2020-03-31 RX ORDER — SODIUM CHLORIDE, SODIUM LACTATE, POTASSIUM CHLORIDE, CALCIUM CHLORIDE 600; 310; 30; 20 MG/100ML; MG/100ML; MG/100ML; MG/100ML
9 INJECTION, SOLUTION INTRAVENOUS CONTINUOUS
Status: DISCONTINUED | OUTPATIENT
Start: 2020-03-31 | End: 2020-04-01

## 2020-03-31 RX ORDER — DEXAMETHASONE SODIUM PHOSPHATE 4 MG/ML
INJECTION, SOLUTION INTRA-ARTICULAR; INTRALESIONAL; INTRAMUSCULAR; INTRAVENOUS; SOFT TISSUE AS NEEDED
Status: DISCONTINUED | OUTPATIENT
Start: 2020-03-31 | End: 2020-03-31 | Stop reason: SURG

## 2020-03-31 RX ORDER — ONDANSETRON 2 MG/ML
4 INJECTION INTRAMUSCULAR; INTRAVENOUS AS NEEDED
Status: DISCONTINUED | OUTPATIENT
Start: 2020-03-31 | End: 2020-03-31 | Stop reason: HOSPADM

## 2020-03-31 RX ORDER — NEOSTIGMINE METHYLSULFATE 1 MG/ML
INJECTION, SOLUTION INTRAVENOUS AS NEEDED
Status: DISCONTINUED | OUTPATIENT
Start: 2020-03-31 | End: 2020-03-31 | Stop reason: SURG

## 2020-03-31 RX ORDER — OXYCODONE AND ACETAMINOPHEN 7.5; 325 MG/1; MG/1
2 TABLET ORAL ONCE AS NEEDED
Status: DISCONTINUED | OUTPATIENT
Start: 2020-03-31 | End: 2020-03-31 | Stop reason: HOSPADM

## 2020-03-31 RX ORDER — SODIUM CHLORIDE 0.9 % (FLUSH) 0.9 %
10 SYRINGE (ML) INJECTION EVERY 12 HOURS SCHEDULED
Status: DISCONTINUED | OUTPATIENT
Start: 2020-03-31 | End: 2020-03-31 | Stop reason: HOSPADM

## 2020-03-31 RX ORDER — ROCURONIUM BROMIDE 10 MG/ML
INJECTION, SOLUTION INTRAVENOUS AS NEEDED
Status: DISCONTINUED | OUTPATIENT
Start: 2020-03-31 | End: 2020-03-31 | Stop reason: SURG

## 2020-03-31 RX ADMIN — FENTANYL CITRATE 50 MCG: 50 INJECTION INTRAMUSCULAR; INTRAVENOUS at 15:14

## 2020-03-31 RX ADMIN — FENTANYL CITRATE 50 MCG: 50 INJECTION INTRAMUSCULAR; INTRAVENOUS at 15:16

## 2020-03-31 RX ADMIN — ROCURONIUM BROMIDE 20 MG: 10 INJECTION INTRAVENOUS at 14:47

## 2020-03-31 RX ADMIN — SUCCINYLCHOLINE CHLORIDE 100 MG: 20 INJECTION, SOLUTION INTRAMUSCULAR; INTRAVENOUS at 14:43

## 2020-03-31 RX ADMIN — METRONIDAZOLE 500 MG: 500 INJECTION, SOLUTION INTRAVENOUS at 11:40

## 2020-03-31 RX ADMIN — SODIUM CHLORIDE, POTASSIUM CHLORIDE, SODIUM LACTATE AND CALCIUM CHLORIDE 9 ML/HR: 600; 310; 30; 20 INJECTION, SOLUTION INTRAVENOUS at 12:50

## 2020-03-31 RX ADMIN — ONDANSETRON 4 MG: 4 TABLET, FILM COATED ORAL at 21:51

## 2020-03-31 RX ADMIN — SODIUM CHLORIDE, PRESERVATIVE FREE 10 ML: 5 INJECTION INTRAVENOUS at 21:33

## 2020-03-31 RX ADMIN — METOPROLOL TARTRATE 2 MG: 5 INJECTION INTRAVENOUS at 16:08

## 2020-03-31 RX ADMIN — SUGAMMADEX 100 MG: 100 INJECTION, SOLUTION INTRAVENOUS at 16:18

## 2020-03-31 RX ADMIN — METRONIDAZOLE 500 MG: 500 INJECTION, SOLUTION INTRAVENOUS at 21:34

## 2020-03-31 RX ADMIN — EPHEDRINE SULFATE 10 MG: 50 INJECTION INTRAVENOUS at 15:03

## 2020-03-31 RX ADMIN — ROCURONIUM BROMIDE 15 MG: 10 INJECTION INTRAVENOUS at 15:09

## 2020-03-31 RX ADMIN — ESMOLOL HYDROCHLORIDE 10 MG: 10 INJECTION, SOLUTION INTRAVENOUS at 15:27

## 2020-03-31 RX ADMIN — ESMOLOL HYDROCHLORIDE 10 MG: 10 INJECTION, SOLUTION INTRAVENOUS at 15:30

## 2020-03-31 RX ADMIN — SODIUM CHLORIDE, POTASSIUM CHLORIDE, SODIUM LACTATE AND CALCIUM CHLORIDE: 600; 310; 30; 20 INJECTION, SOLUTION INTRAVENOUS at 16:00

## 2020-03-31 RX ADMIN — FENTANYL CITRATE 100 MCG: 50 INJECTION INTRAMUSCULAR; INTRAVENOUS at 14:43

## 2020-03-31 RX ADMIN — HYDROMORPHONE HYDROCHLORIDE 0.5 MG: 1 INJECTION, SOLUTION INTRAMUSCULAR; INTRAVENOUS; SUBCUTANEOUS at 11:48

## 2020-03-31 RX ADMIN — ROCURONIUM BROMIDE 5 MG: 10 INJECTION INTRAVENOUS at 14:43

## 2020-03-31 RX ADMIN — HYDROCODONE BITARTRATE AND ACETAMINOPHEN 1 TABLET: 7.5; 325 TABLET ORAL at 18:58

## 2020-03-31 RX ADMIN — OXYCODONE HYDROCHLORIDE AND ACETAMINOPHEN 1 TABLET: 10; 325 TABLET ORAL at 17:13

## 2020-03-31 RX ADMIN — ESMOLOL HYDROCHLORIDE 10 MG: 10 INJECTION, SOLUTION INTRAVENOUS at 15:45

## 2020-03-31 RX ADMIN — ONDANSETRON HYDROCHLORIDE 4 MG: 2 SOLUTION INTRAMUSCULAR; INTRAVENOUS at 11:48

## 2020-03-31 RX ADMIN — ONDANSETRON HYDROCHLORIDE 4 MG: 2 SOLUTION INTRAMUSCULAR; INTRAVENOUS at 15:39

## 2020-03-31 RX ADMIN — DEXAMETHASONE SODIUM PHOSPHATE 4 MG: 4 INJECTION, SOLUTION INTRAMUSCULAR; INTRAVENOUS at 14:50

## 2020-03-31 RX ADMIN — NEOSTIGMINE METHYLSULFATE 3 MG: 1 INJECTION INTRAVENOUS at 15:39

## 2020-03-31 RX ADMIN — FENTANYL CITRATE 50 MCG: 50 INJECTION INTRAMUSCULAR; INTRAVENOUS at 15:20

## 2020-03-31 RX ADMIN — SODIUM CHLORIDE 125 ML/HR: 9 INJECTION, SOLUTION INTRAVENOUS at 21:34

## 2020-03-31 RX ADMIN — GLYCOPYRROLATE 0.4 MG: 0.2 INJECTION, SOLUTION INTRAMUSCULAR; INTRAVENOUS at 15:39

## 2020-03-31 RX ADMIN — SODIUM CHLORIDE 125 ML/HR: 9 INJECTION, SOLUTION INTRAVENOUS at 02:47

## 2020-03-31 RX ADMIN — PROPOFOL 100 MG: 10 INJECTION, EMULSION INTRAVENOUS at 14:43

## 2020-03-31 RX ADMIN — METRONIDAZOLE 500 MG: 500 INJECTION, SOLUTION INTRAVENOUS at 02:47

## 2020-03-31 RX ADMIN — LIDOCAINE HYDROCHLORIDE 80 MG: 20 INJECTION, SOLUTION INFILTRATION; PERINEURAL at 14:43

## 2020-04-01 LAB
ALBUMIN SERPL-MCNC: 3.4 G/DL (ref 3.5–5.2)
ALBUMIN/GLOB SERPL: 1.4 G/DL
ALP SERPL-CCNC: 119 U/L (ref 39–117)
ALT SERPL W P-5'-P-CCNC: 537 U/L (ref 1–33)
AMYLASE SERPL-CCNC: 82 U/L (ref 28–100)
ANION GAP SERPL CALCULATED.3IONS-SCNC: 12 MMOL/L (ref 5–15)
AST SERPL-CCNC: 391 U/L (ref 1–32)
BILIRUB SERPL-MCNC: 0.8 MG/DL (ref 0.2–1.2)
BUN BLD-MCNC: 16 MG/DL (ref 8–23)
BUN/CREAT SERPL: 23.9 (ref 7–25)
CALCIUM SPEC-SCNC: 8.5 MG/DL (ref 8.2–9.6)
CHLORIDE SERPL-SCNC: 104 MMOL/L (ref 98–107)
CO2 SERPL-SCNC: 22 MMOL/L (ref 22–29)
CREAT BLD-MCNC: 0.67 MG/DL (ref 0.57–1)
DEPRECATED RDW RBC AUTO: 47.7 FL (ref 37–54)
ERYTHROCYTE [DISTWIDTH] IN BLOOD BY AUTOMATED COUNT: 15 % (ref 12.3–15.4)
GFR SERPL CREATININE-BSD FRML MDRD: 83 ML/MIN/1.73
GLOBULIN UR ELPH-MCNC: 2.4 GM/DL
GLUCOSE BLD-MCNC: 140 MG/DL (ref 65–99)
GLUCOSE BLDC GLUCOMTR-MCNC: 106 MG/DL (ref 70–130)
GLUCOSE BLDC GLUCOMTR-MCNC: 119 MG/DL (ref 70–130)
HCT VFR BLD AUTO: 31.4 % (ref 34–46.6)
HGB BLD-MCNC: 10.5 G/DL (ref 12–15.9)
LIPASE SERPL-CCNC: 42 U/L (ref 13–60)
LYMPHOCYTES # BLD MANUAL: 0.28 10*3/MM3 (ref 0.7–3.1)
LYMPHOCYTES NFR BLD MANUAL: 2 % (ref 19.6–45.3)
LYMPHOCYTES NFR BLD MANUAL: 2 % (ref 5–12)
MCH RBC QN AUTO: 28.9 PG (ref 26.6–33)
MCHC RBC AUTO-ENTMCNC: 33.4 G/DL (ref 31.5–35.7)
MCV RBC AUTO: 86.5 FL (ref 79–97)
MONOCYTES # BLD AUTO: 0.28 10*3/MM3 (ref 0.1–0.9)
NEUTROPHILS # BLD AUTO: 13.34 10*3/MM3 (ref 1.7–7)
NEUTROPHILS NFR BLD MANUAL: 81 % (ref 42.7–76)
NEUTS BAND NFR BLD MANUAL: 14 % (ref 0–5)
PLAT MORPH BLD: NORMAL
PLATELET # BLD AUTO: 205 10*3/MM3 (ref 140–450)
PMV BLD AUTO: 10.3 FL (ref 6–12)
POTASSIUM BLD-SCNC: 4.2 MMOL/L (ref 3.5–5.2)
PROT SERPL-MCNC: 5.8 G/DL (ref 6–8.5)
RBC # BLD AUTO: 3.63 10*6/MM3 (ref 3.77–5.28)
RBC MORPH BLD: NORMAL
SODIUM BLD-SCNC: 138 MMOL/L (ref 136–145)
VARIANT LYMPHS NFR BLD MANUAL: 1 % (ref 0–5)
WBC MORPH BLD: NORMAL
WBC NRBC COR # BLD: 14.04 10*3/MM3 (ref 3.4–10.8)

## 2020-04-01 PROCEDURE — 82150 ASSAY OF AMYLASE: CPT | Performed by: SPECIALIST

## 2020-04-01 PROCEDURE — 82962 GLUCOSE BLOOD TEST: CPT

## 2020-04-01 PROCEDURE — 97165 OT EVAL LOW COMPLEX 30 MIN: CPT | Performed by: OCCUPATIONAL THERAPIST

## 2020-04-01 PROCEDURE — 80053 COMPREHEN METABOLIC PANEL: CPT | Performed by: SPECIALIST

## 2020-04-01 PROCEDURE — 85007 BL SMEAR W/DIFF WBC COUNT: CPT | Performed by: SPECIALIST

## 2020-04-01 PROCEDURE — 97161 PT EVAL LOW COMPLEX 20 MIN: CPT | Performed by: PHYSICAL THERAPIST

## 2020-04-01 PROCEDURE — 85025 COMPLETE CBC W/AUTO DIFF WBC: CPT | Performed by: SPECIALIST

## 2020-04-01 PROCEDURE — 25010000002 LEVOFLOXACIN PER 250 MG: Performed by: SPECIALIST

## 2020-04-01 PROCEDURE — 83690 ASSAY OF LIPASE: CPT | Performed by: SPECIALIST

## 2020-04-01 RX ORDER — HYDROCODONE BITARTRATE AND ACETAMINOPHEN 5; 325 MG/1; MG/1
1 TABLET ORAL EVERY 6 HOURS PRN
Status: DISCONTINUED | OUTPATIENT
Start: 2020-04-01 | End: 2020-04-01

## 2020-04-01 RX ORDER — METOPROLOL SUCCINATE 25 MG/1
25 TABLET, EXTENDED RELEASE ORAL DAILY
Status: DISCONTINUED | OUTPATIENT
Start: 2020-04-01 | End: 2020-04-03 | Stop reason: HOSPADM

## 2020-04-01 RX ORDER — ONDANSETRON HCL IN 0.9 % NACL 8 MG/50 ML
8 INTRAVENOUS SOLUTION, PIGGYBACK (ML) INTRAVENOUS EVERY 6 HOURS PRN
Status: DISCONTINUED | OUTPATIENT
Start: 2020-04-01 | End: 2020-04-03 | Stop reason: HOSPADM

## 2020-04-01 RX ORDER — ONDANSETRON 8 MG/1
8 TABLET, ORALLY DISINTEGRATING ORAL EVERY 6 HOURS PRN
Status: DISCONTINUED | OUTPATIENT
Start: 2020-04-01 | End: 2020-04-03 | Stop reason: HOSPADM

## 2020-04-01 RX ORDER — AMLODIPINE BESYLATE 5 MG/1
5 TABLET ORAL DAILY
Status: DISCONTINUED | OUTPATIENT
Start: 2020-04-01 | End: 2020-04-03 | Stop reason: HOSPADM

## 2020-04-01 RX ORDER — HYDROCODONE BITARTRATE AND ACETAMINOPHEN 7.5; 325 MG/1; MG/1
1 TABLET ORAL EVERY 4 HOURS PRN
Status: DISCONTINUED | OUTPATIENT
Start: 2020-04-01 | End: 2020-04-01 | Stop reason: SINTOL

## 2020-04-01 RX ORDER — SIMETHICONE 80 MG
80 TABLET,CHEWABLE ORAL 4 TIMES DAILY PRN
Status: DISCONTINUED | OUTPATIENT
Start: 2020-04-01 | End: 2020-04-03 | Stop reason: HOSPADM

## 2020-04-01 RX ORDER — HYDROCODONE BITARTRATE AND ACETAMINOPHEN 5; 325 MG/1; MG/1
1 TABLET ORAL EVERY 4 HOURS PRN
Status: DISCONTINUED | OUTPATIENT
Start: 2020-04-01 | End: 2020-04-03 | Stop reason: HOSPADM

## 2020-04-01 RX ORDER — FAMOTIDINE 20 MG/1
20 TABLET, FILM COATED ORAL 2 TIMES DAILY
Status: DISCONTINUED | OUTPATIENT
Start: 2020-04-01 | End: 2020-04-03 | Stop reason: HOSPADM

## 2020-04-01 RX ORDER — LISINOPRIL 20 MG/1
20 TABLET ORAL
Status: DISCONTINUED | OUTPATIENT
Start: 2020-04-01 | End: 2020-04-03 | Stop reason: HOSPADM

## 2020-04-01 RX ORDER — SUCRALFATE ORAL 1 G/10ML
1 SUSPENSION ORAL EVERY 6 HOURS SCHEDULED
Status: DISCONTINUED | OUTPATIENT
Start: 2020-04-01 | End: 2020-04-03 | Stop reason: HOSPADM

## 2020-04-01 RX ORDER — DONEPEZIL HYDROCHLORIDE 10 MG/1
10 TABLET, FILM COATED ORAL NIGHTLY
Status: DISCONTINUED | OUTPATIENT
Start: 2020-04-01 | End: 2020-04-03 | Stop reason: HOSPADM

## 2020-04-01 RX ADMIN — SUCRALFATE 1 G: 1 SUSPENSION ORAL at 23:43

## 2020-04-01 RX ADMIN — DONEPEZIL HYDROCHLORIDE 10 MG: 10 TABLET, FILM COATED ORAL at 20:04

## 2020-04-01 RX ADMIN — METRONIDAZOLE 500 MG: 500 INJECTION, SOLUTION INTRAVENOUS at 10:47

## 2020-04-01 RX ADMIN — SUCRALFATE 1 G: 1 SUSPENSION ORAL at 20:04

## 2020-04-01 RX ADMIN — HYDROCODONE BITARTRATE AND ACETAMINOPHEN 1 TABLET: 7.5; 325 TABLET ORAL at 07:35

## 2020-04-01 RX ADMIN — METOPROLOL SUCCINATE 25 MG: 25 TABLET, EXTENDED RELEASE ORAL at 08:10

## 2020-04-01 RX ADMIN — METRONIDAZOLE 500 MG: 500 INJECTION, SOLUTION INTRAVENOUS at 04:32

## 2020-04-01 RX ADMIN — METRONIDAZOLE 500 MG: 500 INJECTION, SOLUTION INTRAVENOUS at 20:04

## 2020-04-01 RX ADMIN — FAMOTIDINE 20 MG: 20 TABLET, FILM COATED ORAL at 20:32

## 2020-04-01 RX ADMIN — HYDROCODONE BITARTRATE AND ACETAMINOPHEN 1 TABLET: 7.5; 325 TABLET ORAL at 12:26

## 2020-04-01 RX ADMIN — LEVOFLOXACIN 250 MG: 5 INJECTION, SOLUTION INTRAVENOUS at 16:47

## 2020-04-01 RX ADMIN — AMLODIPINE BESYLATE 5 MG: 5 TABLET ORAL at 08:09

## 2020-04-01 RX ADMIN — HYDROCODONE BITARTRATE AND ACETAMINOPHEN 1 TABLET: 5; 325 TABLET ORAL at 21:19

## 2020-04-01 RX ADMIN — LISINOPRIL 20 MG: 20 TABLET ORAL at 08:09

## 2020-04-01 RX ADMIN — HYDROCODONE BITARTRATE AND ACETAMINOPHEN 1 TABLET: 7.5; 325 TABLET ORAL at 16:47

## 2020-04-01 RX ADMIN — SODIUM CHLORIDE 125 ML/HR: 9 INJECTION, SOLUTION INTRAVENOUS at 08:10

## 2020-04-01 NOTE — ANESTHESIA POSTPROCEDURE EVALUATION
"Patient: Hannah Dasilva    Procedure Summary     Date:  03/31/20 Room / Location:   [facility] 04 /  [facility]    Anesthesia Start:  1435 Anesthesia Stop:  1642    Procedure:  CHOLECYSTECTOMY LAPAROSCOPIC INTRAOPERATIVE CHOLANGIOGRAM (N/A Abdomen) Diagnosis:      Surgeon:  Jenna Kinney MD Provider:  Stacie Kebede CRNA    Anesthesia Type:  general ASA Status:  2          Anesthesia Type: general    Vitals  Vitals Value Taken Time   /75 3/31/2020  5:27 PM   Temp 98.2 °F (36.8 °C) 3/31/2020  5:10 PM   Pulse 117 3/31/2020  5:28 PM   Resp 14 3/31/2020  5:25 PM   SpO2 95 % 3/31/2020  5:28 PM   Vitals shown include unvalidated device data.        Post Anesthesia Care and Evaluation    Patient location during evaluation: PACU  Patient participation: complete - patient participated  Level of consciousness: awake and alert  Pain management: adequate  Airway patency: patent  Anesthetic complications: No anesthetic complications    Cardiovascular status: acceptable  Respiratory status: acceptable  Hydration status: acceptable    Comments: Blood pressure 128/72, pulse 69, temperature 98 °F (36.7 °C), temperature source Oral, resp. rate 16, height 172.7 cm (68\"), weight 76.9 kg (169 lb 9.6 oz), SpO2 96 %, not currently breastfeeding.    Pt discharged from PACU based on major score >8      "

## 2020-04-02 LAB
ALBUMIN SERPL-MCNC: 4 G/DL (ref 3.5–5.2)
ALBUMIN/GLOB SERPL: 1.4 G/DL
ALP SERPL-CCNC: 133 U/L (ref 39–117)
ALT SERPL W P-5'-P-CCNC: 393 U/L (ref 1–33)
ANION GAP SERPL CALCULATED.3IONS-SCNC: 13 MMOL/L (ref 5–15)
AST SERPL-CCNC: 155 U/L (ref 1–32)
BASOPHILS # BLD AUTO: 0.04 10*3/MM3 (ref 0–0.2)
BASOPHILS NFR BLD AUTO: 0.4 % (ref 0–1.5)
BILIRUB SERPL-MCNC: 0.8 MG/DL (ref 0.2–1.2)
BUN BLD-MCNC: 9 MG/DL (ref 8–23)
BUN/CREAT SERPL: 17.3 (ref 7–25)
CALCIUM SPEC-SCNC: 8.9 MG/DL (ref 8.2–9.6)
CHLORIDE SERPL-SCNC: 98 MMOL/L (ref 98–107)
CO2 SERPL-SCNC: 23 MMOL/L (ref 22–29)
CREAT BLD-MCNC: 0.52 MG/DL (ref 0.57–1)
CYTO UR: NORMAL
DEPRECATED RDW RBC AUTO: 46.6 FL (ref 37–54)
EOSINOPHIL # BLD AUTO: 0.16 10*3/MM3 (ref 0–0.4)
EOSINOPHIL NFR BLD AUTO: 1.7 % (ref 0.3–6.2)
ERYTHROCYTE [DISTWIDTH] IN BLOOD BY AUTOMATED COUNT: 15 % (ref 12.3–15.4)
GFR SERPL CREATININE-BSD FRML MDRD: 111 ML/MIN/1.73
GLOBULIN UR ELPH-MCNC: 2.9 GM/DL
GLUCOSE BLD-MCNC: 109 MG/DL (ref 65–99)
GLUCOSE BLDC GLUCOMTR-MCNC: 115 MG/DL (ref 70–130)
GLUCOSE BLDC GLUCOMTR-MCNC: 117 MG/DL (ref 70–130)
GLUCOSE BLDC GLUCOMTR-MCNC: 125 MG/DL (ref 70–130)
HCT VFR BLD AUTO: 34.9 % (ref 34–46.6)
HGB BLD-MCNC: 11.7 G/DL (ref 12–15.9)
IMM GRANULOCYTES # BLD AUTO: 0.07 10*3/MM3 (ref 0–0.05)
IMM GRANULOCYTES NFR BLD AUTO: 0.8 % (ref 0–0.5)
LAB AP CASE REPORT: NORMAL
LYMPHOCYTES # BLD AUTO: 0.88 10*3/MM3 (ref 0.7–3.1)
LYMPHOCYTES NFR BLD AUTO: 9.5 % (ref 19.6–45.3)
MCH RBC QN AUTO: 28.5 PG (ref 26.6–33)
MCHC RBC AUTO-ENTMCNC: 33.5 G/DL (ref 31.5–35.7)
MCV RBC AUTO: 85.1 FL (ref 79–97)
MONOCYTES # BLD AUTO: 0.4 10*3/MM3 (ref 0.1–0.9)
MONOCYTES NFR BLD AUTO: 4.3 % (ref 5–12)
NEUTROPHILS # BLD AUTO: 7.68 10*3/MM3 (ref 1.7–7)
NEUTROPHILS NFR BLD AUTO: 83.3 % (ref 42.7–76)
NRBC BLD AUTO-RTO: 0 /100 WBC (ref 0–0.2)
PATH REPORT.FINAL DX SPEC: NORMAL
PATH REPORT.GROSS SPEC: NORMAL
PLATELET # BLD AUTO: 231 10*3/MM3 (ref 140–450)
PMV BLD AUTO: 10.1 FL (ref 6–12)
POTASSIUM BLD-SCNC: 3.8 MMOL/L (ref 3.5–5.2)
PROT SERPL-MCNC: 6.9 G/DL (ref 6–8.5)
RBC # BLD AUTO: 4.1 10*6/MM3 (ref 3.77–5.28)
SODIUM BLD-SCNC: 134 MMOL/L (ref 136–145)
WBC NRBC COR # BLD: 9.23 10*3/MM3 (ref 3.4–10.8)

## 2020-04-02 PROCEDURE — 85025 COMPLETE CBC W/AUTO DIFF WBC: CPT | Performed by: SPECIALIST

## 2020-04-02 PROCEDURE — 82962 GLUCOSE BLOOD TEST: CPT

## 2020-04-02 PROCEDURE — 97116 GAIT TRAINING THERAPY: CPT

## 2020-04-02 PROCEDURE — 25010000002 FUROSEMIDE PER 20 MG: Performed by: SPECIALIST

## 2020-04-02 PROCEDURE — 80053 COMPREHEN METABOLIC PANEL: CPT | Performed by: SPECIALIST

## 2020-04-02 PROCEDURE — 25010000002 LEVOFLOXACIN PER 250 MG: Performed by: SPECIALIST

## 2020-04-02 RX ORDER — ESTRADIOL 10 UG/1
1 INSERT VAGINAL 2 TIMES WEEKLY
COMMUNITY
End: 2021-09-15

## 2020-04-02 RX ORDER — AMOXICILLIN 250 MG
1 CAPSULE ORAL DAILY
Status: DISCONTINUED | OUTPATIENT
Start: 2020-04-02 | End: 2020-04-03 | Stop reason: HOSPADM

## 2020-04-02 RX ORDER — PREGABALIN 100 MG/1
100 CAPSULE ORAL 3 TIMES DAILY
COMMUNITY

## 2020-04-02 RX ORDER — AMLODIPINE BESYLATE 2.5 MG/1
2.5 TABLET ORAL DAILY
Status: ON HOLD | COMMUNITY
End: 2020-04-03 | Stop reason: SDUPTHER

## 2020-04-02 RX ORDER — POLYETHYLENE GLYCOL 3350 17 G/17G
17 POWDER, FOR SOLUTION ORAL DAILY
Status: DISCONTINUED | OUTPATIENT
Start: 2020-04-02 | End: 2020-04-03 | Stop reason: HOSPADM

## 2020-04-02 RX ORDER — LISINOPRIL 10 MG/1
10 TABLET ORAL DAILY
Status: ON HOLD | COMMUNITY
End: 2020-04-03 | Stop reason: SDUPTHER

## 2020-04-02 RX ORDER — FUROSEMIDE 10 MG/ML
20 INJECTION INTRAMUSCULAR; INTRAVENOUS ONCE
Status: COMPLETED | OUTPATIENT
Start: 2020-04-02 | End: 2020-04-02

## 2020-04-02 RX ADMIN — FAMOTIDINE 20 MG: 20 TABLET, FILM COATED ORAL at 20:45

## 2020-04-02 RX ADMIN — SUCRALFATE 1 G: 1 SUSPENSION ORAL at 11:20

## 2020-04-02 RX ADMIN — LISINOPRIL 20 MG: 20 TABLET ORAL at 09:11

## 2020-04-02 RX ADMIN — HYDROCODONE BITARTRATE AND ACETAMINOPHEN 1 TABLET: 5; 325 TABLET ORAL at 06:23

## 2020-04-02 RX ADMIN — METOPROLOL SUCCINATE 25 MG: 25 TABLET, EXTENDED RELEASE ORAL at 09:10

## 2020-04-02 RX ADMIN — METRONIDAZOLE 500 MG: 500 INJECTION, SOLUTION INTRAVENOUS at 20:44

## 2020-04-02 RX ADMIN — DONEPEZIL HYDROCHLORIDE 10 MG: 10 TABLET, FILM COATED ORAL at 20:44

## 2020-04-02 RX ADMIN — DOCUSATE SODIUM 50 MG AND SENNOSIDES 8.6 MG 1 TABLET: 8.6; 5 TABLET, FILM COATED ORAL at 10:51

## 2020-04-02 RX ADMIN — FAMOTIDINE 20 MG: 20 TABLET, FILM COATED ORAL at 09:11

## 2020-04-02 RX ADMIN — METRONIDAZOLE 500 MG: 500 INJECTION, SOLUTION INTRAVENOUS at 04:21

## 2020-04-02 RX ADMIN — SUCRALFATE 1 G: 1 SUSPENSION ORAL at 06:00

## 2020-04-02 RX ADMIN — AMLODIPINE BESYLATE 5 MG: 5 TABLET ORAL at 09:11

## 2020-04-02 RX ADMIN — METRONIDAZOLE 500 MG: 500 INJECTION, SOLUTION INTRAVENOUS at 11:20

## 2020-04-02 RX ADMIN — POLYETHYLENE GLYCOL 3350 17 G: 17 POWDER, FOR SOLUTION ORAL at 16:23

## 2020-04-02 RX ADMIN — SUCRALFATE 1 G: 1 SUSPENSION ORAL at 17:32

## 2020-04-02 RX ADMIN — LEVOFLOXACIN 250 MG: 5 INJECTION, SOLUTION INTRAVENOUS at 16:23

## 2020-04-02 RX ADMIN — HYDROCODONE BITARTRATE AND ACETAMINOPHEN 1 TABLET: 5; 325 TABLET ORAL at 17:31

## 2020-04-02 RX ADMIN — HYDROCODONE BITARTRATE AND ACETAMINOPHEN 1 TABLET: 5; 325 TABLET ORAL at 10:51

## 2020-04-02 RX ADMIN — FUROSEMIDE 20 MG: 10 INJECTION, SOLUTION INTRAMUSCULAR; INTRAVENOUS at 09:09

## 2020-04-03 VITALS
BODY MASS INDEX: 25.7 KG/M2 | DIASTOLIC BLOOD PRESSURE: 87 MMHG | SYSTOLIC BLOOD PRESSURE: 161 MMHG | RESPIRATION RATE: 16 BRPM | HEART RATE: 76 BPM | OXYGEN SATURATION: 96 % | HEIGHT: 68 IN | WEIGHT: 169.6 LBS | TEMPERATURE: 97.9 F

## 2020-04-03 LAB
ALBUMIN SERPL-MCNC: 3.8 G/DL (ref 3.5–5.2)
ALBUMIN/GLOB SERPL: 1.4 G/DL
ALP SERPL-CCNC: 134 U/L (ref 39–117)
ALT SERPL W P-5'-P-CCNC: 256 U/L (ref 1–33)
ANION GAP SERPL CALCULATED.3IONS-SCNC: 14 MMOL/L (ref 5–15)
AST SERPL-CCNC: 63 U/L (ref 1–32)
BILIRUB SERPL-MCNC: 0.7 MG/DL (ref 0.2–1.2)
BUN BLD-MCNC: 10 MG/DL (ref 8–23)
BUN/CREAT SERPL: 18.9 (ref 7–25)
CALCIUM SPEC-SCNC: 9 MG/DL (ref 8.2–9.6)
CHLORIDE SERPL-SCNC: 97 MMOL/L (ref 98–107)
CO2 SERPL-SCNC: 26 MMOL/L (ref 22–29)
CREAT BLD-MCNC: 0.53 MG/DL (ref 0.57–1)
DEPRECATED RDW RBC AUTO: 45.8 FL (ref 37–54)
ERYTHROCYTE [DISTWIDTH] IN BLOOD BY AUTOMATED COUNT: 14.6 % (ref 12.3–15.4)
GFR SERPL CREATININE-BSD FRML MDRD: 108 ML/MIN/1.73
GLOBULIN UR ELPH-MCNC: 2.8 GM/DL
GLUCOSE BLD-MCNC: 157 MG/DL (ref 65–99)
GLUCOSE BLDC GLUCOMTR-MCNC: 112 MG/DL (ref 70–130)
HCT VFR BLD AUTO: 36.7 % (ref 34–46.6)
HGB BLD-MCNC: 12.2 G/DL (ref 12–15.9)
MCH RBC QN AUTO: 28.4 PG (ref 26.6–33)
MCHC RBC AUTO-ENTMCNC: 33.2 G/DL (ref 31.5–35.7)
MCV RBC AUTO: 85.3 FL (ref 79–97)
PLATELET # BLD AUTO: 226 10*3/MM3 (ref 140–450)
PMV BLD AUTO: 10.1 FL (ref 6–12)
POTASSIUM BLD-SCNC: 3 MMOL/L (ref 3.5–5.2)
PROT SERPL-MCNC: 6.6 G/DL (ref 6–8.5)
RBC # BLD AUTO: 4.3 10*6/MM3 (ref 3.77–5.28)
SODIUM BLD-SCNC: 137 MMOL/L (ref 136–145)
WBC NRBC COR # BLD: 7.54 10*3/MM3 (ref 3.4–10.8)

## 2020-04-03 PROCEDURE — 85027 COMPLETE CBC AUTOMATED: CPT | Performed by: SPECIALIST

## 2020-04-03 PROCEDURE — 80053 COMPREHEN METABOLIC PANEL: CPT | Performed by: SPECIALIST

## 2020-04-03 PROCEDURE — 82962 GLUCOSE BLOOD TEST: CPT

## 2020-04-03 RX ORDER — METRONIDAZOLE 500 MG/1
500 TABLET ORAL 3 TIMES DAILY
Qty: 9 TABLET | Refills: 0 | Status: SHIPPED | OUTPATIENT
Start: 2020-04-03 | End: 2020-04-06

## 2020-04-03 RX ORDER — AMLODIPINE BESYLATE 5 MG/1
5 TABLET ORAL DAILY
Qty: 30 TABLET | Refills: 2 | Status: ON HOLD | OUTPATIENT
Start: 2020-04-03 | End: 2023-03-05

## 2020-04-03 RX ORDER — ACETAMINOPHEN 325 MG/1
650 TABLET ORAL EVERY 6 HOURS PRN
Status: ON HOLD
Start: 2020-04-03 | End: 2023-03-06

## 2020-04-03 RX ORDER — LISINOPRIL 20 MG/1
20 TABLET ORAL DAILY
Qty: 30 TABLET | Refills: 2 | Status: SHIPPED | OUTPATIENT
Start: 2020-04-03 | End: 2021-02-19

## 2020-04-03 RX ORDER — AMOXICILLIN 250 MG
1 CAPSULE ORAL DAILY PRN
Start: 2020-04-03 | End: 2021-02-19

## 2020-04-03 RX ORDER — ONDANSETRON 4 MG/1
4 TABLET, FILM COATED ORAL EVERY 6 HOURS PRN
Qty: 30 TABLET | Refills: 0 | Status: SHIPPED | OUTPATIENT
Start: 2020-04-03 | End: 2021-02-19

## 2020-04-03 RX ORDER — BISACODYL 10 MG
10 SUPPOSITORY, RECTAL RECTAL DAILY PRN
Status: DISCONTINUED | OUTPATIENT
Start: 2020-04-03 | End: 2020-04-03 | Stop reason: HOSPADM

## 2020-04-03 RX ORDER — LEVOFLOXACIN 250 MG/1
250 TABLET ORAL DAILY
Qty: 4 TABLET | Refills: 0 | Status: SHIPPED | OUTPATIENT
Start: 2020-04-03 | End: 2020-04-07

## 2020-04-03 RX ORDER — POTASSIUM CHLORIDE 750 MG/1
40 CAPSULE, EXTENDED RELEASE ORAL EVERY 4 HOURS
Status: DISCONTINUED | OUTPATIENT
Start: 2020-04-03 | End: 2020-04-03 | Stop reason: HOSPADM

## 2020-04-03 RX ADMIN — METRONIDAZOLE 500 MG: 500 INJECTION, SOLUTION INTRAVENOUS at 05:02

## 2020-04-03 RX ADMIN — FAMOTIDINE 20 MG: 20 TABLET, FILM COATED ORAL at 08:35

## 2020-04-03 RX ADMIN — POTASSIUM CHLORIDE 40 MEQ: 750 CAPSULE, EXTENDED RELEASE ORAL at 08:34

## 2020-04-03 RX ADMIN — AMLODIPINE BESYLATE 5 MG: 5 TABLET ORAL at 08:35

## 2020-04-03 RX ADMIN — SUCRALFATE 1 G: 1 SUSPENSION ORAL at 05:02

## 2020-04-03 RX ADMIN — POLYETHYLENE GLYCOL 3350 17 G: 17 POWDER, FOR SOLUTION ORAL at 08:35

## 2020-04-03 RX ADMIN — MAGNESIUM HYDROXIDE 10 ML: 2400 SUSPENSION ORAL at 08:35

## 2020-04-03 RX ADMIN — METOPROLOL SUCCINATE 25 MG: 25 TABLET, EXTENDED RELEASE ORAL at 08:35

## 2020-04-03 RX ADMIN — DOCUSATE SODIUM 50 MG AND SENNOSIDES 8.6 MG 1 TABLET: 8.6; 5 TABLET, FILM COATED ORAL at 08:35

## 2020-04-03 RX ADMIN — LISINOPRIL 20 MG: 20 TABLET ORAL at 08:35

## 2020-04-04 ENCOUNTER — READMISSION MANAGEMENT (OUTPATIENT)
Dept: CALL CENTER | Facility: HOSPITAL | Age: 85
End: 2020-04-04

## 2020-04-04 NOTE — OUTREACH NOTE
Prep Survey      Responses   Gnosticism facility patient discharged from?  [facility]   Is LACE score < 7 ?  No   Eligibility  Readm Mgmt   Discharge diagnosis  Acute pancreatitis   Does the patient have one of the following disease processes/diagnoses(primary or secondary)?  General Surgery   Does the patient have Home health ordered?  Yes   What is the Home health agency?   [facility]    Is there a DME ordered?  No   Prep survey completed?  Yes          Karla iVllanueva RN

## 2020-04-06 ENCOUNTER — READMISSION MANAGEMENT (OUTPATIENT)
Dept: CALL CENTER | Facility: HOSPITAL | Age: 85
End: 2020-04-06

## 2020-04-06 NOTE — OUTREACH NOTE
General Surgery Week 1 Survey      Responses   [facility] patient discharged from?  [place]   Does the patient have one of the following disease processes/diagnoses(primary or secondary)?  General Surgery   Is there a successful TCM telephone encounter documented?  No   Week 1 attempt successful?  Yes   Call start time  1737   Call end time  1741   Discharge diagnosis  Acute pancreatitis   Meds reviewed with patient/caregiver?  Yes   Is the patient having any side effects they believe may be caused by any medication additions or changes?  No   Does the patient have all medications related to this admission filled (includes all antibiotics, pain medications, etc.)  Yes   Is the patient taking all medications as directed (includes completed medication regime)?  Yes   Does the patient have a follow up appointment scheduled with their surgeon?  Yes   Has the patient kept scheduled appointments due by today?  Yes   What is the Home health agency?   [facility]    Psychosocial issues?  No   Did the patient receive a copy of their discharge instructions?  Yes   Nursing interventions  Reviewed instructions with patient   What is the patient's perception of their health status since discharge?  Improving   Nursing interventions  Nurse provided patient education   Is the patient /caregiver able to teach back basic post-op care?  Take showers only when approved by MD-sponge bathe until then, No tub bath, swimming, or hot tub until instructed by MD, Lifting as instructed by MD in discharge instructions   Is the patient/caregiver able to teach back signs and symptoms of incisional infection?  Increased redness, swelling or pain at the incisonal site, Increased drainage or bleeding, Fever   Is the patient/caregiver able to teach back steps to recovery at home?  Set small, achievable goals for return to baseline health, Rest and rebuild strength, gradually increase activity   Is the patient/caregiver able to teach back the hierarchy  of who to call/visit for symptoms/problems? PCP, Specialist, Home health nurse, Urgent Care, ED, 911  Yes   Week 1 call completed?  Yes          Js Cisse RN

## 2020-04-13 ENCOUNTER — READMISSION MANAGEMENT (OUTPATIENT)
Dept: CALL CENTER | Facility: HOSPITAL | Age: 85
End: 2020-04-13

## 2020-04-13 NOTE — OUTREACH NOTE
General Surgery Week 2 Survey      Responses   [facility] patient discharged from?  [place]   Does the patient have one of the following disease processes/diagnoses(primary or secondary)?  General Surgery   Week 2 attempt successful?  No   Unsuccessful attempts  Attempt 1          Carlos Morin RN

## 2020-04-14 ENCOUNTER — READMISSION MANAGEMENT (OUTPATIENT)
Dept: CALL CENTER | Facility: HOSPITAL | Age: 85
End: 2020-04-14

## 2020-04-14 NOTE — OUTREACH NOTE
General Surgery Week 2 Survey      Responses   [facility] patient discharged from?  [place]   Does the patient have one of the following disease processes/diagnoses(primary or secondary)?  General Surgery   Week 2 attempt successful?  No   Unsuccessful attempts  Attempt 2          Hailey Vega LPN

## 2020-04-17 ENCOUNTER — TELEPHONE (OUTPATIENT)
Dept: OBGYN | Age: 85
End: 2020-04-17

## 2020-04-17 RX ORDER — ESTRADIOL 10 UG/1
INSERT VAGINAL
Qty: 8 TABLET | Refills: 11 | Status: SHIPPED | OUTPATIENT
Start: 2020-04-17 | End: 2021-03-12

## 2020-04-17 RX ORDER — CONJUGATED ESTROGENS 0.62 MG/G
0.5 CREAM VAGINAL
Qty: 1 TUBE | Refills: 3 | Status: SHIPPED | OUTPATIENT
Start: 2020-04-20 | End: 2021-06-28

## 2020-04-21 ENCOUNTER — READMISSION MANAGEMENT (OUTPATIENT)
Dept: CALL CENTER | Facility: HOSPITAL | Age: 85
End: 2020-04-21

## 2020-04-21 NOTE — OUTREACH NOTE
General Surgery Week 3 Survey      Responses   [facility] patient discharged from?  [place]   Does the patient have one of the following disease processes/diagnoses(primary or secondary)?  General Surgery   Week 3 attempt successful?  Yes   Call start time  1810   Call end time  1818   Discharge diagnosis  Acute pancreatitis   Meds reviewed with patient/caregiver?  Yes   Is the patient having any side effects they believe may be caused by any medication additions or changes?  Yes   Side effects comments   Metformin dose d/c'd and BP med dose d/c'd   Does the patient have all medications related to this admission filled (includes all antibiotics, pain medications, etc.)  Yes   Is the patient taking all medications as directed (includes completed medication regime)?  Yes   Does the patient have a follow up appointment scheduled with their surgeon?  Yes   Has the patient kept scheduled appointments due by today?  Yes   What is the Home health agency?   [facility]    Has home health visited the patient within 72 hours of discharge?  Yes   Psychosocial issues?  No   Did the patient receive a copy of their discharge instructions?  Yes   Nursing interventions  Reviewed instructions with patient   What is the patient's perception of their health status since discharge?  Improving   Nursing interventions  Nurse provided patient education   Is the patient /caregiver able to teach back basic post-op care?  Keep incision areas clean,dry and protected   Is the patient/caregiver able to teach back signs and symptoms of incisional infection?  Increased redness, swelling or pain at the incisonal site, Increased drainage or bleeding, Incisional warmth, Pus or odor from incision, Fever   Is the patient/caregiver able to teach back steps to recovery at home?  Set small, achievable goals for return to baseline health, Rest and rebuild strength, gradually increase activity   Is the patient/caregiver able to teach back the hierarchy of  who to call/visit for symptoms/problems? PCP, Specialist, Home health nurse, Urgent Care, ED, 911  Yes   Additional teach back comments  states feeling well, has been working on gaining weight, drinks adequate fluids, able to work in yard   Week 3 call completed?  Yes          Gina Cadena RN

## 2020-04-30 ENCOUNTER — READMISSION MANAGEMENT (OUTPATIENT)
Dept: CALL CENTER | Facility: HOSPITAL | Age: 85
End: 2020-04-30

## 2020-04-30 NOTE — OUTREACH NOTE
General Surgery Week 4 Survey      Responses   [facility] patient discharged from?  [place]   Does the patient have one of the following disease processes/diagnoses(primary or secondary)?  General Surgery   Week 4 attempt successful?  No          Anabella Fitzgerald RN

## 2020-10-22 ENCOUNTER — OFFICE VISIT (OUTPATIENT)
Age: 85
End: 2020-10-22

## 2020-10-22 VITALS — TEMPERATURE: 97 F | OXYGEN SATURATION: 97 % | HEART RATE: 64 BPM

## 2020-10-22 PROCEDURE — 99999 PR OFFICE/OUTPT VISIT,PROCEDURE ONLY: CPT | Performed by: NURSE PRACTITIONER

## 2020-10-24 LAB — SARS-COV-2, NAA: NOT DETECTED

## 2021-01-20 ENCOUNTER — IMMUNIZATION (OUTPATIENT)
Age: 86
End: 2021-01-20
Payer: MEDICARE

## 2021-01-20 PROCEDURE — 91300 COVID-19, PFIZER VACCINE 30MCG/0.3ML DOSE: CPT | Performed by: FAMILY MEDICINE

## 2021-01-20 PROCEDURE — 0001A COVID-19, PFIZER VACCINE 30MCG/0.3ML DOSE: CPT | Performed by: FAMILY MEDICINE

## 2021-02-19 ENCOUNTER — OFFICE VISIT (OUTPATIENT)
Dept: GASTROENTEROLOGY | Facility: CLINIC | Age: 86
End: 2021-02-19

## 2021-02-19 ENCOUNTER — LAB (OUTPATIENT)
Dept: LAB | Facility: HOSPITAL | Age: 86
End: 2021-02-19

## 2021-02-19 VITALS
TEMPERATURE: 97.5 F | OXYGEN SATURATION: 97 % | BODY MASS INDEX: 21.98 KG/M2 | DIASTOLIC BLOOD PRESSURE: 70 MMHG | SYSTOLIC BLOOD PRESSURE: 120 MMHG | WEIGHT: 145 LBS | HEIGHT: 68 IN | HEART RATE: 79 BPM

## 2021-02-19 DIAGNOSIS — R19.7 DIARRHEA OF PRESUMED INFECTIOUS ORIGIN: ICD-10-CM

## 2021-02-19 DIAGNOSIS — E66.9 OBESITY, UNSPECIFIED OBESITY SEVERITY, UNSPECIFIED OBESITY TYPE: ICD-10-CM

## 2021-02-19 DIAGNOSIS — Z78.9 NONSMOKER: ICD-10-CM

## 2021-02-19 DIAGNOSIS — R19.7 DIARRHEA OF PRESUMED INFECTIOUS ORIGIN: Primary | ICD-10-CM

## 2021-02-19 LAB
ALBUMIN SERPL-MCNC: 4.5 G/DL (ref 3.5–5.2)
ALBUMIN/GLOB SERPL: 1.8 G/DL
ALP SERPL-CCNC: 74 U/L (ref 39–117)
ALT SERPL W P-5'-P-CCNC: 20 U/L (ref 1–33)
ANION GAP SERPL CALCULATED.3IONS-SCNC: 9.7 MMOL/L (ref 5–15)
AST SERPL-CCNC: 22 U/L (ref 1–32)
BASOPHILS # BLD AUTO: 0.05 10*3/MM3 (ref 0–0.2)
BASOPHILS NFR BLD AUTO: 0.8 % (ref 0–1.5)
BILIRUB SERPL-MCNC: 0.3 MG/DL (ref 0–1.2)
BUN SERPL-MCNC: 10 MG/DL (ref 8–23)
BUN/CREAT SERPL: 13.3 (ref 7–25)
CALCIUM SPEC-SCNC: 9.8 MG/DL (ref 8.2–9.6)
CHLORIDE SERPL-SCNC: 100 MMOL/L (ref 98–107)
CO2 SERPL-SCNC: 29.3 MMOL/L (ref 22–29)
CREAT SERPL-MCNC: 0.75 MG/DL (ref 0.57–1)
CRP SERPL-MCNC: 2.03 MG/DL (ref 0–0.5)
DEPRECATED RDW RBC AUTO: 41.8 FL (ref 37–54)
EOSINOPHIL # BLD AUTO: 0.17 10*3/MM3 (ref 0–0.4)
EOSINOPHIL NFR BLD AUTO: 2.7 % (ref 0.3–6.2)
ERYTHROCYTE [DISTWIDTH] IN BLOOD BY AUTOMATED COUNT: 13.1 % (ref 12.3–15.4)
ERYTHROCYTE [SEDIMENTATION RATE] IN BLOOD: 20 MM/HR (ref 0–30)
GFR SERPL CREATININE-BSD FRML MDRD: 72 ML/MIN/1.73
GLOBULIN UR ELPH-MCNC: 2.5 GM/DL
GLUCOSE SERPL-MCNC: 112 MG/DL (ref 65–99)
HCT VFR BLD AUTO: 39.9 % (ref 34–46.6)
HGB BLD-MCNC: 13.5 G/DL (ref 12–15.9)
IMM GRANULOCYTES # BLD AUTO: 0.01 10*3/MM3 (ref 0–0.05)
IMM GRANULOCYTES NFR BLD AUTO: 0.2 % (ref 0–0.5)
LYMPHOCYTES # BLD AUTO: 1.25 10*3/MM3 (ref 0.7–3.1)
LYMPHOCYTES NFR BLD AUTO: 19.7 % (ref 19.6–45.3)
MCH RBC QN AUTO: 30 PG (ref 26.6–33)
MCHC RBC AUTO-ENTMCNC: 33.8 G/DL (ref 31.5–35.7)
MCV RBC AUTO: 88.7 FL (ref 79–97)
MONOCYTES # BLD AUTO: 0.67 10*3/MM3 (ref 0.1–0.9)
MONOCYTES NFR BLD AUTO: 10.5 % (ref 5–12)
NEUTROPHILS NFR BLD AUTO: 4.21 10*3/MM3 (ref 1.7–7)
NEUTROPHILS NFR BLD AUTO: 66.1 % (ref 42.7–76)
NRBC BLD AUTO-RTO: 0 /100 WBC (ref 0–0.2)
PLATELET # BLD AUTO: 263 10*3/MM3 (ref 140–450)
PMV BLD AUTO: 10.2 FL (ref 6–12)
POTASSIUM SERPL-SCNC: 3.9 MMOL/L (ref 3.5–5.2)
PROT SERPL-MCNC: 7 G/DL (ref 6–8.5)
RBC # BLD AUTO: 4.5 10*6/MM3 (ref 3.77–5.28)
SODIUM SERPL-SCNC: 139 MMOL/L (ref 136–145)
WBC # BLD AUTO: 6.36 10*3/MM3 (ref 3.4–10.8)

## 2021-02-19 PROCEDURE — 85025 COMPLETE CBC W/AUTO DIFF WBC: CPT | Performed by: CLINICAL NURSE SPECIALIST

## 2021-02-19 PROCEDURE — 85652 RBC SED RATE AUTOMATED: CPT

## 2021-02-19 PROCEDURE — 86140 C-REACTIVE PROTEIN: CPT

## 2021-02-19 PROCEDURE — 80053 COMPREHEN METABOLIC PANEL: CPT | Performed by: CLINICAL NURSE SPECIALIST

## 2021-02-19 PROCEDURE — 36415 COLL VENOUS BLD VENIPUNCTURE: CPT

## 2021-02-19 PROCEDURE — 99213 OFFICE O/P EST LOW 20 MIN: CPT | Performed by: CLINICAL NURSE SPECIALIST

## 2021-02-19 NOTE — PROGRESS NOTES
Hannah Dasilva  11/27/1929 2/19/2021  Chief Complaint   Patient presents with   • GI Problem     Diarrhea     Subjective   HPI  Hannah Dasilva is a 89 y.o. female who presents with a complaint of increase in bowel movements up to 4 times per day that began last week. They were watery stools. They had not been formed for a week. Today she has not had a BM. She had not been on prior antibiotics. No bleeding. No fever. No abdominal pain. No cramps.  She is no longer on her Metformin. No new medications.      Past Medical History:   Diagnosis Date   • Diabetes mellitus (CMS/HCC)    • Disc degeneration, lumbar    • History of adenomatous polyp of colon    • Hypercholesterolemia    • Irregular heartbeat    • LAFB (left anterior fascicular block) 10/26/2018   • MVP (mitral valve prolapse)    • Osteoarthritis    • Sciatica      Past Surgical History:   Procedure Laterality Date   • APPENDECTOMY     • BLADDER REPAIR     • CHOLECYSTECTOMY WITH INTRAOPERATIVE CHOLANGIOGRAM N/A 3/31/2020    Procedure: CHOLECYSTECTOMY LAPAROSCOPIC INTRAOPERATIVE CHOLANGIOGRAM;  Surgeon: Jenna Kinney MD;  Location: [facility];  Service: General;  Laterality: N/A;   • COLONOSCOPY  02/23/2010    diverticulosis   • COLONOSCOPY W/ POLYPECTOMY  11/06/2006    ASCENDING COLON ADENOMATOUS POLYP   • ENDOSCOPY  10/04/2006    SBBX UNREMARKABLE, SMALL HH, UREA NEG   • HYSTERECTOMY     • TONSILLECTOMY         Outpatient Medications Marked as Taking for the 2/19/21 encounter (Office Visit) with Aga Hendrickson APRN   Medication Sig Dispense Refill   • acetaminophen (Tylenol) 325 MG tablet Take 2 tablets by mouth Every 6 (Six) Hours As Needed for Mild Pain .     • amLODIPine (NORVASC) 5 MG tablet Take 1 tablet by mouth Daily. 30 tablet 2   • BIOTIN 5000 PO Take 1 tablet by mouth Daily.     • Calcium Carbonate-Vit D-Min (CALTRATE 600+D PLUS PO) Take 1 tablet by mouth Daily.     • Cholecalciferol (VITAMIN D3) 5000 units capsule capsule Take  5,000 Units by mouth Daily.     • donepezil (ARICEPT) 10 MG tablet Take 10 mg by mouth Every Night.     • estradiol (VAGIFEM) 10 MCG tablet vaginal tablet Insert 1 tablet into the vagina 2 (Two) Times a Week.     • Multiple Vitamins-Minerals (MULTIPLE VITAMINS/WOMENS PO) Take 1 tablet by mouth Daily.     • pregabalin (LYRICA) 75 MG capsule Take 75 mg by mouth 4 (Four) Times a Day.     • Psyllium (METAMUCIL FIBER PO) Take 1 package by mouth Daily As Needed (constipation).     • zolpidem (Ambien) 10 MG tablet Take 10 mg by mouth At Night As Needed for Sleep. PT takes 5-10 mg at night       Allergies   Allergen Reactions   • Penicillins Nausea And Vomiting   • Quinine Derivatives Nausea And Vomiting   • Tizanidine Other (See Comments)     Weakness, fever   • Sulfa Antibiotics Rash     Social History     Socioeconomic History   • Marital status:      Spouse name: Not on file   • Number of children: Not on file   • Years of education: Not on file   • Highest education level: Not on file   Tobacco Use   • Smoking status: Never Smoker   • Smokeless tobacco: Never Used   Substance and Sexual Activity   • Alcohol use: Yes     Comment: wine occasional   • Drug use: No     Family History   Problem Relation Age of Onset   • Colon polyps Son    • Stroke Mother    • Heart attack Father    • GI problems Neg Hx    • Colon cancer Neg Hx      Health Maintenance   Topic Date Due   • URINE MICROALBUMIN  11/27/1929   • DXA SCAN  11/27/1929   • TDAP/TD VACCINES (1 - Tdap) [birth year]/27/1948   • ZOSTER VACCINE (1 of 2) 11/27/1979   • Pneumococcal Vaccine 65+ (1 of 1 - PPSV23) 11/27/1994   • ANNUAL WELLNESS VISIT  05/09/2020   • INFLUENZA VACCINE  08/01/2020   • HEMOGLOBIN A1C  09/30/2020   • DIABETIC EYE EXAM  09/30/2020   • LIPID PANEL  03/31/2021   • MENINGOCOCCAL VACCINE  Aged Out     Review of Systems   Constitutional: Negative for activity change, appetite change, chills, diaphoresis, fatigue, fever and unexpected weight change.  "  HENT: Negative for ear pain, hearing loss, mouth sores, sore throat, trouble swallowing and voice change.    Eyes: Negative.    Respiratory: Negative for cough, choking, shortness of breath and wheezing.    Cardiovascular: Negative for chest pain and palpitations.   Gastrointestinal: Positive for diarrhea. Negative for abdominal pain, blood in stool, constipation, nausea and vomiting.   Endocrine: Negative for cold intolerance and heat intolerance.   Genitourinary: Negative for decreased urine volume, dysuria, frequency, hematuria and urgency.   Musculoskeletal: Negative for back pain, gait problem and myalgias.   Skin: Negative for color change, pallor and rash.   Allergic/Immunologic: Negative for food allergies and immunocompromised state.   Neurological: Negative for dizziness, tremors, seizures, syncope, weakness, light-headedness, numbness and headaches.   Hematological: Negative for adenopathy. Does not bruise/bleed easily.   Psychiatric/Behavioral: Negative for agitation and confusion. The patient is not nervous/anxious.    All other systems reviewed and are negative.    Objective   Vitals:    02/19/21 0838   BP: 120/70   Pulse: 79   Temp: 97.5 °F (36.4 °C)   SpO2: 97%   Weight: 65.8 kg (145 lb)   Height: 172.7 cm (68\")     Body mass index is 22.05 kg/m².  Physical Exam  Constitutional:       Appearance: She is well-developed.   HENT:      Head: Normocephalic and atraumatic.   Eyes:      Pupils: Pupils are equal, round, and reactive to light.   Neck:      Musculoskeletal: Normal range of motion and neck supple.      Trachea: No tracheal deviation.   Cardiovascular:      Rate and Rhythm: Normal rate and regular rhythm.      Heart sounds: Normal heart sounds. No murmur. No friction rub. No gallop.    Pulmonary:      Effort: Pulmonary effort is normal. No respiratory distress.      Breath sounds: Normal breath sounds. No wheezing or rales.   Chest:      Chest wall: No tenderness.   Abdominal:      General: " Bowel sounds are normal. There is no distension.      Palpations: Abdomen is soft. Abdomen is not rigid.      Tenderness: There is no abdominal tenderness. There is no guarding or rebound.   Musculoskeletal: Normal range of motion.         General: No tenderness or deformity.   Skin:     General: Skin is warm and dry.      Coloration: Skin is not pale.      Findings: No rash.   Neurological:      Mental Status: She is alert and oriented to person, place, and time.      Deep Tendon Reflexes: Reflexes are normal and symmetric.   Psychiatric:         Behavior: Behavior normal.         Thought Content: Thought content normal.         Judgment: Judgment normal.       Assessment/Plan   Diagnoses and all orders for this visit:    1. Diarrhea of presumed infectious origin (Primary)  -     Gastrointestinal Panel, PCR - Stool, Per Rectum  -     Clostridium Difficile Toxin - Stool, Per Rectum  -     Fecal Lactoferrin - Stool, Per Rectum; Future  -     CBC & Differential  -     Comprehensive Metabolic Panel  -     Sedimentation Rate; Future  -     C-reactive Protein; Future    2. Nonsmoker    3. Obesity, unspecified obesity severity, unspecified obesity type    I suspect that she has had a diarrheal illness and I have talked to her at length about this. She is 89 and does not desire a colonoscopy which I agree. She says that she is turning the corner at this point.   I have suggested fiber as well such as Citrucel tablet or metamucil may help bulk stool and help with regularity.   No new medication  No recent antibiotics  No dairy and avoid artificial sweeteners    Part of this note may be an electronic transcription/translation of spoken language to printed text using the Dragon Dictation System.  Body mass index is 22.05 kg/m².  No follow-ups on file.    Patient's Body mass index is 22.05 kg/m². BMI is within normal parameters. No follow-up required..      All risks, benefits, alternatives, and indications of colonoscopy  and/or Endoscopy procedure have been discussed with the patient. Risks to include perforation of the colon requiring possible surgery or colostomy, risk of bleeding from biopsies or removal of colon tissue, possibility of missing a colon polyp or cancer, or adverse drug reaction.  Benefits to include the diagnosis and management of disease of the colon and rectum. Alternatives to include barium enema, radiographic evaluation, lab testing or no intervention. Pt verbalizes understanding and agrees.     Aga Caryl Hendrickson, APRN  2/19/2021  09:08 CST      Obesity, Adult  Obesity is the condition of having too much total body fat. Being overweight or obese means that your weight is greater than what is considered healthy for your body size. Obesity is determined by a measurement called BMI. BMI is an estimate of body fat and is calculated from height and weight. For adults, a BMI of 30 or higher is considered obese.  Obesity can eventually lead to other health concerns and major illnesses, including:  · Stroke.  · Coronary artery disease (CAD).  · Type 2 diabetes.  · Some types of cancer, including cancers of the colon, breast, uterus, and gallbladder.  · Osteoarthritis.  · High blood pressure (hypertension).  · High cholesterol.  · Sleep apnea.  · Gallbladder stones.  · Infertility problems.  What are the causes?  The main cause of obesity is taking in (consuming) more calories than your body uses for energy. Other factors that contribute to this condition may include:  · Being born with genes that make you more likely to become obese.  · Having a medical condition that causes obesity. These conditions include:  ¨ Hypothyroidism.  ¨ Polycystic ovarian syndrome (PCOS).  ¨ Binge-eating disorder.  ¨ Cushing syndrome.  · Taking certain medicines, such as steroids, antidepressants, and seizure medicines.  · Not being physically active (sedentary lifestyle).  · Living where there are limited places to exercise safely or buy  healthy foods.  · Not getting enough sleep.  What increases the risk?  The following factors may increase your risk of this condition:  · Having a family history of obesity.  · Being a woman of -American descent.  · Being a man of  descent.  What are the signs or symptoms?  Having excessive body fat is the main symptom of this condition.  How is this diagnosed?  This condition may be diagnosed based on:  · Your symptoms.  · Your medical history.  · A physical exam. Your health care provider may measure:  ¨ Your BMI. If you are an adult with a BMI between 25 and less than 30, you are considered overweight. If you are an adult with a BMI of 30 or higher, you are considered obese.  ¨ The distances around your hips and your waist (circumferences). These may be compared to each other to help diagnose your condition.  ¨ Your skinfold thickness. Your health care provider may gently pinch a fold of your skin and measure it.  How is this treated?  Treatment for this condition often includes changing your lifestyle. Treatment may include some or all of the following:  · Dietary changes. Work with your health care provider and a dietitian to set a weight-loss goal that is healthy and reasonable for you. Dietary changes may include eating:  ¨ Smaller portions. A portion size is the amount of a particular food that is healthy for you to eat at one time. This varies from person to person.  ¨ Low-calorie or low-fat options.  ¨ More whole grains, fruits, and vegetables.  · Regular physical activity. This may include aerobic activity (cardio) and strength training.  · Medicine to help you lose weight. Your health care provider may prescribe medicine if you are unable to lose 1 pound a week after 6 weeks of eating more healthily and doing more physical activity.  · Surgery. Surgical options may include gastric banding and gastric bypass. Surgery may be done if:  ¨ Other treatments have not helped to improve your  condition.  ¨ You have a BMI of 40 or higher.  ¨ You have life-threatening health problems related to obesity.  Follow these instructions at home:     Eating and drinking     · Follow recommendations from your health care provider about what you eat and drink. Your health care provider may advise you to:  ¨ Limit fast foods, sweets, and processed snack foods.  ¨ Choose low-fat options, such as low-fat milk instead of whole milk.  ¨ Eat 5 or more servings of fruits or vegetables every day.  ¨ Eat at home more often. This gives you more control over what you eat.  ¨ Choose healthy foods when you eat out.  ¨ Learn what a healthy portion size is.  ¨ Keep low-fat snacks on hand.  ¨ Avoid sugary drinks, such as soda, fruit juice, iced tea sweetened with sugar, and flavored milk.  ¨ Eat a healthy breakfast.  · Drink enough water to keep your urine clear or pale yellow.  · Do not go without eating for long periods of time (do not fast) or follow a fad diet. Fasting and fad diets can be unhealthy and even dangerous.  Physical Activity   · Exercise regularly, as told by your health care provider. Ask your health care provider what types of exercise are safe for you and how often you should exercise.  · Warm up and stretch before being active.  · Cool down and stretch after being active.  · Rest between periods of activity.  Lifestyle   · Limit the time that you spend in front of your TV, computer, or video game system.  · Find ways to reward yourself that do not involve food.  · Limit alcohol intake to no more than 1 drink a day for nonpregnant women and 2 drinks a day for men. One drink equals 12 oz of beer, 5 oz of wine, or 1½ oz of hard liquor.  General instructions   · Keep a weight loss journal to keep track of the food you eat and how much you exercise you get.  · Take over-the-counter and prescription medicines only as told by your health care provider.  · Take vitamins and supplements only as told by your health care  provider.  · Consider joining a support group. Your health care provider may be able to recommend a support group.  · Keep all follow-up visits as told by your health care provider. This is important.  Contact a health care provider if:  · You are unable to meet your weight loss goal after 6 weeks of dietary and lifestyle changes.  This information is not intended to replace advice given to you by your health care provider. Make sure you discuss any questions you have with your health care provider.  Document Released: 01/25/2006 Document Revised: 05/22/2017 Document Reviewed: 10/05/2016  [employer] Interactive Patient Education © 2017 [employer]      If you smoke or use tobacco, 4 minutes reading provided  Steps to Quit Smoking  Smoking tobacco can be harmful to your health and can affect almost every organ in your body. Smoking puts you, and those around you, at risk for developing many serious chronic diseases. Quitting smoking is difficult, but it is one of the best things that you can do for your health. It is never too late to quit.  What are the benefits of quitting smoking?  When you quit smoking, you lower your risk of developing serious diseases and conditions, such as:  · Lung cancer or lung disease, such as COPD.  · Heart disease.  · Stroke.  · Heart attack.  · Infertility.  · Osteoporosis and bone fractures.  Additionally, symptoms such as coughing, wheezing, and shortness of breath may get better when you quit. You may also find that you get sick less often because your body is stronger at fighting off colds and infections. If you are pregnant, quitting smoking can help to reduce your chances of having a baby of low birth weight.  How do I get ready to quit?  When you decide to quit smoking, create a plan to make sure that you are successful. Before you quit:  · Pick a date to quit. Set a date within the next two weeks to give you time to prepare.  · Write down the reasons why you are quitting. Keep  this list in places where you will see it often, such as on your bathroom mirror or in your car or wallet.  · Identify the people, places, things, and activities that make you want to smoke (triggers) and avoid them. Make sure to take these actions:  ¨ Throw away all cigarettes at home, at work, and in your car.  ¨ Throw away smoking accessories, such as ashtrays and lighters.  ¨ Clean your car and make sure to empty the ashtray.  ¨ Clean your home, including curtains and carpets.  · Tell your family, friends, and coworkers that you are quitting. Support from your loved ones can make quitting easier.  · Talk with your health care provider about your options for quitting smoking.  · Find out what treatment options are covered by your health insurance.  What strategies can I use to quit smoking?  Talk with your healthcare provider about different strategies to quit smoking. Some strategies include:  · Quitting smoking altogether instead of gradually lessening how much you smoke over a period of time. Research shows that quitting “cold turkey” is more successful than gradually quitting.  · Attending in-person counseling to help you build problem-solving skills. You are more likely to have success in quitting if you attend several counseling sessions. Even short sessions of 10 minutes can be effective.  · Finding resources and support systems that can help you to quit smoking and remain smoke-free after you quit. These resources are most helpful when you use them often. They can include:  ¨ Online chats with a counselor.  ¨ Telephone quitlines.  ¨ Printed self-help materials.  ¨ Support groups or group counseling.  ¨ Text messaging programs.  ¨ Mobile phone applications.  · Taking medicines to help you quit smoking. (If you are pregnant or breastfeeding, talk with your health care provider first.) Some medicines contain nicotine and some do not. Both types of medicines help with cravings, but the medicines that include  nicotine help to relieve withdrawal symptoms. Your health care provider may recommend:  ¨ Nicotine patches, gum, or lozenges.  ¨ Nicotine inhalers or sprays.  ¨ Non-nicotine medicine that is taken by mouth.  Talk with your health care provider about combining strategies, such as taking medicines while you are also receiving in-person counseling. Using these two strategies together makes you more likely to succeed in quitting than if you used either strategy on its own.  If you are pregnant or breastfeeding, talk with your health care provider about finding counseling or other support strategies to quit smoking. Do not take medicine to help you quit smoking unless told to do so by your health care provider.  What things can I do to make it easier to quit?  Quitting smoking might feel overwhelming at first, but there is a lot that you can do to make it easier. Take these important actions:  · Reach out to your family and friends and ask that they support and encourage you during this time. Call telephone quitlines, reach out to support groups, or work with a counselor for support.  · Ask people who smoke to avoid smoking around you.  · Avoid places that trigger you to smoke, such as bars, parties, or smoke-break areas at work.  · Spend time around people who do not smoke.  · Lessen stress in your life, because stress can be a smoking trigger for some people. To lessen stress, try:  ¨ Exercising regularly.  ¨ Deep-breathing exercises.  ¨ Yoga.  ¨ Meditating.  ¨ Performing a body scan. This involves closing your eyes, scanning your body from head to toe, and noticing which parts of your body are particularly tense. Purposefully relax the muscles in those areas.  · Download or purchase mobile phone or tablet apps (applications) that can help you stick to your quit plan by providing reminders, tips, and encouragement. There are many free apps, such as QuitGuide from the CDC (Centers for Disease Control and Prevention).  You can find other support for quitting smoking (smoking cessation) through smokefree.gov and other websites.  How will I feel when I quit smoking?  Within the first 24 hours of quitting smoking, you may start to feel some withdrawal symptoms. These symptoms are usually most noticeable 2-3 days after quitting, but they usually do not last beyond 2-3 weeks. Changes or symptoms that you might experience include:  · Mood swings.  · Restlessness, anxiety, or irritation.  · Difficulty concentrating.  · Dizziness.  · Strong cravings for sugary foods in addition to nicotine.  · Mild weight gain.  · Constipation.  · Nausea.  · Coughing or a sore throat.  · Changes in how your medicines work in your body.  · A depressed mood.  · Difficulty sleeping (insomnia).  After the first 2-3 weeks of quitting, you may start to notice more positive results, such as:  · Improved sense of smell and taste.  · Decreased coughing and sore throat.  · Slower heart rate.  · Lower blood pressure.  · Clearer skin.  · The ability to breathe more easily.  · Fewer sick days.  Quitting smoking is very challenging for most people. Do not get discouraged if you are not successful the first time. Some people need to make many attempts to quit before they achieve long-term success. Do your best to stick to your quit plan, and talk with your health care provider if you have any questions or concerns.  This information is not intended to replace advice given to you by your health care provider. Make sure you discuss any questions you have with your health care provider.  Document Released: 12/12/2002 Document Revised: 08/15/2017 Document Reviewed: 05/03/2016  [employer] Interactive Patient Education © 2017 [employer]

## 2021-02-22 ENCOUNTER — LAB (OUTPATIENT)
Dept: LAB | Facility: HOSPITAL | Age: 86
End: 2021-02-22

## 2021-02-22 DIAGNOSIS — R19.7 DIARRHEA OF PRESUMED INFECTIOUS ORIGIN: ICD-10-CM

## 2021-02-22 LAB
ADV 40+41 DNA STL QL NAA+NON-PROBE: NOT DETECTED
ASTRO TYP 1-8 RNA STL QL NAA+NON-PROBE: NOT DETECTED
C CAYETANENSIS DNA STL QL NAA+NON-PROBE: NOT DETECTED
C DIFF TOX GENS STL QL NAA+PROBE: NEGATIVE
CAMPY SP DNA.DIARRHEA STL QL NAA+PROBE: NOT DETECTED
CRYPTOSP STL CULT: NOT DETECTED
E HISTOLYT AG STL-ACNC: NOT DETECTED
EAEC PAA PLAS AGGR+AATA ST NAA+NON-PRB: NOT DETECTED
EC STX1 + STX2 GENES STL NAA+PROBE: NOT DETECTED
EPEC EAE GENE STL QL NAA+NON-PROBE: NOT DETECTED
ETEC LTA+ST1A+ST1B TOX ST NAA+NON-PROBE: NOT DETECTED
G LAMBLIA DNA SPEC QL NAA+PROBE: NOT DETECTED
LACTOFERRIN STL QL LA: POSITIVE
NOROVIRUS GI+II RNA STL QL NAA+NON-PROBE: NOT DETECTED
P SHIGELLOIDES DNA STL QL NAA+PROBE: NOT DETECTED
RV RNA STL NAA+PROBE: NOT DETECTED
SALMONELLA DNA SPEC QL NAA+PROBE: NOT DETECTED
SAPO I+II+IV+V RNA STL QL NAA+NON-PROBE: NOT DETECTED
SHIGELLA SP+EIEC IPAH STL QL NAA+PROBE: NOT DETECTED
V CHOLERAE DNA SPEC QL NAA+PROBE: NOT DETECTED
VIBRIO DNA SPEC NAA+PROBE: NOT DETECTED
YERSINIA STL CULT: NOT DETECTED

## 2021-02-22 PROCEDURE — 0097U HC BIOFIRE FILMARRAY GI PANEL: CPT | Performed by: CLINICAL NURSE SPECIALIST

## 2021-02-22 PROCEDURE — 83630 LACTOFERRIN FECAL (QUAL): CPT

## 2021-02-22 PROCEDURE — 87493 C DIFF AMPLIFIED PROBE: CPT | Performed by: CLINICAL NURSE SPECIALIST

## 2021-02-23 ENCOUNTER — TELEPHONE (OUTPATIENT)
Dept: GASTROENTEROLOGY | Facility: CLINIC | Age: 86
End: 2021-02-23

## 2021-03-04 ENCOUNTER — TELEPHONE (OUTPATIENT)
Dept: GASTROENTEROLOGY | Facility: CLINIC | Age: 86
End: 2021-03-04

## 2021-03-04 RX ORDER — METRONIDAZOLE 250 MG/1
250 TABLET ORAL 3 TIMES DAILY
Qty: 30 TABLET | Refills: 0 | Status: SHIPPED | OUTPATIENT
Start: 2021-03-04 | End: 2021-03-14

## 2021-03-04 NOTE — TELEPHONE ENCOUNTER
She says that her stools were 4 times per day and got better then she says that she had bad diarrhea this am. She says that she had a cup of coffee prior then had a normal stool. She has had normal stool cultures and they were negative other that positive lactoferrin. She has made some dietary changes as well.

## 2021-03-04 NOTE — TELEPHONE ENCOUNTER
Ally from [facility] called stating she has talked to patient the past 2 days and that her diarrhea is terrible and not any better requesting that you call patient as soon as possible ?  [phone]

## 2021-03-12 RX ORDER — ESTRADIOL 10 UG/1
INSERT VAGINAL
Qty: 8 TABLET | Refills: 1 | Status: SHIPPED | OUTPATIENT
Start: 2021-03-12 | End: 2021-05-06

## 2021-03-18 ENCOUNTER — OFFICE VISIT (OUTPATIENT)
Dept: GASTROENTEROLOGY | Facility: CLINIC | Age: 86
End: 2021-03-18

## 2021-03-18 VITALS
WEIGHT: 145 LBS | DIASTOLIC BLOOD PRESSURE: 70 MMHG | TEMPERATURE: 97.8 F | OXYGEN SATURATION: 96 % | HEART RATE: 71 BPM | BODY MASS INDEX: 21.98 KG/M2 | SYSTOLIC BLOOD PRESSURE: 130 MMHG | HEIGHT: 68 IN

## 2021-03-18 DIAGNOSIS — Z78.9 NONSMOKER: ICD-10-CM

## 2021-03-18 DIAGNOSIS — R19.7 DIARRHEA OF PRESUMED INFECTIOUS ORIGIN: Primary | ICD-10-CM

## 2021-03-18 PROCEDURE — 99212 OFFICE O/P EST SF 10 MIN: CPT | Performed by: CLINICAL NURSE SPECIALIST

## 2021-03-18 RX ORDER — MULTIVITAMIN WITH IRON
1 TABLET ORAL 2 TIMES DAILY
COMMUNITY

## 2021-03-18 NOTE — PROGRESS NOTES
Hannah Dasilva  11/27/1929      3/18/2021  Chief Complaint   Patient presents with   • GI Problem     Discuss diarrhea         HPI    Hannah Dasilva is a  89 y.o. female here for a follow up visit for complaint of diarrhea. Stool culture were negative other than some WBCs. I treated her with Flagyl and this has seemed to resolve her issues. She is taking fiber and doing well with this. No further diarrhea to the extent that she was having it. She is overall doing much better.   No nausea or vomiting. No wt loss. No fever.     Past Medical History:   Diagnosis Date   • Diabetes mellitus (CMS/HCC)    • Disc degeneration, lumbar    • History of adenomatous polyp of colon    • Hypercholesterolemia    • Irregular heartbeat    • LAFB (left anterior fascicular block) 10/26/2018   • MVP (mitral valve prolapse)    • Osteoarthritis    • Sciatica      Past Surgical History:   Procedure Laterality Date   • APPENDECTOMY     • BLADDER REPAIR     • CHOLECYSTECTOMY WITH INTRAOPERATIVE CHOLANGIOGRAM N/A 3/31/2020    Procedure: CHOLECYSTECTOMY LAPAROSCOPIC INTRAOPERATIVE CHOLANGIOGRAM;  Surgeon: Jenna Kinney MD;  Location: [facility];  Service: General;  Laterality: N/A;   • COLONOSCOPY  02/23/2010    diverticulosis   • COLONOSCOPY W/ POLYPECTOMY  11/06/2006    ASCENDING COLON ADENOMATOUS POLYP   • ENDOSCOPY  10/04/2006    SBBX UNREMARKABLE, SMALL HH, UREA NEG   • HYSTERECTOMY     • TONSILLECTOMY         Outpatient Medications Marked as Taking for the 3/18/21 encounter (Office Visit) with Aga Hendrickson APRN   Medication Sig Dispense Refill   • acetaminophen (Tylenol) 325 MG tablet Take 2 tablets by mouth Every 6 (Six) Hours As Needed for Mild Pain .     • amLODIPine (NORVASC) 5 MG tablet Take 1 tablet by mouth Daily. 30 tablet 2   • B Complex Vitamins (B COMPLEX 50 PO) Take 1 tablet by mouth Daily.     • BIOTIN 5000 PO Take 1 tablet by mouth Daily.     • Calcium Carbonate-Vit D-Min (CALTRATE 600+D PLUS PO) Take 1  tablet by mouth Daily.     • Cholecalciferol (VITAMIN D3) 5000 units capsule capsule Take 5,000 Units by mouth Daily.     • donepezil (ARICEPT) 10 MG tablet Take 10 mg by mouth Every Night.     • estradiol (VAGIFEM) 10 MCG tablet vaginal tablet Insert 1 tablet into the vagina 2 (Two) Times a Week.     • Magnesium 250 MG tablet Take  by mouth 2 (two) times a day.     • Multiple Vitamins-Minerals (MULTIPLE VITAMINS/WOMENS PO) Take 1 tablet by mouth Daily.     • pregabalin (LYRICA) 75 MG capsule Take 75 mg by mouth 4 (Four) Times a Day.     • Psyllium (METAMUCIL FIBER PO) Take 1 package by mouth Daily As Needed (constipation).     • zolpidem (Ambien) 10 MG tablet Take 10 mg by mouth At Night As Needed for Sleep. PT takes 5-10 mg at night         Allergies   Allergen Reactions   • Penicillins Nausea And Vomiting   • Quinine Derivatives Nausea And Vomiting   • Tizanidine Other (See Comments)     Weakness, fever   • Sulfa Antibiotics Rash       Social History     Socioeconomic History   • Marital status:      Spouse name: Not on file   • Number of children: Not on file   • Years of education: Not on file   • Highest education level: Not on file   Tobacco Use   • Smoking status: Never Smoker   • Smokeless tobacco: Never Used   Substance and Sexual Activity   • Alcohol use: Yes     Comment: wine occasional   • Drug use: No       Family History   Problem Relation Age of Onset   • Colon polyps Son    • Stroke Mother    • Heart attack Father    • GI problems Neg Hx    • Colon cancer Neg Hx        Review of Systems   Constitutional: Negative for activity change, appetite change, chills, diaphoresis, fatigue, fever and unexpected weight change.   HENT: Negative for ear pain, hearing loss, mouth sores, sore throat, trouble swallowing and voice change.    Eyes: Negative.    Respiratory: Negative for cough, choking, shortness of breath and wheezing.    Cardiovascular: Negative for chest pain and palpitations.  "  Gastrointestinal: Negative for abdominal pain, blood in stool, constipation, diarrhea, nausea and vomiting.   Endocrine: Negative for cold intolerance and heat intolerance.   Genitourinary: Negative for decreased urine volume, dysuria, frequency, hematuria and urgency.   Musculoskeletal: Negative for back pain, gait problem and myalgias.   Skin: Negative for color change, pallor and rash.   Allergic/Immunologic: Negative for food allergies and immunocompromised state.   Neurological: Negative for dizziness, tremors, seizures, syncope, weakness, light-headedness, numbness and headaches.   Hematological: Negative for adenopathy. Does not bruise/bleed easily.   Psychiatric/Behavioral: Negative for agitation and confusion. The patient is not nervous/anxious.    All other systems reviewed and are negative.      /70   Pulse 71   Temp 97.8 °F (36.6 °C)   Ht 172.7 cm (68\")   Wt 65.8 kg (145 lb)   SpO2 96%   Breastfeeding No   BMI 22.05 kg/m²   Body mass index is 22.05 kg/m².    Physical Exam  Constitutional:       Appearance: She is well-developed.   HENT:      Head: Normocephalic and atraumatic.   Eyes:      Pupils: Pupils are equal, round, and reactive to light.   Neck:      Trachea: No tracheal deviation.   Cardiovascular:      Rate and Rhythm: Normal rate and regular rhythm.      Heart sounds: Normal heart sounds. No murmur heard.   No friction rub. No gallop.    Pulmonary:      Effort: Pulmonary effort is normal. No respiratory distress.      Breath sounds: Normal breath sounds. No wheezing or rales.   Chest:      Chest wall: No tenderness.   Abdominal:      General: Bowel sounds are normal. There is no distension.      Palpations: Abdomen is soft. Abdomen is not rigid.      Tenderness: There is no abdominal tenderness. There is no guarding or rebound.   Musculoskeletal:         General: No tenderness or deformity. Normal range of motion.      Cervical back: Normal range of motion and neck supple. "   Skin:     General: Skin is warm and dry.      Coloration: Skin is not pale.      Findings: No rash.   Neurological:      Mental Status: She is alert and oriented to person, place, and time.      Deep Tendon Reflexes: Reflexes are normal and symmetric.   Psychiatric:         Behavior: Behavior normal.         Thought Content: Thought content normal.         Judgment: Judgment normal.         ASSESSMENT AND PLAN    Patient's Body mass index is 22.05 kg/m². BMI is within normal parameters. No follow-up required..    Diagnoses and all orders for this visit:    1. Diarrhea of presumed infectious origin (Primary)    2. Nonsmoker    issues seems to have resolved. Diarrhea is better. Continue fiber regimen.       There are no Patient Instructions on file for this visit.  Aga Hendrickson, APRN  12:51 CDT  3/18/2021    Obesity, Adult  Obesity is the condition of having too much total body fat. Being overweight or obese means that your weight is greater than what is considered healthy for your body size. Obesity is determined by a measurement called BMI. BMI is an estimate of body fat and is calculated from height and weight. For adults, a BMI of 30 or higher is considered obese.  Obesity can eventually lead to other health concerns and major illnesses, including:  · Stroke.  · Coronary artery disease (CAD).  · Type 2 diabetes.  · Some types of cancer, including cancers of the colon, breast, uterus, and gallbladder.  · Osteoarthritis.  · High blood pressure (hypertension).  · High cholesterol.  · Sleep apnea.  · Gallbladder stones.  · Infertility problems.  What are the causes?  The main cause of obesity is taking in (consuming) more calories than your body uses for energy. Other factors that contribute to this condition may include:  · Being born with genes that make you more likely to become obese.  · Having a medical condition that causes obesity. These conditions include:  ¨ Hypothyroidism.  ¨ Polycystic ovarian  syndrome (PCOS).  ¨ Binge-eating disorder.  ¨ Cushing syndrome.  · Taking certain medicines, such as steroids, antidepressants, and seizure medicines.  · Not being physically active (sedentary lifestyle).  · Living where there are limited places to exercise safely or buy healthy foods.  · Not getting enough sleep.  What increases the risk?  The following factors may increase your risk of this condition:  · Having a family history of obesity.  · Being a woman of -American descent.  · Being a man of  descent.  What are the signs or symptoms?  Having excessive body fat is the main symptom of this condition.  How is this diagnosed?  This condition may be diagnosed based on:  · Your symptoms.  · Your medical history.  · A physical exam. Your health care provider may measure:  ¨ Your BMI. If you are an adult with a BMI between 25 and less than 30, you are considered overweight. If you are an adult with a BMI of 30 or higher, you are considered obese.  ¨ The distances around your hips and your waist (circumferences). These may be compared to each other to help diagnose your condition.  ¨ Your skinfold thickness. Your health care provider may gently pinch a fold of your skin and measure it.  How is this treated?  Treatment for this condition often includes changing your lifestyle. Treatment may include some or all of the following:  · Dietary changes. Work with your health care provider and a dietitian to set a weight-loss goal that is healthy and reasonable for you. Dietary changes may include eating:  ¨ Smaller portions. A portion size is the amount of a particular food that is healthy for you to eat at one time. This varies from person to person.  ¨ Low-calorie or low-fat options.  ¨ More whole grains, fruits, and vegetables.  · Regular physical activity. This may include aerobic activity (cardio) and strength training.  · Medicine to help you lose weight. Your health care provider may prescribe medicine  if you are unable to lose 1 pound a week after 6 weeks of eating more healthily and doing more physical activity.  · Surgery. Surgical options may include gastric banding and gastric bypass. Surgery may be done if:  ¨ Other treatments have not helped to improve your condition.  ¨ You have a BMI of 40 or higher.  ¨ You have life-threatening health problems related to obesity.  Follow these instructions at home:     Eating and drinking     · Follow recommendations from your health care provider about what you eat and drink. Your health care provider may advise you to:  ¨ Limit fast foods, sweets, and processed snack foods.  ¨ Choose low-fat options, such as low-fat milk instead of whole milk.  ¨ Eat 5 or more servings of fruits or vegetables every day.  ¨ Eat at home more often. This gives you more control over what you eat.  ¨ Choose healthy foods when you eat out.  ¨ Learn what a healthy portion size is.  ¨ Keep low-fat snacks on hand.  ¨ Avoid sugary drinks, such as soda, fruit juice, iced tea sweetened with sugar, and flavored milk.  ¨ Eat a healthy breakfast.  · Drink enough water to keep your urine clear or pale yellow.  · Do not go without eating for long periods of time (do not fast) or follow a fad diet. Fasting and fad diets can be unhealthy and even dangerous.  Physical Activity   · Exercise regularly, as told by your health care provider. Ask your health care provider what types of exercise are safe for you and how often you should exercise.  · Warm up and stretch before being active.  · Cool down and stretch after being active.  · Rest between periods of activity.  Lifestyle   · Limit the time that you spend in front of your TV, computer, or video game system.  · Find ways to reward yourself that do not involve food.  · Limit alcohol intake to no more than 1 drink a day for nonpregnant women and 2 drinks a day for men. One drink equals 12 oz of beer, 5 oz of wine, or 1½ oz of hard liquor.  General  instructions   · Keep a weight loss journal to keep track of the food you eat and how much you exercise you get.  · Take over-the-counter and prescription medicines only as told by your health care provider.  · Take vitamins and supplements only as told by your health care provider.  · Consider joining a support group. Your health care provider may be able to recommend a support group.  · Keep all follow-up visits as told by your health care provider. This is important.  Contact a health care provider if:  · You are unable to meet your weight loss goal after 6 weeks of dietary and lifestyle changes.  This information is not intended to replace advice given to you by your health care provider. Make sure you discuss any questions you have with your health care provider.  Document Released: 01/25/2006 Document Revised: 05/22/2017 Document Reviewed: 10/05/2016  [employer] Patient Education © 2017 [employer]      IF YOU SMOKE OR USE TOBACCO PLEASE READ THE FOLLOWING:    Why is smoking bad for me?  Smoking increases the risk of heart disease, lung disease, vascular disease, stroke, and cancer.     If you smoke, STOP!    If you would like more information on quitting smoking, please visit the [employer] website: www.Keep Me Certified/GoTaxi([employer]abe[employer])ate/healthier-together/smoke   This link will provide additional resources including the QUIT line and the [employer] support groups.     For more information:    Quit Now [place]  1-800-QUIT-NOW  https://[place].quitlogix.org/en-[place]/

## 2021-04-09 ENCOUNTER — OFFICE VISIT (OUTPATIENT)
Dept: OBGYN CLINIC | Age: 86
End: 2021-04-09
Payer: MEDICARE

## 2021-04-09 VITALS
DIASTOLIC BLOOD PRESSURE: 82 MMHG | WEIGHT: 145 LBS | BODY MASS INDEX: 21.98 KG/M2 | HEART RATE: 80 BPM | SYSTOLIC BLOOD PRESSURE: 120 MMHG | HEIGHT: 68 IN

## 2021-04-09 DIAGNOSIS — Z78.0 POSTMENOPAUSAL: ICD-10-CM

## 2021-04-09 DIAGNOSIS — Z01.419 ENCOUNTER FOR ROUTINE GYNECOLOGIC EXAMINATION IN MEDICARE PATIENT: Primary | ICD-10-CM

## 2021-04-09 DIAGNOSIS — Z12.31 ENCOUNTER FOR SCREENING MAMMOGRAM FOR MALIGNANT NEOPLASM OF BREAST: ICD-10-CM

## 2021-04-09 DIAGNOSIS — Z12.72 SCREENING FOR VAGINAL CANCER: ICD-10-CM

## 2021-04-09 DIAGNOSIS — N39.41 URGE INCONTINENCE: ICD-10-CM

## 2021-04-09 DIAGNOSIS — N81.11 CYSTOCELE, MIDLINE: ICD-10-CM

## 2021-04-09 PROCEDURE — 0509F URINE INCON PLAN DOCD: CPT | Performed by: NURSE PRACTITIONER

## 2021-04-09 PROCEDURE — 99214 OFFICE O/P EST MOD 30 MIN: CPT | Performed by: NURSE PRACTITIONER

## 2021-04-09 PROCEDURE — 1036F TOBACCO NON-USER: CPT | Performed by: NURSE PRACTITIONER

## 2021-04-09 PROCEDURE — G8427 DOCREV CUR MEDS BY ELIG CLIN: HCPCS | Performed by: NURSE PRACTITIONER

## 2021-04-09 PROCEDURE — 1123F ACP DISCUSS/DSCN MKR DOCD: CPT | Performed by: NURSE PRACTITIONER

## 2021-04-09 PROCEDURE — 4040F PNEUMOC VAC/ADMIN/RCVD: CPT | Performed by: NURSE PRACTITIONER

## 2021-04-09 PROCEDURE — G0101 CA SCREEN;PELVIC/BREAST EXAM: HCPCS | Performed by: NURSE PRACTITIONER

## 2021-04-09 PROCEDURE — 1090F PRES/ABSN URINE INCON ASSESS: CPT | Performed by: NURSE PRACTITIONER

## 2021-04-09 PROCEDURE — G8420 CALC BMI NORM PARAMETERS: HCPCS | Performed by: NURSE PRACTITIONER

## 2021-04-09 RX ORDER — OXYBUTYNIN CHLORIDE 5 MG/1
5 TABLET, EXTENDED RELEASE ORAL DAILY
Qty: 30 TABLET | Refills: 3 | Status: SHIPPED | OUTPATIENT
Start: 2021-04-09 | End: 2022-06-04

## 2021-04-09 ASSESSMENT — ENCOUNTER SYMPTOMS
EYES NEGATIVE: 1
DIARRHEA: 0
ALLERGIC/IMMUNOLOGIC NEGATIVE: 1
GASTROINTESTINAL NEGATIVE: 1
CONSTIPATION: 0
RESPIRATORY NEGATIVE: 1

## 2021-04-09 NOTE — PATIENT INSTRUCTIONS
Patient Education        Breast Self-Exam: Care Instructions  Your Care Instructions     A breast self-exam is when you check your breasts for lumps or changes. This regular exam helps you learn how your breasts normally look and feel. Most breast problems or changes are not because of cancer. Breast self-exam is not a substitute for a mammogram. Having regular breast exams by your doctor and regular mammograms improve your chances of finding any problems with your breasts. Some women set a time each month to do a step-by-step breast self-exam. Other women like a less formal system. They might look at their breasts as they brush their teeth, or feel their breasts once in a while in the shower. If you notice a change in your breast, tell your doctor. Follow-up care is a key part of your treatment and safety. Be sure to make and go to all appointments, and call your doctor if you are having problems. It's also a good idea to know your test results and keep a list of the medicines you take. How do you do a breast self-exam?  · The best time to examine your breasts is usually one week after your menstrual period begins. Your breasts should not be tender then. If you do not have periods, you might do your exam on a day of the month that is easy to remember. · To examine your breasts:  ? Remove all your clothes above the waist and lie down. When you are lying down, your breast tissue spreads evenly over your chest wall, which makes it easier to feel all your breast tissue. ? Use the pads--not the fingertips--of the 3 middle fingers of your left hand to check your right breast. Move your fingers slowly in small coin-sized circles that overlap. ? Use three levels of pressure to feel of all your breast tissue. Use light pressure to feel the tissue close to the skin surface. Use medium pressure to feel a little deeper. Use firm pressure to feel your tissue close to your breastbone and ribs.  Use each pressure level to feel your breast tissue before moving on to the next spot. ? Check your entire breast, moving up and down as if following a strip from the collarbone to the bra line, and from the armpit to the ribs. Repeat until you have covered the entire breast.  ? Repeat this procedure for your left breast, using the pads of the 3 middle fingers of your right hand. · To examine your breasts while in the shower:  ? Place one arm over your head and lightly soap your breast on that side. ? Using the pads of your fingers, gently move your hand over your breast (in the strip pattern described above), feeling carefully for any lumps or changes. ? Repeat for the other breast.  · Have your doctor inspect anything you notice to see if you need further testing. Where can you learn more? Go to [email or web address]://[email or web address].[email or web address]. org and sign in to your [employer] account. Enter P148 in the [employer] box to learn more about \"Breast Self-Exam: Care Instructions. \"     If you do not have an account, please click on the \"Sign Up Now\" link. Current as of: December 17, 2020               Content Version: 12.8  © 1922-9892 [employer]. Care instructions adapted under license by [facility] ([facility]). If you have questions about a medical condition or this instruction, always ask your healthcare professional. [address] any warranty or liability for your use of this information.

## 2021-04-09 NOTE — PROGRESS NOTES
Pt presents today for pap smear and breast exam.      [id number]  Pap [id number]  Contraception:Hysterectomy   G:3  P:3  Ab:0  Bone density:NA   Colonoscopy:not sure of yr

## 2021-04-09 NOTE — PROGRESS NOTES
Yesi Sanon is a 80 y.o. female who presents today for her medical conditions/ complaints as noted below. Yesi Sanon is c/o of Gynecologic Exam        HPI   Pt presents for annual exam and pap smear. Needs orders for screening mammogram and DEXA. Dr Elpidio Lagunas PCP- draws labs and manages meds. Using vaginal estrogen with good relief of dryness. C/o increase in leaking on the way to the bathroom. Can hold her bladder for about 2 hours and that's it. Has to wear a pad. [id number]  Pap [id number]  Contraception:Hysterectomy   G:3  P:3  Ab:0  Bone density:NA   Colonoscopy:not sure of yr   No LMP recorded. Patient has had a hysterectomy.       Past Medical History:   Diagnosis Date    Cystic disease of breast     Heart murmur     Dr. Maria Arciniega Hypertension     Status post placement of implantable loop recorder 2017    Type II or unspecified type diabetes mellitus without mention of complication, not stated as uncontrolled     borderline     Past Surgical History:   Procedure Laterality Date    APPENDECTOMY      BREAST SURGERY      biopsy x3 benign    CHOLECYSTECTOMY      2020    COLONOSCOPY      HERNIA REPAIR      HYSTERECTOMY, TOTAL ABDOMINAL      Dr. Jojo Ferguson       Family History   Problem Relation Age of Onset    Heart Disease Father      Social History     Tobacco Use    Smoking status: Never Smoker    Smokeless tobacco: Never Used   Substance Use Topics    Alcohol use: Yes     Comment: small glass wine daily       Current Outpatient Medications   Medication Sig Dispense Refill    DICLOFENAC SODIUM ER PO Take by mouth Indications: 75 mg prn      Estradiol (VAGIFEM) 10 MCG TABS vaginal tablet INSERT 1 TABLET VAGINALLY TWICE A WEEK 8 tablet 1    conjugated estrogens (PREMARIN) 0.625 MG/GM vaginal cream Place 0.5 g vaginally Twice a Week 1 Tube 3    amLODIPine (NORVASC) 2.5 MG tablet 2.5 mg      clobetasol (TEMOVATE) 0.05 % ointment Apply topically prn 1 Tube 0    metoprolol succinate (TOPROL XL) 25 MG extended release tablet TAKE 1 TABLET TWICE A DAY (Patient taking differently: 25 mg Indications: 1/2 in evening ) 180 tablet 3    donepezil (ARICEPT) 5 MG tablet Take 10 mg by mouth nightly       Alpha Lipoic Acid 200 MG CAPS Take by mouth      lisinopril (PRINIVIL;ZESTRIL) 20 MG tablet Take 20 mg by mouth daily       pregabalin (LYRICA) 150 MG capsule Take 100 mg by mouth 3 times daily.  DULoxetine (CYMBALTA) 30 MG extended release capsule Take 30 mg by mouth daily      Calcium Carbonate Antacid (TUMS PO) Take 400 mg by mouth daily PT TAKES 2 400 MG TABLETS DAILY      Potassium 99 MG TABS Take 99 mg by mouth daily      Biotin 1000 MCG TABS Take 5,000 mcg by mouth daily      GLUCOSAMINE-CHONDROITIN PO Take 1,000 mg by mouth daily      Probiotic Product (PROBIOTIC DAILY PO) Take 1 tablet by mouth daily      Cholecalciferol (VITAMIN D3) 1000 units TABS Take 1 tablet by mouth daily      zolpidem (AMBIEN) 5 MG tablet Take 5 mg by mouth as needed for Sleep      cloNIDine (CATAPRES) 0.2 MG tablet Take 0.2 mg by mouth nightly       metformin (GLUCOPHAGE-XR) 500 MG XR tablet Take 500 mg by mouth 3 times daily       vitamin B-12 (CYANOCOBALAMIN) 100 MCG tablet Take 100 mcg by mouth daily        No current facility-administered medications for this visit. Allergies   Allergen Reactions    Penicillins Nausea And Vomiting    Quinine Derivatives Nausea And Vomiting    Tizanidine      Weakness, fever    Sulfa Antibiotics Rash     There were no vitals filed for this visit. Body mass index is 22.05 kg/m². Review of Systems   Constitutional: Negative. HENT: Negative. Eyes: Negative. Respiratory: Negative. Cardiovascular: Negative. Gastrointestinal: Negative. Negative for constipation and diarrhea. Endocrine: Negative. Genitourinary: Positive for urgency. Negative for frequency and menstrual problem. Musculoskeletal: Negative.     Skin: Negative. Allergic/Immunologic: Negative. Neurological: Negative. Hematological: Negative. Psychiatric/Behavioral: Negative. All other systems reviewed and are negative. Physical Exam  Vitals signs and nursing note reviewed. Constitutional:       Appearance: She is well-developed. HENT:      Head: Normocephalic. Neck:      Musculoskeletal: Normal range of motion and neck supple. Thyroid: No thyroid mass or thyromegaly. Cardiovascular:      Rate and Rhythm: Normal rate and regular rhythm. Pulmonary:      Effort: Pulmonary effort is normal.      Breath sounds: Normal breath sounds. Chest:      Breasts:         Right: No inverted nipple, mass, nipple discharge or skin change. Left: No inverted nipple, mass, nipple discharge or skin change. Abdominal:      Palpations: Abdomen is soft. There is no mass. Tenderness: There is no abdominal tenderness. Genitourinary:     General: Normal vulva. Vagina: Normal.      Uterus: Absent. Adnexa:         Right: No mass or tenderness. Left: No mass or tenderness. Comments: Pap collected  Cervix and uterus surgically absent  Vaginal cuff palpates smooth  Grade 3 cystocele  Musculoskeletal: Normal range of motion. Skin:     General: Skin is warm and dry. Neurological:      Mental Status: She is alert and oriented to person, place, and time. Psychiatric:         Attention and Perception: Attention normal.         Mood and Affect: Mood normal.         Speech: Speech normal.         Behavior: Behavior normal.         Thought Content: Thought content normal.         Cognition and Memory: Cognition normal.         Judgment: Judgment normal.          Diagnosis Orders   1. Encounter for routine gynecologic examination in Medicare patient     2. Screening for vaginal cancer  PAP SMEAR    Human papillomavirus (HPV) DNA probe thin prep high risk   3.  Encounter for screening mammogram for malignant neoplasm of breast  FLORENCIA DIGITAL SCREEN W OR WO CAD BILATERAL   4. Postmenopausal  DEXA BONE DENSITY AXIAL SKELETON       MEDICATIONS:  No orders of the defined types were placed in this encounter. ORDERS:  Orders Placed This Encounter   Procedures    FLORENCIA DIGITAL SCREEN W OR WO CAD BILATERAL    DEXA BONE DENSITY AXIAL SKELETON    PAP SMEAR    Human papillomavirus (HPV) DNA probe thin prep high risk       PLAN:  Pap collected  Ordered screening mammogram and DEXA  Starting Oxybutynin for urge incontinence    Patient Instructions   Patient Education        Breast Self-Exam: Care Instructions  Your Care Instructions     A breast self-exam is when you check your breasts for lumps or changes. This regular exam helps you learn how your breasts normally look and feel. Most breast problems or changes are not because of cancer. Breast self-exam is not a substitute for a mammogram. Having regular breast exams by your doctor and regular mammograms improve your chances of finding any problems with your breasts. Some women set a time each month to do a step-by-step breast self-exam. Other women like a less formal system. They might look at their breasts as they brush their teeth, or feel their breasts once in a while in the shower. If you notice a change in your breast, tell your doctor. Follow-up care is a key part of your treatment and safety. Be sure to make and go to all appointments, and call your doctor if you are having problems. It's also a good idea to know your test results and keep a list of the medicines you take. How do you do a breast self-exam?  · The best time to examine your breasts is usually one week after your menstrual period begins. Your breasts should not be tender then. If you do not have periods, you might do your exam on a day of the month that is easy to remember. · To examine your breasts:  ? Remove all your clothes above the waist and lie down.  When you are lying down, your breast tissue spreads evenly over your chest wall, which makes it easier to feel all your breast tissue. ? Use the pads--not the fingertips--of the 3 middle fingers of your left hand to check your right breast. Move your fingers slowly in small coin-sized circles that overlap. ? Use three levels of pressure to feel of all your breast tissue. Use light pressure to feel the tissue close to the skin surface. Use medium pressure to feel a little deeper. Use firm pressure to feel your tissue close to your breastbone and ribs. Use each pressure level to feel your breast tissue before moving on to the next spot. ? Check your entire breast, moving up and down as if following a strip from the collarbone to the bra line, and from the armpit to the ribs. Repeat until you have covered the entire breast.  ? Repeat this procedure for your left breast, using the pads of the 3 middle fingers of your right hand. · To examine your breasts while in the shower:  ? Place one arm over your head and lightly soap your breast on that side. ? Using the pads of your fingers, gently move your hand over your breast (in the strip pattern described above), feeling carefully for any lumps or changes. ? Repeat for the other breast.  · Have your doctor inspect anything you notice to see if you need further testing. Where can you learn more? Go to [email or web address]. org and sign in to your [employer] account. Enter P148 in the [facility] box to learn more about \"Breast Self-Exam: Care Instructions. \"     If you do not have an account, please click on the \"Sign Up Now\" link. Current as of: December 17, 2020               Content Version: 12.8  © 9145-8251 [employer]. Care instructions adapted under license by [facility] [facility] ([facility]).  If you have questions about a medical condition or this instruction, always ask your healthcare professional. Sandee Valadez any warranty or liability for your use of this information.

## 2021-04-15 LAB
HPV COMMENT: NORMAL
HPV TYPE 16: NOT DETECTED
HPV TYPE 18: NOT DETECTED
HPVOH (OTHER TYPES): NOT DETECTED

## 2021-05-06 RX ORDER — ESTRADIOL 10 UG/1
INSERT VAGINAL
Qty: 8 TABLET | Refills: 11 | Status: SHIPPED | OUTPATIENT
Start: 2021-05-06 | End: 2022-04-14 | Stop reason: SDUPTHER

## 2021-05-13 ENCOUNTER — HOSPITAL ENCOUNTER (OUTPATIENT)
Dept: WOMENS IMAGING | Age: 86
Discharge: HOME | End: 2021-05-13
Payer: MEDICARE

## 2021-05-13 DIAGNOSIS — Z78.0 POSTMENOPAUSAL: ICD-10-CM

## 2021-05-13 PROCEDURE — 77080 DXA BONE DENSITY AXIAL: CPT

## 2021-09-15 ENCOUNTER — OFFICE VISIT (OUTPATIENT)
Dept: GASTROENTEROLOGY | Facility: CLINIC | Age: 86
End: 2021-09-15

## 2021-09-15 VITALS
SYSTOLIC BLOOD PRESSURE: 128 MMHG | BODY MASS INDEX: 22.88 KG/M2 | HEIGHT: 68 IN | OXYGEN SATURATION: 97 % | DIASTOLIC BLOOD PRESSURE: 72 MMHG | HEART RATE: 68 BPM | TEMPERATURE: 97.3 F | WEIGHT: 151 LBS

## 2021-09-15 DIAGNOSIS — R19.7 DIARRHEA OF PRESUMED INFECTIOUS ORIGIN: Primary | ICD-10-CM

## 2021-09-15 DIAGNOSIS — E66.9 OBESITY, UNSPECIFIED OBESITY SEVERITY, UNSPECIFIED OBESITY TYPE: ICD-10-CM

## 2021-09-15 DIAGNOSIS — Z78.9 NONSMOKER: ICD-10-CM

## 2021-09-15 PROCEDURE — 99213 OFFICE O/P EST LOW 20 MIN: CPT | Performed by: CLINICAL NURSE SPECIALIST

## 2021-09-15 RX ORDER — OXYBUTYNIN CHLORIDE 5 MG/1
5 TABLET ORAL 3 TIMES DAILY
Status: ON HOLD | COMMUNITY
End: 2023-03-05

## 2021-09-15 RX ORDER — MONTELUKAST SODIUM 4 MG/1
1 TABLET, CHEWABLE ORAL 4 TIMES DAILY
Qty: 120 TABLET | Refills: 10 | Status: SHIPPED | OUTPATIENT
Start: 2021-09-15 | End: 2021-10-28 | Stop reason: SDUPTHER

## 2021-09-15 NOTE — PROGRESS NOTES
Hannah Dasilva  11/27/1929    9/15/2021  Chief Complaint   Patient presents with   • GI Problem     Diarrhea     Subjective   HPI  Hannah Dasilva is a 89 y.o. female who presents with a complaint of diarrhea returning after having resolved in March after a round of Flagyl for WBCs in her stool. She says that this has been going on for approx 4 weeks again this time. She is going up to 8 times per day and it is runny and loose, very little form.   No bleeding. No abdominal pain. No recent antibiotics. No fever. No wt loss.       Past Medical History:   Diagnosis Date   • Diabetes mellitus (CMS/HCC)    • Disc degeneration, lumbar    • History of adenomatous polyp of colon    • Hypercholesterolemia    • Irregular heartbeat    • LAFB (left anterior fascicular block) 10/26/2018   • MVP (mitral valve prolapse)    • Osteoarthritis    • Sciatica      Past Surgical History:   Procedure Laterality Date   • APPENDECTOMY     • BLADDER REPAIR     • CHOLECYSTECTOMY WITH INTRAOPERATIVE CHOLANGIOGRAM N/A 3/31/2020    Procedure: CHOLECYSTECTOMY LAPAROSCOPIC INTRAOPERATIVE CHOLANGIOGRAM;  Surgeon: Jenna Kinney MD;  Location: [facility];  Service: General;  Laterality: N/A;   • COLONOSCOPY  02/23/2010    diverticulosis   • COLONOSCOPY W/ POLYPECTOMY  11/06/2006    ASCENDING COLON ADENOMATOUS POLYP   • ENDOSCOPY  10/04/2006    SBBX UNREMARKABLE, SMALL HH, UREA NEG   • HYSTERECTOMY     • TONSILLECTOMY         Outpatient Medications Marked as Taking for the 9/15/21 encounter (Office Visit) with Aga Hendrickson APRN   Medication Sig Dispense Refill   • acetaminophen (Tylenol) 325 MG tablet Take 2 tablets by mouth Every 6 (Six) Hours As Needed for Mild Pain .     • amLODIPine (NORVASC) 5 MG tablet Take 1 tablet by mouth Daily. 30 tablet 2   • B Complex Vitamins (B COMPLEX 50 PO) Take 1 tablet by mouth Daily.     • BIOTIN 5000 PO Take 1 tablet by mouth Daily.     • Calcium Carbonate-Vit D-Min (CALTRATE 600+D PLUS PO) Take 1  tablet by mouth Daily.     • Cholecalciferol (VITAMIN D3) 5000 units capsule capsule Take 5,000 Units by mouth Daily.     • donepezil (ARICEPT) 10 MG tablet Take 10 mg by mouth Every Night.     • Magnesium 250 MG tablet Take  by mouth 2 (two) times a day.     • metoprolol tartrate (LOPRESSOR) 25 MG tablet Take 25 mg by mouth. 1/2 tablet twice a day     • Multiple Vitamins-Minerals (MULTIPLE VITAMINS/WOMENS PO) Take 1 tablet by mouth Daily.     • oxybutynin (DITROPAN) 5 MG tablet Take 5 mg by mouth 3 (Three) Times a Day.     • pregabalin (LYRICA) 75 MG capsule Take 75 mg by mouth 4 (Four) Times a Day.     • Psyllium (METAMUCIL FIBER PO) Take 1 package by mouth 2 (Two) Times a Day.     • zolpidem (Ambien) 10 MG tablet Take 10 mg by mouth At Night As Needed for Sleep. PT takes 5-10 mg at night       Allergies   Allergen Reactions   • Penicillins Nausea And Vomiting   • Quinine Derivatives Nausea And Vomiting   • Tizanidine Other (See Comments)     Weakness, fever   • Sulfa Antibiotics Rash     Social History     Socioeconomic History   • Marital status:      Spouse name: Not on file   • Number of children: Not on file   • Years of education: Not on file   • Highest education level: Not on file   Tobacco Use   • Smoking status: Never Smoker   • Smokeless tobacco: Never Used   Substance and Sexual Activity   • Alcohol use: Yes     Comment: wine occasional   • Drug use: No     Family History   Problem Relation Age of Onset   • Colon polyps Son    • Stroke Mother    • Heart attack Father    • GI problems Neg Hx    • Colon cancer Neg Hx      Health Maintenance   Topic Date Due   • URINE MICROALBUMIN  Never done   • DXA SCAN  Never done   • Pneumococcal Vaccine 65+ (1 of 2 - PPSV23) Never done   • ZOSTER VACCINE (1 of 2) Never done   • TDAP/TD VACCINES (2 - Tdap) 10/15/2011   • ANNUAL WELLNESS VISIT  05/09/2020   • HEMOGLOBIN A1C  09/30/2020   • DIABETIC EYE EXAM  09/30/2020   • LIPID PANEL  03/31/2021   • INFLUENZA  "VACCINE  10/01/2021   • COVID-19 Vaccine  Completed     Review of Systems   Constitutional: Negative for activity change, appetite change, chills, diaphoresis, fatigue, fever and unexpected weight change.   HENT: Negative for ear pain, hearing loss, mouth sores, sore throat, trouble swallowing and voice change.    Eyes: Negative.    Respiratory: Negative for cough, choking, shortness of breath and wheezing.    Cardiovascular: Negative for chest pain and palpitations.   Gastrointestinal: Positive for diarrhea. Negative for abdominal pain, blood in stool, constipation, nausea and vomiting.   Endocrine: Negative for cold intolerance and heat intolerance.   Genitourinary: Negative for decreased urine volume, dysuria, frequency, hematuria and urgency.   Musculoskeletal: Negative for back pain, gait problem and myalgias.   Skin: Negative for color change, pallor and rash.   Allergic/Immunologic: Negative for food allergies and immunocompromised state.   Neurological: Negative for dizziness, tremors, seizures, syncope, weakness, light-headedness, numbness and headaches.   Hematological: Negative for adenopathy. Does not bruise/bleed easily.   Psychiatric/Behavioral: Negative for agitation and confusion. The patient is not nervous/anxious.    All other systems reviewed and are negative.    Objective   Vitals:    09/15/21 1348   BP: 128/72   Pulse: 68   Temp: 97.3 °F (36.3 °C)   SpO2: 97%   Weight: 68.5 kg (151 lb)   Height: 172.7 cm (68\")     Body mass index is 22.96 kg/m².  Physical Exam  Constitutional:       Appearance: She is well-developed.   HENT:      Head: Normocephalic and atraumatic.   Eyes:      Pupils: Pupils are equal, round, and reactive to light.   Neck:      Trachea: No tracheal deviation.   Cardiovascular:      Rate and Rhythm: Normal rate and regular rhythm.      Heart sounds: Normal heart sounds. No murmur heard.   No friction rub. No gallop.    Pulmonary:      Effort: Pulmonary effort is normal. No " respiratory distress.      Breath sounds: Normal breath sounds. No wheezing or rales.   Chest:      Chest wall: No tenderness.   Abdominal:      General: Bowel sounds are normal. There is no distension.      Palpations: Abdomen is soft. Abdomen is not rigid.      Tenderness: There is no abdominal tenderness. There is no guarding or rebound.   Musculoskeletal:         General: No tenderness or deformity. Normal range of motion.      Cervical back: Normal range of motion and neck supple.   Skin:     General: Skin is warm and dry.      Coloration: Skin is not pale.      Findings: No rash.   Neurological:      Mental Status: She is alert and oriented to person, place, and time.      Deep Tendon Reflexes: Reflexes are normal and symmetric.   Psychiatric:         Behavior: Behavior normal.         Thought Content: Thought content normal.         Judgment: Judgment normal.       Assessment/Plan   Diagnoses and all orders for this visit:    1. Diarrhea of presumed infectious origin (Primary)  -     Gastrointestinal Panel, PCR - Stool, Per Rectum  -     Fecal Lactoferrin - Stool, Per Rectum; Future  -     Clostridium Difficile Toxin - Stool, Per Rectum  -     Ova & Parasite Examination - Stool, Per Rectum  -     colestipol (COLESTID) 1 g tablet; Take 1 tablet by mouth 4 (Four) Times a Day.  Dispense: 120 tablet; Refill: 10    2. Obesity, unspecified obesity severity, unspecified obesity type    3. Nonsmoker    watch dairy products  No cream sauces  High fatty foods avoid  No artificial sweeteners  A&D ointment for rectum and tenderness  Not having a gallbladder may be a contributing factor    Wants to avoid colonoscopy due to age. She is aware of all R/B/I/A.    Part of this note may be an electronic transcription/translation of spoken language to printed text using the Dragon Dictation System.  Body mass index is 22.96 kg/m².  No follow-ups on file.    Patient's Body mass index is 22.96 kg/m². indicating that she is within  normal range (BMI 18.5-24.9). No BMI management plan needed..      All risks, benefits, alternatives, and indications of colonoscopy and/or Endoscopy procedure have been discussed with the patient. Risks to include perforation of the colon requiring possible surgery or colostomy, risk of bleeding from biopsies or removal of colon tissue, possibility of missing a colon polyp or cancer, or adverse drug reaction.  Benefits to include the diagnosis and management of disease of the colon and rectum. Alternatives to include barium enema, radiographic evaluation, lab testing or no intervention. Pt verbalizes understanding and agrees.     Aga Hendrickson, APRN  9/15/2021  14:12 CDT          If you smoke or use tobacco, 4 minutes reading provided  Steps to Quit Smoking  Smoking tobacco can be harmful to your health and can affect almost every organ in your body. Smoking puts you, and those around you, at risk for developing many serious chronic diseases. Quitting smoking is difficult, but it is one of the best things that you can do for your health. It is never too late to quit.  What are the benefits of quitting smoking?  When you quit smoking, you lower your risk of developing serious diseases and conditions, such as:  · Lung cancer or lung disease, such as COPD.  · Heart disease.  · Stroke.  · Heart attack.  · Infertility.  · Osteoporosis and bone fractures.  Additionally, symptoms such as coughing, wheezing, and shortness of breath may get better when you quit. You may also find that you get sick less often because your body is stronger at fighting off colds and infections. If you are pregnant, quitting smoking can help to reduce your chances of having a baby of low birth weight.  How do I get ready to quit?  When you decide to quit smoking, create a plan to make sure that you are successful. Before you quit:  · Pick a date to quit. Set a date within the next two weeks to give you time to prepare.  · Write down the  reasons why you are quitting. Keep this list in places where you will see it often, such as on your bathroom mirror or in your car or wallet.  · Identify the people, places, things, and activities that make you want to smoke (triggers) and avoid them. Make sure to take these actions:  ¨ Throw away all cigarettes at home, at work, and in your car.  ¨ Throw away smoking accessories, such as ashtrays and lighters.  ¨ Clean your car and make sure to empty the ashtray.  ¨ Clean your home, including curtains and carpets.  · Tell your family, friends, and coworkers that you are quitting. Support from your loved ones can make quitting easier.  · Talk with your health care provider about your options for quitting smoking.  · Find out what treatment options are covered by your health insurance.  What strategies can I use to quit smoking?  Talk with your healthcare provider about different strategies to quit smoking. Some strategies include:  · Quitting smoking altogether instead of gradually lessening how much you smoke over a period of time. Research shows that quitting “cold turkey” is more successful than gradually quitting.  · Attending in-person counseling to help you build problem-solving skills. You are more likely to have success in quitting if you attend several counseling sessions. Even short sessions of 10 minutes can be effective.  · Finding resources and support systems that can help you to quit smoking and remain smoke-free after you quit. These resources are most helpful when you use them often. They can include:  ¨ Online chats with a counselor.  ¨ Telephone quitlines.  ¨ Printed self-help materials.  ¨ Support groups or group counseling.  ¨ Text messaging programs.  ¨ Mobile phone applications.  · Taking medicines to help you quit smoking. (If you are pregnant or breastfeeding, talk with your health care provider first.) Some medicines contain nicotine and some do not. Both types of medicines help with  cravings, but the medicines that include nicotine help to relieve withdrawal symptoms. Your health care provider may recommend:  ¨ Nicotine patches, gum, or lozenges.  ¨ Nicotine inhalers or sprays.  ¨ Non-nicotine medicine that is taken by mouth.  Talk with your health care provider about combining strategies, such as taking medicines while you are also receiving in-person counseling. Using these two strategies together makes you more likely to succeed in quitting than if you used either strategy on its own.  If you are pregnant or breastfeeding, talk with your health care provider about finding counseling or other support strategies to quit smoking. Do not take medicine to help you quit smoking unless told to do so by your health care provider.  What things can I do to make it easier to quit?  Quitting smoking might feel overwhelming at first, but there is a lot that you can do to make it easier. Take these important actions:  · Reach out to your family and friends and ask that they support and encourage you during this time. Call telephone quitlines, reach out to support groups, or work with a counselor for support.  · Ask people who smoke to avoid smoking around you.  · Avoid places that trigger you to smoke, such as bars, parties, or smoke-break areas at work.  · Spend time around people who do not smoke.  · Lessen stress in your life, because stress can be a smoking trigger for some people. To lessen stress, try:  ¨ Exercising regularly.  ¨ Deep-breathing exercises.  ¨ Yoga.  ¨ Meditating.  ¨ Performing a body scan. This involves closing your eyes, scanning your body from head to toe, and noticing which parts of your body are particularly tense. Purposefully relax the muscles in those areas.  · Download or purchase mobile phone or tablet apps (applications) that can help you stick to your quit plan by providing reminders, tips, and encouragement. There are many free apps, such as QuitGuide from the [employer]  (Centers for Disease Control and Prevention). You can find other support for quitting smoking (smoking cessation) through smokefree.gov and other websites.  How will I feel when I quit smoking?  Within the first 24 hours of quitting smoking, you may start to feel some withdrawal symptoms. These symptoms are usually most noticeable 2-3 days after quitting, but they usually do not last beyond 2-3 weeks. Changes or symptoms that you might experience include:  · Mood swings.  · Restlessness, anxiety, or irritation.  · Difficulty concentrating.  · Dizziness.  · Strong cravings for sugary foods in addition to nicotine.  · Mild weight gain.  · Constipation.  · Nausea.  · Coughing or a sore throat.  · Changes in how your medicines work in your body.  · A depressed mood.  · Difficulty sleeping (insomnia).  After the first 2-3 weeks of quitting, you may start to notice more positive results, such as:  · Improved sense of smell and taste.  · Decreased coughing and sore throat.  · Slower heart rate.  · Lower blood pressure.  · Clearer skin.  · The ability to breathe more easily.  · Fewer sick days.  Quitting smoking is very challenging for most people. Do not get discouraged if you are not successful the first time. Some people need to make many attempts to quit before they achieve long-term success. Do your best to stick to your quit plan, and talk with your health care provider if you have any questions or concerns.  This information is not intended to replace advice given to you by your health care provider. Make sure you discuss any questions you have with your health care provider.  Document Released: 12/12/2002 Document Revised: 08/15/2017 Document Reviewed: 05/03/2016  [employer] Patient Education © 2017 [employer]

## 2021-09-16 ENCOUNTER — LAB (OUTPATIENT)
Dept: LAB | Facility: HOSPITAL | Age: 86
End: 2021-09-16

## 2021-09-16 DIAGNOSIS — R19.7 DIARRHEA OF PRESUMED INFECTIOUS ORIGIN: ICD-10-CM

## 2021-09-16 LAB
ADV 40+41 DNA STL QL NAA+NON-PROBE: NOT DETECTED
ASTRO TYP 1-8 RNA STL QL NAA+NON-PROBE: NOT DETECTED
C CAYETANENSIS DNA STL QL NAA+NON-PROBE: NOT DETECTED
C COLI+JEJ+UPSA DNA STL QL NAA+NON-PROBE: NOT DETECTED
C DIFF TOX GENS STL QL NAA+PROBE: NEGATIVE
CRYPTOSP DNA STL QL NAA+NON-PROBE: NOT DETECTED
E HISTOLYT DNA STL QL NAA+NON-PROBE: NOT DETECTED
EAEC PAA PLAS AGGR+AATA ST NAA+NON-PRB: NOT DETECTED
EC STX1+STX2 GENES STL QL NAA+NON-PROBE: NOT DETECTED
EPEC EAE GENE STL QL NAA+NON-PROBE: DETECTED
ETEC LTA+ST1A+ST1B TOX ST NAA+NON-PROBE: NOT DETECTED
G LAMBLIA DNA STL QL NAA+NON-PROBE: NOT DETECTED
NOROVIRUS GI+II RNA STL QL NAA+NON-PROBE: NOT DETECTED
P SHIGELLOIDES DNA STL QL NAA+NON-PROBE: NOT DETECTED
RVA RNA STL QL NAA+NON-PROBE: NOT DETECTED
S ENT+BONG DNA STL QL NAA+NON-PROBE: NOT DETECTED
SAPO I+II+IV+V RNA STL QL NAA+NON-PROBE: NOT DETECTED
SHIGELLA SP+EIEC IPAH ST NAA+NON-PROBE: NOT DETECTED
V CHOL+PARA+VUL DNA STL QL NAA+NON-PROBE: NOT DETECTED
V CHOLERAE DNA STL QL NAA+NON-PROBE: NOT DETECTED
Y ENTEROCOL DNA STL QL NAA+NON-PROBE: NOT DETECTED

## 2021-09-16 PROCEDURE — 87493 C DIFF AMPLIFIED PROBE: CPT | Performed by: CLINICAL NURSE SPECIALIST

## 2021-09-16 PROCEDURE — 83630 LACTOFERRIN FECAL (QUAL): CPT

## 2021-09-16 PROCEDURE — 87177 OVA AND PARASITES SMEARS: CPT | Performed by: CLINICAL NURSE SPECIALIST

## 2021-09-16 PROCEDURE — 0097U HC BIOFIRE FILMARRAY GI PANEL: CPT | Performed by: CLINICAL NURSE SPECIALIST

## 2021-09-16 PROCEDURE — 87209 SMEAR COMPLEX STAIN: CPT | Performed by: CLINICAL NURSE SPECIALIST

## 2021-09-20 LAB
LACTOFERRIN STL QL LA: POSITIVE
O+P SPEC MICRO: NORMAL
O+P STL CONC: NORMAL

## 2021-09-30 ENCOUNTER — OFFICE VISIT (OUTPATIENT)
Dept: GASTROENTEROLOGY | Facility: CLINIC | Age: 86
End: 2021-09-30

## 2021-09-30 VITALS
WEIGHT: 147 LBS | DIASTOLIC BLOOD PRESSURE: 78 MMHG | TEMPERATURE: 97.7 F | HEART RATE: 82 BPM | HEIGHT: 68 IN | BODY MASS INDEX: 22.28 KG/M2 | OXYGEN SATURATION: 97 % | SYSTOLIC BLOOD PRESSURE: 128 MMHG

## 2021-09-30 DIAGNOSIS — Z78.9 NONSMOKER: ICD-10-CM

## 2021-09-30 DIAGNOSIS — R19.7 DIARRHEA OF PRESUMED INFECTIOUS ORIGIN: Primary | ICD-10-CM

## 2021-09-30 DIAGNOSIS — E66.9 OBESITY, UNSPECIFIED OBESITY SEVERITY, UNSPECIFIED OBESITY TYPE: ICD-10-CM

## 2021-09-30 PROCEDURE — 99212 OFFICE O/P EST SF 10 MIN: CPT | Performed by: CLINICAL NURSE SPECIALIST

## 2021-09-30 NOTE — PROGRESS NOTES
Hannah Dasilva  11/27/1929 9/30/2021  Chief Complaint   Patient presents with   • GI Problem     Discuss diarrhea         HPI    Hannah Dasilva is a  89 y.o. female here for a follow up visit for diarrhea course constant ongoing. She has had stool cultures positive for EPEC she was treated with Flagyl as well. She improved somewhat I ordered Colestid and she did not start this yet. She picked it up on Saturday and forgot what it was for.   No bleeding. No fever. No abdominal pain.     Past Medical History:   Diagnosis Date   • Diabetes mellitus (CMS/HCC)    • Disc degeneration, lumbar    • History of adenomatous polyp of colon    • Hypercholesterolemia    • Irregular heartbeat    • LAFB (left anterior fascicular block) 10/26/2018   • MVP (mitral valve prolapse)    • Osteoarthritis    • Sciatica      Past Surgical History:   Procedure Laterality Date   • APPENDECTOMY     • BLADDER REPAIR     • CHOLECYSTECTOMY WITH INTRAOPERATIVE CHOLANGIOGRAM N/A 3/31/2020    Procedure: CHOLECYSTECTOMY LAPAROSCOPIC INTRAOPERATIVE CHOLANGIOGRAM;  Surgeon: Jenna Kinney MD;  Location: [facility];  Service: General;  Laterality: N/A;   • COLONOSCOPY  02/23/2010    diverticulosis   • COLONOSCOPY W/ POLYPECTOMY  11/06/2006    ASCENDING COLON ADENOMATOUS POLYP   • ENDOSCOPY  10/04/2006    SBBX UNREMARKABLE, SMALL HH, UREA NEG   • HYSTERECTOMY     • TONSILLECTOMY         Outpatient Medications Marked as Taking for the 9/30/21 encounter (Office Visit) with Aga Hendrickson APRN   Medication Sig Dispense Refill   • acetaminophen (Tylenol) 325 MG tablet Take 2 tablets by mouth Every 6 (Six) Hours As Needed for Mild Pain .     • amLODIPine (NORVASC) 5 MG tablet Take 1 tablet by mouth Daily. 30 tablet 2   • B Complex Vitamins (B COMPLEX 50 PO) Take 1 tablet by mouth Daily.     • BIOTIN 5000 PO Take 1 tablet by mouth Daily.     • Calcium Carbonate-Vit D-Min (CALTRATE 600+D PLUS PO) Take 1 tablet by mouth Daily.     •  Cholecalciferol (VITAMIN D3) 5000 units capsule capsule Take 5,000 Units by mouth Daily.     • colestipol (COLESTID) 1 g tablet Take 1 tablet by mouth 4 (Four) Times a Day. 120 tablet 10   • donepezil (ARICEPT) 10 MG tablet Take 10 mg by mouth Every Night.     • Magnesium 250 MG tablet Take  by mouth 2 (two) times a day.     • metoprolol tartrate (LOPRESSOR) 25 MG tablet Take 25 mg by mouth. 1/2 tablet twice a day     • Multiple Vitamins-Minerals (MULTIPLE VITAMINS/WOMENS PO) Take 1 tablet by mouth Daily.     • oxybutynin (DITROPAN) 5 MG tablet Take 5 mg by mouth 3 (Three) Times a Day.     • pregabalin (LYRICA) 75 MG capsule Take 75 mg by mouth 4 (Four) Times a Day.     • Psyllium (METAMUCIL FIBER PO) Take 1 package by mouth 2 (Two) Times a Day.     • zolpidem (Ambien) 10 MG tablet Take 10 mg by mouth At Night As Needed for Sleep. PT takes 5-10 mg at night         Allergies   Allergen Reactions   • Penicillins Nausea And Vomiting   • Quinine Derivatives Nausea And Vomiting   • Tizanidine Other (See Comments)     Weakness, fever   • Sulfa Antibiotics Rash       Social History     Socioeconomic History   • Marital status:      Spouse name: Not on file   • Number of children: Not on file   • Years of education: Not on file   • Highest education level: Not on file   Tobacco Use   • Smoking status: Never Smoker   • Smokeless tobacco: Never Used   Substance and Sexual Activity   • Alcohol use: Yes     Comment: wine occasional   • Drug use: No       Family History   Problem Relation Age of Onset   • Colon polyps Son    • Stroke Mother    • Heart attack Father    • GI problems Neg Hx    • Colon cancer Neg Hx        Review of Systems   Constitutional: Negative for activity change, appetite change, chills, diaphoresis, fatigue, fever and unexpected weight change.   HENT: Negative for ear pain, hearing loss, mouth sores, sore throat, trouble swallowing and voice change.    Eyes: Negative.    Respiratory: Negative for  "cough, choking, shortness of breath and wheezing.    Cardiovascular: Negative for chest pain and palpitations.   Gastrointestinal: Positive for diarrhea. Negative for abdominal pain, blood in stool, constipation, nausea and vomiting.   Endocrine: Negative for cold intolerance and heat intolerance.   Genitourinary: Negative for decreased urine volume, dysuria, frequency, hematuria and urgency.   Musculoskeletal: Negative for back pain, gait problem and myalgias.   Skin: Negative for color change, pallor and rash.   Allergic/Immunologic: Negative for food allergies and immunocompromised state.   Neurological: Negative for dizziness, tremors, seizures, syncope, weakness, light-headedness, numbness and headaches.   Hematological: Negative for adenopathy. Does not bruise/bleed easily.   Psychiatric/Behavioral: Negative for agitation and confusion. The patient is not nervous/anxious.    All other systems reviewed and are negative.      /78   Pulse 82   Temp 97.7 °F (36.5 °C)   Ht 172.7 cm (68\")   Wt 66.7 kg (147 lb)   SpO2 97%   Breastfeeding No   BMI 22.35 kg/m²   Body mass index is 22.35 kg/m².    Physical Exam  Constitutional:       Appearance: She is well-developed.   HENT:      Head: Normocephalic and atraumatic.   Eyes:      Pupils: Pupils are equal, round, and reactive to light.   Neck:      Trachea: No tracheal deviation.   Cardiovascular:      Rate and Rhythm: Normal rate and regular rhythm.      Heart sounds: Normal heart sounds. No murmur heard.   No friction rub. No gallop.    Pulmonary:      Effort: Pulmonary effort is normal. No respiratory distress.      Breath sounds: Normal breath sounds. No wheezing or rales.   Chest:      Chest wall: No tenderness.   Abdominal:      General: Bowel sounds are normal. There is no distension.      Palpations: Abdomen is soft. Abdomen is not rigid.      Tenderness: There is no abdominal tenderness. There is no guarding or rebound.   Musculoskeletal:         " General: No tenderness or deformity. Normal range of motion.      Cervical back: Normal range of motion and neck supple.   Skin:     General: Skin is warm and dry.      Coloration: Skin is not pale.      Findings: No rash.   Neurological:      Mental Status: She is alert and oriented to person, place, and time.      Deep Tendon Reflexes: Reflexes are normal and symmetric.   Psychiatric:         Behavior: Behavior normal.         Thought Content: Thought content normal.         Judgment: Judgment normal.         ASSESSMENT AND PLAN    Patient's Body mass index is 22.35 kg/m². indicating that she is within normal range (BMI 18.5-24.9). No BMI management plan needed..    Diagnoses and all orders for this visit:    1. Diarrhea of presumed infectious origin (Primary)    2. Obesity, unspecified obesity severity, unspecified obesity type    3. Nonsmoker      To start the Colestid she has not started this to try. She is unsure that she has it. We have called the pharmacy and she picked it up on Saturday. She is going to go home a start this medication.     There are no Patient Instructions on file for this visit.  Aga Hendrickson, APRN  15:11 CDT  9/30/2021    Obesity, Adult  Obesity is the condition of having too much total body fat. Being overweight or obese means that your weight is greater than what is considered healthy for your body size. Obesity is determined by a measurement called BMI. BMI is an estimate of body fat and is calculated from height and weight. For adults, a BMI of 30 or higher is considered obese.  Obesity can eventually lead to other health concerns and major illnesses, including:  · Stroke.  · Coronary artery disease (CAD).  · Type 2 diabetes.  · Some types of cancer, including cancers of the colon, breast, uterus, and gallbladder.  · Osteoarthritis.  · High blood pressure (hypertension).  · High cholesterol.  · Sleep apnea.  · Gallbladder stones.  · Infertility problems.  What are the  causes?  The main cause of obesity is taking in (consuming) more calories than your body uses for energy. Other factors that contribute to this condition may include:  · Being born with genes that make you more likely to become obese.  · Having a medical condition that causes obesity. These conditions include:  ¨ Hypothyroidism.  ¨ Polycystic ovarian syndrome (PCOS).  ¨ Binge-eating disorder.  ¨ Cushing syndrome.  · Taking certain medicines, such as steroids, antidepressants, and seizure medicines.  · Not being physically active (sedentary lifestyle).  · Living where there are limited places to exercise safely or buy healthy foods.  · Not getting enough sleep.  What increases the risk?  The following factors may increase your risk of this condition:  · Having a family history of obesity.  · Being a woman of -American descent.  · Being a man of  descent.  What are the signs or symptoms?  Having excessive body fat is the main symptom of this condition.  How is this diagnosed?  This condition may be diagnosed based on:  · Your symptoms.  · Your medical history.  · A physical exam. Your health care provider may measure:  ¨ Your BMI. If you are an adult with a BMI between 25 and less than 30, you are considered overweight. If you are an adult with a BMI of 30 or higher, you are considered obese.  ¨ The distances around your hips and your waist (circumferences). These may be compared to each other to help diagnose your condition.  ¨ Your skinfold thickness. Your health care provider may gently pinch a fold of your skin and measure it.  How is this treated?  Treatment for this condition often includes changing your lifestyle. Treatment may include some or all of the following:  · Dietary changes. Work with your health care provider and a dietitian to set a weight-loss goal that is healthy and reasonable for you. Dietary changes may include eating:  ¨ Smaller portions. A portion size is the amount of a  particular food that is healthy for you to eat at one time. This varies from person to person.  ¨ Low-calorie or low-fat options.  ¨ More whole grains, fruits, and vegetables.  · Regular physical activity. This may include aerobic activity (cardio) and strength training.  · Medicine to help you lose weight. Your health care provider may prescribe medicine if you are unable to lose 1 pound a week after 6 weeks of eating more healthily and doing more physical activity.  · Surgery. Surgical options may include gastric banding and gastric bypass. Surgery may be done if:  ¨ Other treatments have not helped to improve your condition.  ¨ You have a BMI of 40 or higher.  ¨ You have life-threatening health problems related to obesity.  Follow these instructions at home:     Eating and drinking     · Follow recommendations from your health care provider about what you eat and drink. Your health care provider may advise you to:  ¨ Limit fast foods, sweets, and processed snack foods.  ¨ Choose low-fat options, such as low-fat milk instead of whole milk.  ¨ Eat 5 or more servings of fruits or vegetables every day.  ¨ Eat at home more often. This gives you more control over what you eat.  ¨ Choose healthy foods when you eat out.  ¨ Learn what a healthy portion size is.  ¨ Keep low-fat snacks on hand.  ¨ Avoid sugary drinks, such as soda, fruit juice, iced tea sweetened with sugar, and flavored milk.  ¨ Eat a healthy breakfast.  · Drink enough water to keep your urine clear or pale yellow.  · Do not go without eating for long periods of time (do not fast) or follow a fad diet. Fasting and fad diets can be unhealthy and even dangerous.  Physical Activity   · Exercise regularly, as told by your health care provider. Ask your health care provider what types of exercise are safe for you and how often you should exercise.  · Warm up and stretch before being active.  · Cool down and stretch after being active.  · Rest between periods of  activity.  Lifestyle   · Limit the time that you spend in front of your TV, computer, or video game system.  · Find ways to reward yourself that do not involve food.  · Limit alcohol intake to no more than 1 drink a day for nonpregnant women and 2 drinks a day for men. One drink equals 12 oz of beer, 5 oz of wine, or 1½ oz of hard liquor.  General instructions   · Keep a weight loss journal to keep track of the food you eat and how much you exercise you get.  · Take over-the-counter and prescription medicines only as told by your health care provider.  · Take vitamins and supplements only as told by your health care provider.  · Consider joining a support group. Your health care provider may be able to recommend a support group.  · Keep all follow-up visits as told by your health care provider. This is important.  Contact a health care provider if:  · You are unable to meet your weight loss goal after 6 weeks of dietary and lifestyle changes.  This information is not intended to replace advice given to you by your health care provider. Make sure you discuss any questions you have with your health care provider.  Document Released: 01/25/2006 Document Revised: 05/22/2017 Document Reviewed: 10/05/2016  [employer] Patient Education © 2017 [employer]      IF YOU SMOKE OR USE TOBACCO PLEASE READ THE FOLLOWING:    Why is smoking bad for me?  Smoking increases the risk of heart disease, lung disease, vascular disease, stroke, and cancer.     If you smoke, STOP!    If you would like more information on quitting smoking, please visit the [employer] website: www.[employer]/corporate/healthier-together/smoke   This link will provide additional resources including the QUIT line and the [employer] support groups.     For more information:    Quit Now [place]  1-800-QUIT-NOW  https://[place].[employer]logix.org/en-[place]/

## 2021-10-28 ENCOUNTER — OFFICE VISIT (OUTPATIENT)
Dept: GASTROENTEROLOGY | Facility: CLINIC | Age: 86
End: 2021-10-28

## 2021-10-28 VITALS
OXYGEN SATURATION: 99 % | BODY MASS INDEX: 22.43 KG/M2 | HEART RATE: 78 BPM | TEMPERATURE: 97.6 F | SYSTOLIC BLOOD PRESSURE: 122 MMHG | WEIGHT: 148 LBS | DIASTOLIC BLOOD PRESSURE: 62 MMHG | HEIGHT: 68 IN

## 2021-10-28 DIAGNOSIS — R19.7 DIARRHEA OF PRESUMED INFECTIOUS ORIGIN: ICD-10-CM

## 2021-10-28 PROCEDURE — 99213 OFFICE O/P EST LOW 20 MIN: CPT | Performed by: CLINICAL NURSE SPECIALIST

## 2021-10-28 RX ORDER — MONTELUKAST SODIUM 4 MG/1
1 TABLET, CHEWABLE ORAL 4 TIMES DAILY
Qty: 360 TABLET | Refills: 3 | Status: SHIPPED | OUTPATIENT
Start: 2021-10-28 | End: 2022-06-02 | Stop reason: SDUPTHER

## 2021-10-28 NOTE — PROGRESS NOTES
Hannah Dasilva  11/27/1929      10/28/2021  Chief Complaint   Patient presents with   • GI Problem     Diarrhea         HPI    Hannah Dasilva is a  89 y.o. female here for a follow up visit for complaint of hx of diarrhea. I put her on Colestid and she is doing well with this. She was positive previously for EPEC. She says that her diarrhea is resolved. She is taking her medication and doing well. No bleeding. No fever.     Past Medical History:   Diagnosis Date   • Diabetes mellitus (HCC)    • Disc degeneration, lumbar    • History of adenomatous polyp of colon    • Hypercholesterolemia    • Irregular heartbeat    • LAFB (left anterior fascicular block) 10/26/2018   • MVP (mitral valve prolapse)    • Osteoarthritis    • Sciatica      Past Surgical History:   Procedure Laterality Date   • APPENDECTOMY     • BLADDER REPAIR     • CHOLECYSTECTOMY WITH INTRAOPERATIVE CHOLANGIOGRAM N/A 3/31/2020    Procedure: CHOLECYSTECTOMY LAPAROSCOPIC INTRAOPERATIVE CHOLANGIOGRAM;  Surgeon: Jenna Kinney MD;  Location: [facility];  Service: General;  Laterality: N/A;   • COLONOSCOPY  02/23/2010    diverticulosis   • COLONOSCOPY W/ POLYPECTOMY  11/06/2006    ASCENDING COLON ADENOMATOUS POLYP   • ENDOSCOPY  10/04/2006    SBBX UNREMARKABLE, SMALL HH, UREA NEG   • HYSTERECTOMY     • TONSILLECTOMY         Outpatient Medications Marked as Taking for the 10/28/21 encounter (Office Visit) with Aga Hendrickson APRN   Medication Sig Dispense Refill   • acetaminophen (Tylenol) 325 MG tablet Take 2 tablets by mouth Every 6 (Six) Hours As Needed for Mild Pain .     • amLODIPine (NORVASC) 5 MG tablet Take 1 tablet by mouth Daily. 30 tablet 2   • B Complex Vitamins (B COMPLEX 50 PO) Take 1 tablet by mouth Daily.     • BIOTIN 5000 PO Take 1 tablet by mouth Daily.     • Calcium Carbonate-Vit D-Min (CALTRATE 600+D PLUS PO) Take 1 tablet by mouth Daily.     • Cholecalciferol (VITAMIN D3) 5000 units capsule capsule Take 5,000 Units by mouth  Daily.     • colestipol (COLESTID) 1 g tablet Take 1 tablet by mouth 4 (Four) Times a Day. 360 tablet 3   • donepezil (ARICEPT) 10 MG tablet Take 10 mg by mouth Every Night.     • Magnesium 250 MG tablet Take  by mouth 2 (two) times a day.     • metoprolol tartrate (LOPRESSOR) 25 MG tablet Take 25 mg by mouth. 1/2 tablet twice a day     • Multiple Vitamins-Minerals (MULTIPLE VITAMINS/WOMENS PO) Take 1 tablet by mouth Daily.     • oxybutynin (DITROPAN) 5 MG tablet Take 5 mg by mouth 3 (Three) Times a Day.     • pregabalin (LYRICA) 75 MG capsule Take 75 mg by mouth 4 (Four) Times a Day.     • Psyllium (METAMUCIL FIBER PO) Take 1 package by mouth 2 (Two) Times a Day.     • zolpidem (Ambien) 10 MG tablet Take 10 mg by mouth At Night As Needed for Sleep. PT takes 5-10 mg at night     • [DISCONTINUED] colestipol (COLESTID) 1 g tablet Take 1 tablet by mouth 4 (Four) Times a Day. 120 tablet 10       Allergies   Allergen Reactions   • Penicillins Nausea And Vomiting   • Quinine Derivatives Nausea And Vomiting   • Tizanidine Other (See Comments)     Weakness, fever   • Sulfa Antibiotics Rash       Social History     Socioeconomic History   • Marital status:    Tobacco Use   • Smoking status: Never Smoker   • Smokeless tobacco: Never Used   Substance and Sexual Activity   • Alcohol use: Yes     Comment: wine occasional   • Drug use: No       Family History   Problem Relation Age of Onset   • Colon polyps Son    • Stroke Mother    • Heart attack Father    • GI problems Neg Hx    • Colon cancer Neg Hx        Review of Systems   Constitutional: Negative for activity change, appetite change, chills, diaphoresis, fatigue, fever and unexpected weight change.   HENT: Negative for ear pain, hearing loss, mouth sores, sore throat, trouble swallowing and voice change.    Eyes: Negative.    Respiratory: Negative for cough, choking, shortness of breath and wheezing.    Cardiovascular: Negative for chest pain and palpitations.  "  Gastrointestinal: Negative for abdominal pain, blood in stool, constipation, diarrhea, nausea and vomiting.   Endocrine: Negative for cold intolerance and heat intolerance.   Genitourinary: Negative for decreased urine volume, dysuria, frequency, hematuria and urgency.   Musculoskeletal: Negative for back pain, gait problem and myalgias.   Skin: Negative for color change, pallor and rash.   Allergic/Immunologic: Negative for food allergies and immunocompromised state.   Neurological: Negative for dizziness, tremors, seizures, syncope, weakness, light-headedness, numbness and headaches.   Hematological: Negative for adenopathy. Does not bruise/bleed easily.   Psychiatric/Behavioral: Negative for agitation and confusion. The patient is not nervous/anxious.    All other systems reviewed and are negative.      /62   Pulse 78   Temp 97.6 °F (36.4 °C)   Ht 172.7 cm (68\")   Wt 67.1 kg (148 lb)   SpO2 99%   Breastfeeding No   BMI 22.50 kg/m²   Body mass index is 22.5 kg/m².    Physical Exam  Constitutional:       Appearance: She is well-developed.   HENT:      Head: Normocephalic and atraumatic.   Eyes:      Pupils: Pupils are equal, round, and reactive to light.   Neck:      Trachea: No tracheal deviation.   Cardiovascular:      Rate and Rhythm: Normal rate and regular rhythm.      Heart sounds: Normal heart sounds. No murmur heard.  No friction rub. No gallop.    Pulmonary:      Effort: Pulmonary effort is normal. No respiratory distress.      Breath sounds: Normal breath sounds. No wheezing or rales.   Chest:      Chest wall: No tenderness.   Abdominal:      General: Bowel sounds are normal. There is no distension.      Palpations: Abdomen is soft. Abdomen is not rigid.      Tenderness: There is no abdominal tenderness. There is no guarding or rebound.   Musculoskeletal:         General: No tenderness or deformity. Normal range of motion.      Cervical back: Normal range of motion and neck supple. "   Skin:     General: Skin is warm and dry.      Coloration: Skin is not pale.      Findings: No rash.   Neurological:      Mental Status: She is alert and oriented to person, place, and time.      Deep Tendon Reflexes: Reflexes are normal and symmetric.   Psychiatric:         Behavior: Behavior normal.         Thought Content: Thought content normal.         Judgment: Judgment normal.         ASSESSMENT AND PLAN    Patient's Body mass index is 22.5 kg/m². indicating that she is within normal range (BMI 18.5-24.9). No BMI management plan needed..    Diagnoses and all orders for this visit:    1. Diarrhea of presumed infectious origin  -     colestipol (COLESTID) 1 g tablet; Take 1 tablet by mouth 4 (Four) Times a Day.  Dispense: 360 tablet; Refill: 3      Continue diet modifications as needed.     There are no Patient Instructions on file for this visit.  Aga Hendrickson, APRN  13:57 CDT  10/28/2021    Obesity, Adult  Obesity is the condition of having too much total body fat. Being overweight or obese means that your weight is greater than what is considered healthy for your body size. Obesity is determined by a measurement called BMI. BMI is an estimate of body fat and is calculated from height and weight. For adults, a BMI of 30 or higher is considered obese.  Obesity can eventually lead to other health concerns and major illnesses, including:  · Stroke.  · Coronary artery disease (CAD).  · Type 2 diabetes.  · Some types of cancer, including cancers of the colon, breast, uterus, and gallbladder.  · Osteoarthritis.  · High blood pressure (hypertension).  · High cholesterol.  · Sleep apnea.  · Gallbladder stones.  · Infertility problems.  What are the causes?  The main cause of obesity is taking in (consuming) more calories than your body uses for energy. Other factors that contribute to this condition may include:  · Being born with genes that make you more likely to become obese.  · Having a medical condition  that causes obesity. These conditions include:  ¨ Hypothyroidism.  ¨ Polycystic ovarian syndrome (PCOS).  ¨ Binge-eating disorder.  ¨ Cushing syndrome.  · Taking certain medicines, such as steroids, antidepressants, and seizure medicines.  · Not being physically active (sedentary lifestyle).  · Living where there are limited places to exercise safely or buy healthy foods.  · Not getting enough sleep.  What increases the risk?  The following factors may increase your risk of this condition:  · Having a family history of obesity.  · Being a woman of -American descent.  · Being a man of  descent.  What are the signs or symptoms?  Having excessive body fat is the main symptom of this condition.  How is this diagnosed?  This condition may be diagnosed based on:  · Your symptoms.  · Your medical history.  · A physical exam. Your health care provider may measure:  ¨ Your BMI. If you are an adult with a BMI between 25 and less than 30, you are considered overweight. If you are an adult with a BMI of 30 or higher, you are considered obese.  ¨ The distances around your hips and your waist (circumferences). These may be compared to each other to help diagnose your condition.  ¨ Your skinfold thickness. Your health care provider may gently pinch a fold of your skin and measure it.  How is this treated?  Treatment for this condition often includes changing your lifestyle. Treatment may include some or all of the following:  · Dietary changes. Work with your health care provider and a dietitian to set a weight-loss goal that is healthy and reasonable for you. Dietary changes may include eating:  ¨ Smaller portions. A portion size is the amount of a particular food that is healthy for you to eat at one time. This varies from person to person.  ¨ Low-calorie or low-fat options.  ¨ More whole grains, fruits, and vegetables.  · Regular physical activity. This may include aerobic activity (cardio) and strength  training.  · Medicine to help you lose weight. Your health care provider may prescribe medicine if you are unable to lose 1 pound a week after 6 weeks of eating more healthily and doing more physical activity.  · Surgery. Surgical options may include gastric banding and gastric bypass. Surgery may be done if:  ¨ Other treatments have not helped to improve your condition.  ¨ You have a BMI of 40 or higher.  ¨ You have life-threatening health problems related to obesity.  Follow these instructions at home:     Eating and drinking     · Follow recommendations from your health care provider about what you eat and drink. Your health care provider may advise you to:  ¨ Limit fast foods, sweets, and processed snack foods.  ¨ Choose low-fat options, such as low-fat milk instead of whole milk.  ¨ Eat 5 or more servings of fruits or vegetables every day.  ¨ Eat at home more often. This gives you more control over what you eat.  ¨ Choose healthy foods when you eat out.  ¨ Learn what a healthy portion size is.  ¨ Keep low-fat snacks on hand.  ¨ Avoid sugary drinks, such as soda, fruit juice, iced tea sweetened with sugar, and flavored milk.  ¨ Eat a healthy breakfast.  · Drink enough water to keep your urine clear or pale yellow.  · Do not go without eating for long periods of time (do not fast) or follow a fad diet. Fasting and fad diets can be unhealthy and even dangerous.  Physical Activity   · Exercise regularly, as told by your health care provider. Ask your health care provider what types of exercise are safe for you and how often you should exercise.  · Warm up and stretch before being active.  · Cool down and stretch after being active.  · Rest between periods of activity.  Lifestyle   · Limit the time that you spend in front of your TV, computer, or video game system.  · Find ways to reward yourself that do not involve food.  · Limit alcohol intake to no more than 1 drink a day for nonpregnant women and 2 drinks a day  for men. One drink equals 12 oz of beer, 5 oz of wine, or 1½ oz of hard liquor.  General instructions   · Keep a weight loss journal to keep track of the food you eat and how much you exercise you get.  · Take over-the-counter and prescription medicines only as told by your health care provider.  · Take vitamins and supplements only as told by your health care provider.  · Consider joining a support group. Your health care provider may be able to recommend a support group.  · Keep all follow-up visits as told by your health care provider. This is important.  Contact a health care provider if:  · You are unable to meet your weight loss goal after 6 weeks of dietary and lifestyle changes.  This information is not intended to replace advice given to you by your health care provider. Make sure you discuss any questions you have with your health care provider.  Document Released: 01/25/2006 Document Revised: 05/22/2017 Document Reviewed: 10/05/2016  [employer] Interactive Patient Education © 2017 [employer] Inc.      IF YOU SMOKE OR USE TOBACCO PLEASE READ THE FOLLOWING:    Why is smoking bad for me?  Smoking increases the risk of heart disease, lung disease, vascular disease, stroke, and cancer.     If you smoke, STOP!    If you would like more information on quitting smoking, please visit the [employer] website: [email or web address]   This link will provide additional resources including the QUIT line and the [employer] support groups.     For more information:    Quit Now [place]  1-800-QUIT-NOW  https://[place].quitlogix.org/en-[place]/

## 2021-12-03 ENCOUNTER — OFFICE VISIT (OUTPATIENT)
Dept: OBGYN CLINIC | Age: 86
End: 2021-12-03
Payer: MEDICARE

## 2021-12-03 VITALS
WEIGHT: 148 LBS | DIASTOLIC BLOOD PRESSURE: 72 MMHG | HEIGHT: 68 IN | SYSTOLIC BLOOD PRESSURE: 123 MMHG | HEART RATE: 70 BPM | BODY MASS INDEX: 22.43 KG/M2

## 2021-12-03 DIAGNOSIS — R10.2 VAGINAL PAIN: ICD-10-CM

## 2021-12-03 DIAGNOSIS — N95.2 ATROPHIC VAGINITIS: Primary | ICD-10-CM

## 2021-12-03 PROCEDURE — G8427 DOCREV CUR MEDS BY ELIG CLIN: HCPCS | Performed by: NURSE PRACTITIONER

## 2021-12-03 PROCEDURE — G8420 CALC BMI NORM PARAMETERS: HCPCS | Performed by: NURSE PRACTITIONER

## 2021-12-03 PROCEDURE — G8484 FLU IMMUNIZE NO ADMIN: HCPCS | Performed by: NURSE PRACTITIONER

## 2021-12-03 PROCEDURE — 1090F PRES/ABSN URINE INCON ASSESS: CPT | Performed by: NURSE PRACTITIONER

## 2021-12-03 PROCEDURE — 1123F ACP DISCUSS/DSCN MKR DOCD: CPT | Performed by: NURSE PRACTITIONER

## 2021-12-03 PROCEDURE — 4040F PNEUMOC VAC/ADMIN/RCVD: CPT | Performed by: NURSE PRACTITIONER

## 2021-12-03 PROCEDURE — 99213 OFFICE O/P EST LOW 20 MIN: CPT | Performed by: NURSE PRACTITIONER

## 2021-12-03 PROCEDURE — 1036F TOBACCO NON-USER: CPT | Performed by: NURSE PRACTITIONER

## 2021-12-03 RX ORDER — MONTELUKAST SODIUM 4 MG/1
1 TABLET, CHEWABLE ORAL 4 TIMES DAILY
COMMUNITY

## 2021-12-03 ASSESSMENT — ENCOUNTER SYMPTOMS
RESPIRATORY NEGATIVE: 1
GASTROINTESTINAL NEGATIVE: 1
CONSTIPATION: 0
EYES NEGATIVE: 1
ALLERGIC/IMMUNOLOGIC NEGATIVE: 1
DIARRHEA: 0

## 2021-12-03 NOTE — PROGRESS NOTES
Pt is here due to vaginal irritation and has been going on for 2 weeks. Pt is wanting to discuss the vagifem. When she does not use she is very irritated.

## 2021-12-03 NOTE — PROGRESS NOTES
Gypsy Bermeo is a 80 y.o. female who presents today for her medical conditions/ complaints as noted below. Gypsy Bermeo is c/o of Vaginitis (atrophic)        HPI  Pt presents c/o increase in vulvar irritation. Has been using steroid topical about twice a week. Uses vaginal estrogen with good relief of vaginal irritation. Had biopsy benign in 2019. No LMP recorded. Patient has had a hysterectomy.     Past Medical History:   Diagnosis Date    Cystic disease of breast     Heart murmur     Dr. Mary Jose Hypertension     Status post placement of implantable loop recorder 2017    Type II or unspecified type diabetes mellitus without mention of complication, not stated as uncontrolled     borderline     Past Surgical History:   Procedure Laterality Date    APPENDECTOMY      BREAST SURGERY      biopsy x3 benign    CHOLECYSTECTOMY      2020    COLONOSCOPY      HERNIA REPAIR      HYSTERECTOMY, TOTAL ABDOMINAL      Dr. Summer Tristan       Family History   Problem Relation Age of Onset    Heart Disease Father      Social History     Tobacco Use    Smoking status: Never Smoker    Smokeless tobacco: Never Used   Substance Use Topics    Alcohol use: Yes     Comment: small glass wine daily       Current Outpatient Medications   Medication Sig Dispense Refill    colestipol (COLESTID) 1 g tablet Take 1 g by mouth 4 times daily      PREMARIN 0.625 MG/GM vaginal cream INSERT 0.5 GRAMS VAGINALLY TWICE A WEEK 30 g 5    Estradiol (VAGIFEM) 10 MCG TABS vaginal tablet INSERT 1 TABLET VAGINALLY TWICE A WEEK 8 tablet 11    oxybutynin (DITROPAN XL) 5 MG extended release tablet Take 1 tablet by mouth daily 30 tablet 3    amLODIPine (NORVASC) 2.5 MG tablet 2.5 mg      clobetasol (TEMOVATE) 0.05 % ointment Apply topically prn 1 Tube 0    donepezil (ARICEPT) 5 MG tablet Take 10 mg by mouth nightly       pregabalin (LYRICA) 150 MG capsule Take 100 mg by mouth 3 times daily.        Calcium Carbonate Antacid (TUMS PO) Take 400 mg by mouth daily PT TAKES 2 400 MG TABLETS DAILY      Potassium 99 MG TABS Take 99 mg by mouth daily      Biotin 1000 MCG TABS Take 5,000 mcg by mouth daily      Probiotic Product (PROBIOTIC DAILY PO) Take 1 tablet by mouth daily      Cholecalciferol (VITAMIN D3) 1000 units TABS Take 1 tablet by mouth daily      zolpidem (AMBIEN) 5 MG tablet Take 5 mg by mouth as needed for Sleep      vitamin B-12 (CYANOCOBALAMIN) 100 MCG tablet Take 100 mcg by mouth daily        No current facility-administered medications for this visit. Allergies   Allergen Reactions    Penicillins Nausea And Vomiting    Quinine Derivatives Nausea And Vomiting    Tizanidine      Weakness, fever    Sulfa Antibiotics Rash     Vitals:    12/03/21 1324   BP: 123/72   Pulse: 70     Body mass index is 22.5 kg/m². Review of Systems   Constitutional: Negative. HENT: Negative. Eyes: Negative. Respiratory: Negative. Cardiovascular: Negative. Gastrointestinal: Negative. Negative for constipation and diarrhea. Endocrine: Negative. Genitourinary: Positive for genital sores. Negative for frequency, menstrual problem and urgency. Musculoskeletal: Negative. Skin: Negative. Allergic/Immunologic: Negative. Neurological: Negative. Hematological: Negative. Psychiatric/Behavioral: Negative. All other systems reviewed and are negative. Physical Exam  Vitals and nursing note reviewed. Constitutional:       Appearance: She is well-developed. HENT:      Head: Normocephalic. Right Ear: External ear normal.      Left Ear: External ear normal.      Nose: Nose normal.   Genitourinary:         Comments: Small erythematic excoriated area on right labia majora  Biopsy proven benign  Musculoskeletal:         General: Normal range of motion. Cervical back: Normal range of motion. Skin:     General: Skin is warm and dry.    Neurological:      Mental Status: She is douche. · Having sex improves blood flow to the vagina. This helps keep your tissue healthy. When should you call for help? Watch closely for changes in your health, and be sure to contact your doctor if:    · You have unexpected vaginal bleeding.     · You do not get better as expected. Where can you learn more? Go to [email or web address] and sign in to your [employer] [facility] account. Enter R330 in the [employer] box to learn more about \"Atrophic Vaginitis: Care Instructions. \"     If you do not have an account, please click on the \"Sign Up Now\" link. Current as of: February 11, 2021               Content Version: 13.0  © 2006-2021 Healthwise, Incorporated. Care instructions adapted under license by [facility] ([facility]). If you have questions about a medical condition or this instruction, always ask your healthcare professional. [address] any warranty or liability for your use of this information.

## 2022-01-24 ENCOUNTER — TELEPHONE (OUTPATIENT)
Dept: GASTROENTEROLOGY | Facility: CLINIC | Age: 87
End: 2022-01-24

## 2022-01-24 DIAGNOSIS — R19.7 DIARRHEA OF PRESUMED INFECTIOUS ORIGIN: Primary | ICD-10-CM

## 2022-01-24 NOTE — TELEPHONE ENCOUNTER
Patient called stating she is having very loose stools again and needs you to call her in some medicine as soon as possible

## 2022-01-24 NOTE — TELEPHONE ENCOUNTER
Called her and stools ordered. No dairy. No sweeteners  Continue colestid  Call with any worsening symptoms.   Aga Hendrickson

## 2022-01-25 ENCOUNTER — LAB (OUTPATIENT)
Dept: LAB | Facility: HOSPITAL | Age: 87
End: 2022-01-25

## 2022-01-25 DIAGNOSIS — R19.7 DIARRHEA OF PRESUMED INFECTIOUS ORIGIN: ICD-10-CM

## 2022-01-25 LAB
ADV 40+41 DNA STL QL NAA+NON-PROBE: NOT DETECTED
ASTRO TYP 1-8 RNA STL QL NAA+NON-PROBE: NOT DETECTED
C CAYETANENSIS DNA STL QL NAA+NON-PROBE: NOT DETECTED
C COLI+JEJ+UPSA DNA STL QL NAA+NON-PROBE: NOT DETECTED
C DIFF TOX GENS STL QL NAA+PROBE: NEGATIVE
CRYPTOSP DNA STL QL NAA+NON-PROBE: NOT DETECTED
E HISTOLYT DNA STL QL NAA+NON-PROBE: NOT DETECTED
EAEC PAA PLAS AGGR+AATA ST NAA+NON-PRB: NOT DETECTED
EC STX1+STX2 GENES STL QL NAA+NON-PROBE: NOT DETECTED
EPEC EAE GENE STL QL NAA+NON-PROBE: NOT DETECTED
ETEC LTA+ST1A+ST1B TOX ST NAA+NON-PROBE: NOT DETECTED
G LAMBLIA DNA STL QL NAA+NON-PROBE: NOT DETECTED
NOROVIRUS GI+II RNA STL QL NAA+NON-PROBE: NOT DETECTED
P SHIGELLOIDES DNA STL QL NAA+NON-PROBE: NOT DETECTED
RVA RNA STL QL NAA+NON-PROBE: NOT DETECTED
S ENT+BONG DNA STL QL NAA+NON-PROBE: NOT DETECTED
SAPO I+II+IV+V RNA STL QL NAA+NON-PROBE: NOT DETECTED
SHIGELLA SP+EIEC IPAH ST NAA+NON-PROBE: NOT DETECTED
V CHOL+PARA+VUL DNA STL QL NAA+NON-PROBE: NOT DETECTED
V CHOLERAE DNA STL QL NAA+NON-PROBE: NOT DETECTED
Y ENTEROCOL DNA STL QL NAA+NON-PROBE: NOT DETECTED

## 2022-01-25 PROCEDURE — 87209 SMEAR COMPLEX STAIN: CPT | Performed by: CLINICAL NURSE SPECIALIST

## 2022-01-25 PROCEDURE — 87102 FUNGUS ISOLATION CULTURE: CPT

## 2022-01-25 PROCEDURE — 83630 LACTOFERRIN FECAL (QUAL): CPT

## 2022-01-25 PROCEDURE — 0097U HC BIOFIRE FILMARRAY GI PANEL: CPT | Performed by: CLINICAL NURSE SPECIALIST

## 2022-01-25 PROCEDURE — 87177 OVA AND PARASITES SMEARS: CPT | Performed by: CLINICAL NURSE SPECIALIST

## 2022-01-25 PROCEDURE — 87493 C DIFF AMPLIFIED PROBE: CPT | Performed by: CLINICAL NURSE SPECIALIST

## 2022-01-27 LAB
LACTOFERRIN STL QL LA: POSITIVE
O+P SPEC MICRO: NORMAL
O+P STL CONC: NORMAL

## 2022-02-01 ENCOUNTER — OFFICE VISIT (OUTPATIENT)
Dept: GASTROENTEROLOGY | Facility: CLINIC | Age: 87
End: 2022-02-01

## 2022-02-01 VITALS
BODY MASS INDEX: 22.73 KG/M2 | SYSTOLIC BLOOD PRESSURE: 120 MMHG | TEMPERATURE: 97.3 F | OXYGEN SATURATION: 86 % | HEART RATE: 73 BPM | HEIGHT: 68 IN | WEIGHT: 150 LBS | DIASTOLIC BLOOD PRESSURE: 60 MMHG

## 2022-02-01 DIAGNOSIS — E66.9 OBESITY, UNSPECIFIED OBESITY SEVERITY, UNSPECIFIED OBESITY TYPE: ICD-10-CM

## 2022-02-01 DIAGNOSIS — R19.7 DIARRHEA OF PRESUMED INFECTIOUS ORIGIN: Primary | ICD-10-CM

## 2022-02-01 DIAGNOSIS — Z78.9 NONSMOKER: ICD-10-CM

## 2022-02-01 PROCEDURE — 99214 OFFICE O/P EST MOD 30 MIN: CPT | Performed by: CLINICAL NURSE SPECIALIST

## 2022-02-01 RX ORDER — FLUCONAZOLE 150 MG/1
150 TABLET ORAL DAILY
Qty: 5 TABLET | Refills: 0 | Status: SHIPPED | OUTPATIENT
Start: 2022-02-01 | End: 2022-02-06

## 2022-02-01 RX ORDER — FLUCONAZOLE 100 MG/1
150 TABLET ORAL DAILY
Qty: 5 TABLET | Refills: 0 | Status: SHIPPED | OUTPATIENT
Start: 2022-02-01 | End: 2022-02-01 | Stop reason: SDUPTHER

## 2022-02-01 NOTE — PROGRESS NOTES
Hannah Dasilva  11/27/1929 2/1/2022  Chief Complaint   Patient presents with   • GI Problem     diarrhea     Subjective   HPI  Hannah Dasilva is a 89 y.o. female who presents with a complaint of worsening diarrhea she says she is having some incontinence of stool at night having to use a pad. She is on antibiotics at this time for a UTI which she just started yesterday. No bleeding. No fever.  She has cultures and had 4+ yeast in her stool. We discussed this.   Past Medical History:   Diagnosis Date   • Diabetes mellitus (HCC)    • Disc degeneration, lumbar    • History of adenomatous polyp of colon    • Hypercholesterolemia    • Irregular heartbeat    • LAFB (left anterior fascicular block) 10/26/2018   • MVP (mitral valve prolapse)    • Osteoarthritis    • Sciatica      Past Surgical History:   Procedure Laterality Date   • APPENDECTOMY     • BLADDER REPAIR     • CHOLECYSTECTOMY WITH INTRAOPERATIVE CHOLANGIOGRAM N/A 3/31/2020    Procedure: CHOLECYSTECTOMY LAPAROSCOPIC INTRAOPERATIVE CHOLANGIOGRAM;  Surgeon: Jenna Kinney MD;  Location: [facility];  Service: General;  Laterality: N/A;   • COLONOSCOPY  02/23/2010    diverticulosis   • COLONOSCOPY W/ POLYPECTOMY  11/06/2006    ASCENDING COLON ADENOMATOUS POLYP   • ENDOSCOPY  10/04/2006    SBBX UNREMARKABLE, SMALL HH, UREA NEG   • HYSTERECTOMY     • TONSILLECTOMY         Outpatient Medications Marked as Taking for the 2/1/22 encounter (Office Visit) with Aga Hendrickson APRN   Medication Sig Dispense Refill   • acetaminophen (Tylenol) 325 MG tablet Take 2 tablets by mouth Every 6 (Six) Hours As Needed for Mild Pain .     • amLODIPine (NORVASC) 5 MG tablet Take 1 tablet by mouth Daily. 30 tablet 2   • B Complex Vitamins (B COMPLEX 50 PO) Take 1 tablet by mouth Daily.     • BIOTIN 5000 PO Take 1 tablet by mouth Daily.     • Calcium Carbonate-Vit D-Min (CALTRATE 600+D PLUS PO) Take 1 tablet by mouth Daily.     • Cholecalciferol (VITAMIN D3) 5000 units  capsule capsule Take 5,000 Units by mouth Daily.     • colestipol (COLESTID) 1 g tablet Take 1 tablet by mouth 4 (Four) Times a Day. 360 tablet 3   • donepezil (ARICEPT) 10 MG tablet Take 10 mg by mouth Every Night.     • Magnesium 250 MG tablet Take  by mouth 2 (two) times a day.     • metoprolol tartrate (LOPRESSOR) 25 MG tablet Take 25 mg by mouth. 1/2 tablet twice a day     • Multiple Vitamins-Minerals (MULTIPLE VITAMINS/WOMENS PO) Take 1 tablet by mouth Daily.     • oxybutynin (DITROPAN) 5 MG tablet Take 5 mg by mouth 3 (Three) Times a Day.     • pregabalin (LYRICA) 75 MG capsule Take 75 mg by mouth 4 (Four) Times a Day.     • Psyllium (METAMUCIL FIBER PO) Take 1 package by mouth 2 (Two) Times a Day.     • zolpidem (Ambien) 10 MG tablet Take 10 mg by mouth At Night As Needed for Sleep. PT takes 5-10 mg at night       Allergies   Allergen Reactions   • Penicillins Nausea And Vomiting   • Quinine Derivatives Nausea And Vomiting   • Tizanidine Other (See Comments)     Weakness, fever   • Sulfa Antibiotics Rash     Social History     Socioeconomic History   • Marital status:    Tobacco Use   • Smoking status: Never Smoker   • Smokeless tobacco: Never Used   Substance and Sexual Activity   • Alcohol use: Yes     Comment: wine occasional   • Drug use: No     Family History   Problem Relation Age of Onset   • Colon polyps Son    • Stroke Mother    • Heart attack Father    • GI problems Neg Hx    • Colon cancer Neg Hx      Health Maintenance   Topic Date Due   • URINE MICROALBUMIN  Never done   • DXA SCAN  Never done   • Pneumococcal Vaccine 65+ (1 of 2 - PPSV23) Never done   • ZOSTER VACCINE (1 of 2) Never done   • TDAP/TD VACCINES (2 - Tdap) 10/15/2011   • ANNUAL WELLNESS VISIT  05/09/2020   • HEMOGLOBIN A1C  09/30/2020   • DIABETIC EYE EXAM  09/30/2020   • LIPID PANEL  03/31/2021   • COVID-19 Vaccine (3 - Booster for Pfizer series) 07/10/2021   • INFLUENZA VACCINE  08/01/2021     Review of Systems  "  Constitutional: Negative for activity change, appetite change, chills, diaphoresis, fatigue, fever and unexpected weight change.   HENT: Negative for ear pain, hearing loss, mouth sores, sore throat, trouble swallowing and voice change.    Eyes: Negative.    Respiratory: Negative for cough, choking, shortness of breath and wheezing.    Cardiovascular: Negative for chest pain and palpitations.   Gastrointestinal: Negative for abdominal pain, blood in stool, constipation, diarrhea, nausea and vomiting.   Endocrine: Negative for cold intolerance and heat intolerance.   Genitourinary: Negative for decreased urine volume, dysuria, frequency, hematuria and urgency.   Musculoskeletal: Negative for back pain, gait problem and myalgias.   Skin: Negative for color change, pallor and rash.   Allergic/Immunologic: Negative for food allergies and immunocompromised state.   Neurological: Negative for dizziness, tremors, seizures, syncope, weakness, light-headedness, numbness and headaches.   Hematological: Negative for adenopathy. Does not bruise/bleed easily.   Psychiatric/Behavioral: Negative for agitation and confusion. The patient is not nervous/anxious.    All other systems reviewed and are negative.    Objective   Vitals:    02/01/22 1340   BP: 120/60   Pulse: 73   Temp: 97.3 °F (36.3 °C)   SpO2: (!) 86%   Weight: 68 kg (150 lb)   Height: 172.7 cm (67.99\")     Body mass index is 22.81 kg/m².  Physical Exam  Constitutional:       Appearance: She is well-developed.   HENT:      Head: Normocephalic and atraumatic.   Eyes:      Pupils: Pupils are equal, round, and reactive to light.   Neck:      Trachea: No tracheal deviation.   Cardiovascular:      Rate and Rhythm: Normal rate and regular rhythm.      Heart sounds: Normal heart sounds. No murmur heard.  No friction rub. No gallop.    Pulmonary:      Effort: Pulmonary effort is normal. No respiratory distress.      Breath sounds: Normal breath sounds. No wheezing or rales. "   Chest:      Chest wall: No tenderness.   Abdominal:      General: Bowel sounds are normal. There is no distension.      Palpations: Abdomen is soft. Abdomen is not rigid.      Tenderness: There is no abdominal tenderness. There is no guarding or rebound.   Musculoskeletal:         General: No tenderness or deformity. Normal range of motion.      Cervical back: Normal range of motion and neck supple.   Skin:     General: Skin is warm and dry.      Coloration: Skin is not pale.      Findings: No rash.   Neurological:      Mental Status: She is alert and oriented to person, place, and time.      Deep Tendon Reflexes: Reflexes are normal and symmetric.   Psychiatric:         Behavior: Behavior normal.         Thought Content: Thought content normal.         Judgment: Judgment normal.       Assessment/Plan   Diagnoses and all orders for this visit:    1. Diarrhea of presumed infectious origin (Primary)    2. Nonsmoker    3. Obesity, unspecified obesity severity, unspecified obesity type    Other orders  -     Discontinue: fluconazole (Diflucan) 100 MG tablet; Take 1.5 tablets by mouth Daily.  Dispense: 5 tablet; Refill: 0  -     fluconazole (Diflucan) 150 MG tablet; Take 1 tablet by mouth Daily for 5 days.  Dispense: 5 tablet; Refill: 0    all other cultures reviewed and negative  She is to take when she completes her antibiotics for UTI    Part of this note may be an electronic transcription/translation of spoken language to printed text using the Dragon Dictation System.  Body mass index is 22.81 kg/m².  Return in about 4 weeks (around 3/1/2022).    Patient's Body mass index is 22.81 kg/m². indicating that she is within normal range (BMI 18.5-24.9). No BMI management plan needed..      All risks, benefits, alternatives, and indications of colonoscopy and/or Endoscopy procedure have been discussed with the patient. Risks to include perforation of the colon requiring possible surgery or colostomy, risk of bleeding from  biopsies or removal of colon tissue, possibility of missing a colon polyp or cancer, or adverse drug reaction.  Benefits to include the diagnosis and management of disease of the colon and rectum. Alternatives to include barium enema, radiographic evaluation, lab testing or no intervention. Pt verbalizes understanding and agrees.     Aga Hutson Emeka, APRN  2/1/2022  14:05 CST          If you smoke or use tobacco, 4 minutes reading provided  Steps to Quit Smoking  Smoking tobacco can be harmful to your health and can affect almost every organ in your body. Smoking puts you, and those around you, at risk for developing many serious chronic diseases. Quitting smoking is difficult, but it is one of the best things that you can do for your health. It is never too late to quit.  What are the benefits of quitting smoking?  When you quit smoking, you lower your risk of developing serious diseases and conditions, such as:  · Lung cancer or lung disease, such as COPD.  · Heart disease.  · Stroke.  · Heart attack.  · Infertility.  · Osteoporosis and bone fractures.  Additionally, symptoms such as coughing, wheezing, and shortness of breath may get better when you quit. You may also find that you get sick less often because your body is stronger at fighting off colds and infections. If you are pregnant, quitting smoking can help to reduce your chances of having a baby of low birth weight.  How do I get ready to quit?  When you decide to quit smoking, create a plan to make sure that you are successful. Before you quit:  · Pick a date to quit. Set a date within the next two weeks to give you time to prepare.  · Write down the reasons why you are quitting. Keep this list in places where you will see it often, such as on your bathroom mirror or in your car or wallet.  · Identify the people, places, things, and activities that make you want to smoke (triggers) and avoid them. Make sure to take these actions:  ¨ Throw away all  cigarettes at home, at work, and in your car.  ¨ Throw away smoking accessories, such as ashtrays and lighters.  ¨ Clean your car and make sure to empty the ashtray.  ¨ Clean your home, including curtains and carpets.  · Tell your family, friends, and coworkers that you are quitting. Support from your loved ones can make quitting easier.  · Talk with your health care provider about your options for quitting smoking.  · Find out what treatment options are covered by your health insurance.  What strategies can I use to quit smoking?  Talk with your healthcare provider about different strategies to quit smoking. Some strategies include:  · Quitting smoking altogether instead of gradually lessening how much you smoke over a period of time. Research shows that quitting “cold turkey” is more successful than gradually quitting.  · Attending in-person counseling to help you build problem-solving skills. You are more likely to have success in quitting if you attend several counseling sessions. Even short sessions of 10 minutes can be effective.  · Finding resources and support systems that can help you to quit smoking and remain smoke-free after you quit. These resources are most helpful when you use them often. They can include:  ¨ Online chats with a counselor.  ¨ Telephone quitlines.  ¨ Printed self-help materials.  ¨ Support groups or group counseling.  ¨ Text messaging programs.  ¨ Mobile phone applications.  · Taking medicines to help you quit smoking. (If you are pregnant or breastfeeding, talk with your health care provider first.) Some medicines contain nicotine and some do not. Both types of medicines help with cravings, but the medicines that include nicotine help to relieve withdrawal symptoms. Your health care provider may recommend:  ¨ Nicotine patches, gum, or lozenges.  ¨ Nicotine inhalers or sprays.  ¨ Non-nicotine medicine that is taken by mouth.  Talk with your health care provider about combining  strategies, such as taking medicines while you are also receiving in-person counseling. Using these two strategies together makes you more likely to succeed in quitting than if you used either strategy on its own.  If you are pregnant or breastfeeding, talk with your health care provider about finding counseling or other support strategies to quit smoking. Do not take medicine to help you quit smoking unless told to do so by your health care provider.  What things can I do to make it easier to quit?  Quitting smoking might feel overwhelming at first, but there is a lot that you can do to make it easier. Take these important actions:  · Reach out to your family and friends and ask that they support and encourage you during this time. Call telephone quitlines, reach out to support groups, or work with a counselor for support.  · Ask people who smoke to avoid smoking around you.  · Avoid places that trigger you to smoke, such as bars, parties, or smoke-break areas at work.  · Spend time around people who do not smoke.  · Lessen stress in your life, because stress can be a smoking trigger for some people. To lessen stress, try:  ¨ Exercising regularly.  ¨ Deep-breathing exercises.  ¨ Yoga.  ¨ Meditating.  ¨ Performing a body scan. This involves closing your eyes, scanning your body from head to toe, and noticing which parts of your body are particularly tense. Purposefully relax the muscles in those areas.  · Download or purchase mobile phone or tablet apps (applications) that can help you stick to your quit plan by providing reminders, tips, and encouragement. There are many free apps, such as QuitGuide from the CDC (Centers for Disease Control and Prevention). You can find other support for quitting smoking (smoking cessation) through smokefree.gov and other websites.  How will I feel when I quit smoking?  Within the first 24 hours of quitting smoking, you may start to feel some withdrawal symptoms. These symptoms are  usually most noticeable 2-3 days after quitting, but they usually do not last beyond 2-3 weeks. Changes or symptoms that you might experience include:  · Mood swings.  · Restlessness, anxiety, or irritation.  · Difficulty concentrating.  · Dizziness.  · Strong cravings for sugary foods in addition to nicotine.  · Mild weight gain.  · Constipation.  · Nausea.  · Coughing or a sore throat.  · Changes in how your medicines work in your body.  · A depressed mood.  · Difficulty sleeping (insomnia).  After the first 2-3 weeks of quitting, you may start to notice more positive results, such as:  · Improved sense of smell and taste.  · Decreased coughing and sore throat.  · Slower heart rate.  · Lower blood pressure.  · Clearer skin.  · The ability to breathe more easily.  · Fewer sick days.  Quitting smoking is very challenging for most people. Do not get discouraged if you are not successful the first time. Some people need to make many attempts to quit before they achieve long-term success. Do your best to stick to your quit plan, and talk with your health care provider if you have any questions or concerns.  This information is not intended to replace advice given to you by your health care provider. Make sure you discuss any questions you have with your health care provider.  Document Released: 12/12/2002 Document Revised: 08/15/2017 Document Reviewed: 05/03/2016  [employer] Interactive Patient Education © 2017 [employer]

## 2022-02-10 ENCOUNTER — TELEPHONE (OUTPATIENT)
Dept: GASTROENTEROLOGY | Facility: CLINIC | Age: 87
End: 2022-02-10

## 2022-02-10 NOTE — TELEPHONE ENCOUNTER
Patient called requesting to speak with you about the Fluconazole you put her on she states she finished it and think she needs more still not much better.  [phone]

## 2022-02-10 NOTE — TELEPHONE ENCOUNTER
I called her. Explained that the Diflucan was for her colon. She is taking Colestid. She is having 2 loose stools per day. Otherwise make her a follow up with me. No dairy products or sweeteners. Aga Hendrickson

## 2022-03-07 LAB — FUNGUS WND CULT: ABNORMAL

## 2022-03-14 ENCOUNTER — OFFICE VISIT (OUTPATIENT)
Dept: GASTROENTEROLOGY | Facility: CLINIC | Age: 87
End: 2022-03-14

## 2022-03-14 VITALS
BODY MASS INDEX: 22.46 KG/M2 | HEIGHT: 68 IN | DIASTOLIC BLOOD PRESSURE: 70 MMHG | HEART RATE: 71 BPM | SYSTOLIC BLOOD PRESSURE: 140 MMHG | OXYGEN SATURATION: 96 % | TEMPERATURE: 96.2 F | WEIGHT: 148.2 LBS

## 2022-03-14 DIAGNOSIS — R19.7 DIARRHEA OF PRESUMED INFECTIOUS ORIGIN: Primary | ICD-10-CM

## 2022-03-14 DIAGNOSIS — Z78.9 NONSMOKER: ICD-10-CM

## 2022-03-14 PROCEDURE — 99213 OFFICE O/P EST LOW 20 MIN: CPT | Performed by: CLINICAL NURSE SPECIALIST

## 2022-03-14 NOTE — PROGRESS NOTES
Hannah Dasilva  11/27/1929    3/14/2022  Chief Complaint   Patient presents with   • GI Problem     diarrhea     Subjective   HPI  Hannah Dasilva is a 89 y.o. female who presents with a complaint of diarrhea that is now resolved with colestid and fiber regimen. She has made some dietary modifications as well. She has some questions today regarding fiber and we have discussed this today. No bleeding. No wt loss. No new associated symptoms.    Past Medical History:   Diagnosis Date   • Diabetes mellitus (HCC)    • Disc degeneration, lumbar    • Diverticulosis    • Family history of colonic polyps    • History of adenomatous polyp of colon    • Hypercholesterolemia    • Irregular heartbeat    • LAFB (left anterior fascicular block) 10/26/2018   • MVP (mitral valve prolapse)    • Osteoarthritis    • Sciatica      Past Surgical History:   Procedure Laterality Date   • APPENDECTOMY     • BLADDER REPAIR     • CHOLECYSTECTOMY WITH INTRAOPERATIVE CHOLANGIOGRAM N/A 3/31/2020    Procedure: CHOLECYSTECTOMY LAPAROSCOPIC INTRAOPERATIVE CHOLANGIOGRAM;  Surgeon: Jenna Kinney MD;  Location: [facility] OR;  Service: General;  Laterality: N/A;   • COLONOSCOPY  02/23/2010    diverticulosis   • COLONOSCOPY W/ POLYPECTOMY  11/06/2006    ASCENDING COLON ADENOMATOUS POLYP   • ENDOSCOPY  10/04/2006    SBBX UNREMARKABLE, SMALL HH, UREA NEG   • HYSTERECTOMY     • TONSILLECTOMY         Outpatient Medications Marked as Taking for the 3/14/22 encounter (Office Visit) with Aga Hendrickson APRN   Medication Sig Dispense Refill   • acetaminophen (Tylenol) 325 MG tablet Take 2 tablets by mouth Every 6 (Six) Hours As Needed for Mild Pain .     • amLODIPine (NORVASC) 5 MG tablet Take 1 tablet by mouth Daily. 30 tablet 2   • B Complex Vitamins (B COMPLEX 50 PO) Take 1 tablet by mouth Daily.     • BIOTIN 5000 PO Take 1 tablet by mouth Daily.     • Calcium Carbonate-Vit D-Min (CALTRATE 600+D PLUS PO) Take 1 tablet by mouth Daily.     •  Cholecalciferol (VITAMIN D3) 5000 units capsule capsule Take 5,000 Units by mouth Daily.     • colestipol (COLESTID) 1 g tablet Take 1 tablet by mouth 4 (Four) Times a Day. 360 tablet 3   • donepezil (ARICEPT) 10 MG tablet Take 10 mg by mouth Every Night.     • Magnesium 250 MG tablet Take  by mouth 2 (two) times a day.     • metoprolol tartrate (LOPRESSOR) 25 MG tablet Take 25 mg by mouth. 1/2 tablet twice a day     • Multiple Vitamins-Minerals (MULTIPLE VITAMINS/WOMENS PO) Take 1 tablet by mouth Daily.     • oxybutynin (DITROPAN) 5 MG tablet Take 5 mg by mouth 3 (Three) Times a Day.     • pregabalin (LYRICA) 75 MG capsule Take 75 mg by mouth 4 (Four) Times a Day.     • Psyllium (METAMUCIL FIBER PO) Take 1 package by mouth 2 (Two) Times a Day.     • zolpidem (AMBIEN) 10 MG tablet Take 10 mg by mouth At Night As Needed for Sleep. PT takes 5-10 mg at night       Allergies   Allergen Reactions   • Penicillins Nausea And Vomiting   • Quinine Derivatives Nausea And Vomiting   • Tizanidine Other (See Comments)     Weakness, fever   • Sulfa Antibiotics Rash     Social History     Socioeconomic History   • Marital status:    Tobacco Use   • Smoking status: Never Smoker   • Smokeless tobacco: Never Used   Substance and Sexual Activity   • Alcohol use: Yes     Comment: wine occasional   • Drug use: No     Family History   Problem Relation Age of Onset   • Colon polyps Son    • Stroke Mother    • Heart attack Father    • GI problems Neg Hx    • Colon cancer Neg Hx      Health Maintenance   Topic Date Due   • URINE MICROALBUMIN  Never done   • DXA SCAN  Never done   • ZOSTER VACCINE (1 of 2) Never done   • Pneumococcal Vaccine 65+ (1 of 1 - PPSV23) Never done   • TDAP/TD VACCINES (2 - Tdap) 10/15/2011   • ANNUAL WELLNESS VISIT  05/09/2020   • HEMOGLOBIN A1C  09/30/2020   • DIABETIC EYE EXAM  09/30/2020   • LIPID PANEL  03/31/2021   • COVID-19 Vaccine (3 - Booster for Pfizer series) 07/10/2021   • INFLUENZA VACCINE   "08/01/2021     Review of Systems   Constitutional: Negative for activity change, appetite change, chills, diaphoresis, fatigue, fever and unexpected weight change.   HENT: Negative for ear pain, hearing loss, mouth sores, sore throat, trouble swallowing and voice change.    Eyes: Negative.    Respiratory: Negative for cough, choking, shortness of breath and wheezing.    Cardiovascular: Negative for chest pain and palpitations.   Gastrointestinal: Negative for abdominal pain, blood in stool, constipation, diarrhea, nausea and vomiting.   Endocrine: Negative for cold intolerance and heat intolerance.   Genitourinary: Negative for decreased urine volume, dysuria, frequency, hematuria and urgency.   Musculoskeletal: Negative for back pain, gait problem and myalgias.   Skin: Negative for color change, pallor and rash.   Allergic/Immunologic: Negative for food allergies and immunocompromised state.   Neurological: Negative for dizziness, tremors, seizures, syncope, weakness, light-headedness, numbness and headaches.   Hematological: Negative for adenopathy. Does not bruise/bleed easily.   Psychiatric/Behavioral: Negative for agitation and confusion. The patient is not nervous/anxious.    All other systems reviewed and are negative.    Objective   Vitals:    03/14/22 1324   BP: 140/70   Pulse: 71   Temp: 96.2 °F (35.7 °C)   SpO2: 96%   Weight: 67.2 kg (148 lb 3.2 oz)   Height: 172.7 cm (68\")     Body mass index is 22.53 kg/m².  Physical Exam  Constitutional:       Appearance: She is well-developed.   HENT:      Head: Normocephalic and atraumatic.   Eyes:      Pupils: Pupils are equal, round, and reactive to light.   Neck:      Trachea: No tracheal deviation.   Cardiovascular:      Rate and Rhythm: Normal rate and regular rhythm.      Heart sounds: Normal heart sounds. No murmur heard.    No friction rub. No gallop.   Pulmonary:      Effort: Pulmonary effort is normal. No respiratory distress.      Breath sounds: Normal " breath sounds. No wheezing or rales.   Chest:      Chest wall: No tenderness.   Abdominal:      General: Bowel sounds are normal. There is no distension.      Palpations: Abdomen is soft. Abdomen is not rigid.      Tenderness: There is no abdominal tenderness. There is no guarding or rebound.   Musculoskeletal:         General: No tenderness or deformity. Normal range of motion.      Cervical back: Normal range of motion and neck supple.   Skin:     General: Skin is warm and dry.      Coloration: Skin is not pale.      Findings: No rash.   Neurological:      Mental Status: She is alert and oriented to person, place, and time.      Deep Tendon Reflexes: Reflexes are normal and symmetric.   Psychiatric:         Behavior: Behavior normal.         Thought Content: Thought content normal.         Judgment: Judgment normal.       Assessment/Plan   Diagnoses and all orders for this visit:    1. Diarrhea of presumed infectious origin (Primary)    2. Nonsmoker    diarrhea has resolved  She has been treated with Diflucan for yeast as well as Colestid.   She is overall doing well. No new complaints.     We discussed again a colonoscopy and the R/B/I/A and she does not desire one at this time given her age. She is aware that I cannot rule out underlying finding that could be contributing to her bowels to include colitis, and even colon cancer.   All questions answered.   To follow up with me as needed.   Part of this note may be an electronic transcription/translation of spoken language to printed text using the Dragon Dictation System.  Body mass index is 22.53 kg/m².  No follow-ups on file.    Patient's Body mass index is 22.53 kg/m². indicating that she is within normal range (BMI 18.5-24.9). No BMI management plan needed..      All risks, benefits, alternatives, and indications of colonoscopy and/or Endoscopy procedure have been discussed with the patient. Risks to include perforation of the colon requiring possible surgery  or colostomy, risk of bleeding from biopsies or removal of colon tissue, possibility of missing a colon polyp or cancer, or adverse drug reaction.  Benefits to include the diagnosis and management of disease of the colon and rectum. Alternatives to include barium enema, radiographic evaluation, lab testing or no intervention. Pt verbalizes understanding and agrees.     Aga Hutson Emeka, APRN  3/14/2022  14:06 CDT          If you smoke or use tobacco, 4 minutes reading provided  Steps to Quit Smoking  Smoking tobacco can be harmful to your health and can affect almost every organ in your body. Smoking puts you, and those around you, at risk for developing many serious chronic diseases. Quitting smoking is difficult, but it is one of the best things that you can do for your health. It is never too late to quit.  What are the benefits of quitting smoking?  When you quit smoking, you lower your risk of developing serious diseases and conditions, such as:  · Lung cancer or lung disease, such as COPD.  · Heart disease.  · Stroke.  · Heart attack.  · Infertility.  · Osteoporosis and bone fractures.  Additionally, symptoms such as coughing, wheezing, and shortness of breath may get better when you quit. You may also find that you get sick less often because your body is stronger at fighting off colds and infections. If you are pregnant, quitting smoking can help to reduce your chances of having a baby of low birth weight.  How do I get ready to quit?  When you decide to quit smoking, create a plan to make sure that you are successful. Before you quit:  · Pick a date to quit. Set a date within the next two weeks to give you time to prepare.  · Write down the reasons why you are quitting. Keep this list in places where you will see it often, such as on your bathroom mirror or in your car or wallet.  · Identify the people, places, things, and activities that make you want to smoke (triggers) and avoid them. Make sure to  take these actions:  ¨ Throw away all cigarettes at home, at work, and in your car.  ¨ Throw away smoking accessories, such as ashtrays and lighters.  ¨ Clean your car and make sure to empty the ashtray.  ¨ Clean your home, including curtains and carpets.  · Tell your family, friends, and coworkers that you are quitting. Support from your loved ones can make quitting easier.  · Talk with your health care provider about your options for quitting smoking.  · Find out what treatment options are covered by your health insurance.  What strategies can I use to quit smoking?  Talk with your healthcare provider about different strategies to quit smoking. Some strategies include:  · Quitting smoking altogether instead of gradually lessening how much you smoke over a period of time. Research shows that quitting “cold turkey” is more successful than gradually quitting.  · Attending in-person counseling to help you build problem-solving skills. You are more likely to have success in quitting if you attend several counseling sessions. Even short sessions of 10 minutes can be effective.  · Finding resources and support systems that can help you to quit smoking and remain smoke-free after you quit. These resources are most helpful when you use them often. They can include:  ¨ Online chats with a counselor.  ¨ Telephone quitlines.  ¨ Printed self-help materials.  ¨ Support groups or group counseling.  ¨ Text messaging programs.  ¨ Mobile phone applications.  · Taking medicines to help you quit smoking. (If you are pregnant or breastfeeding, talk with your health care provider first.) Some medicines contain nicotine and some do not. Both types of medicines help with cravings, but the medicines that include nicotine help to relieve withdrawal symptoms. Your health care provider may recommend:  ¨ Nicotine patches, gum, or lozenges.  ¨ Nicotine inhalers or sprays.  ¨ Non-nicotine medicine that is taken by mouth.  Talk with your health  care provider about combining strategies, such as taking medicines while you are also receiving in-person counseling. Using these two strategies together makes you more likely to succeed in quitting than if you used either strategy on its own.  If you are pregnant or breastfeeding, talk with your health care provider about finding counseling or other support strategies to quit smoking. Do not take medicine to help you quit smoking unless told to do so by your health care provider.  What things can I do to make it easier to quit?  Quitting smoking might feel overwhelming at first, but there is a lot that you can do to make it easier. Take these important actions:  · Reach out to your family and friends and ask that they support and encourage you during this time. Call telephone quitlines, reach out to support groups, or work with a counselor for support.  · Ask people who smoke to avoid smoking around you.  · Avoid places that trigger you to smoke, such as bars, parties, or smoke-break areas at work.  · Spend time around people who do not smoke.  · Lessen stress in your life, because stress can be a smoking trigger for some people. To lessen stress, try:  ¨ Exercising regularly.  ¨ Deep-breathing exercises.  ¨ Yoga.  ¨ Meditating.  ¨ Performing a body scan. This involves closing your eyes, scanning your body from head to toe, and noticing which parts of your body are particularly tense. Purposefully relax the muscles in those areas.  · Download or purchase mobile phone or tablet apps (applications) that can help you stick to your quit plan by providing reminders, tips, and encouragement. There are many free apps, such as QuitGuide from the CDC (Centers for Disease Control and Prevention). You can find other support for quitting smoking (smoking cessation) through smokefree.gov and other websites.  How will I feel when I quit smoking?  Within the first 24 hours of quitting smoking, you may start to feel some  withdrawal symptoms. These symptoms are usually most noticeable 2-3 days after quitting, but they usually do not last beyond 2-3 weeks. Changes or symptoms that you might experience include:  · Mood swings.  · Restlessness, anxiety, or irritation.  · Difficulty concentrating.  · Dizziness.  · Strong cravings for sugary foods in addition to nicotine.  · Mild weight gain.  · Constipation.  · Nausea.  · Coughing or a sore throat.  · Changes in how your medicines work in your body.  · A depressed mood.  · Difficulty sleeping (insomnia).  After the first 2-3 weeks of quitting, you may start to notice more positive results, such as:  · Improved sense of smell and taste.  · Decreased coughing and sore throat.  · Slower heart rate.  · Lower blood pressure.  · Clearer skin.  · The ability to breathe more easily.  · Fewer sick days.  Quitting smoking is very challenging for most people. Do not get discouraged if you are not successful the first time. Some people need to make many attempts to quit before they achieve long-term success. Do your best to stick to your quit plan, and talk with your health care provider if you have any questions or concerns.  This information is not intended to replace advice given to you by your health care provider. Make sure you discuss any questions you have with your health care provider.  Document Released: 12/12/2002 Document Revised: 08/15/2017 Document Reviewed: 05/03/2016  [employer] Interactive Patient Education © 2017 [employer]

## 2022-04-14 ENCOUNTER — OFFICE VISIT (OUTPATIENT)
Dept: OBGYN CLINIC | Age: 87
End: 2022-04-14
Payer: MEDICARE

## 2022-04-14 VITALS
HEART RATE: 61 BPM | WEIGHT: 143 LBS | DIASTOLIC BLOOD PRESSURE: 80 MMHG | BODY MASS INDEX: 21.67 KG/M2 | HEIGHT: 68 IN | SYSTOLIC BLOOD PRESSURE: 157 MMHG

## 2022-04-14 DIAGNOSIS — N95.2 ATROPHIC VAGINITIS: Primary | ICD-10-CM

## 2022-04-14 DIAGNOSIS — R10.2 VAGINAL PAIN: ICD-10-CM

## 2022-04-14 PROCEDURE — 4040F PNEUMOC VAC/ADMIN/RCVD: CPT | Performed by: NURSE PRACTITIONER

## 2022-04-14 PROCEDURE — G8427 DOCREV CUR MEDS BY ELIG CLIN: HCPCS | Performed by: NURSE PRACTITIONER

## 2022-04-14 PROCEDURE — 1036F TOBACCO NON-USER: CPT | Performed by: NURSE PRACTITIONER

## 2022-04-14 PROCEDURE — 1090F PRES/ABSN URINE INCON ASSESS: CPT | Performed by: NURSE PRACTITIONER

## 2022-04-14 PROCEDURE — 1123F ACP DISCUSS/DSCN MKR DOCD: CPT | Performed by: NURSE PRACTITIONER

## 2022-04-14 PROCEDURE — G8420 CALC BMI NORM PARAMETERS: HCPCS | Performed by: NURSE PRACTITIONER

## 2022-04-14 PROCEDURE — 99213 OFFICE O/P EST LOW 20 MIN: CPT | Performed by: NURSE PRACTITIONER

## 2022-04-14 RX ORDER — ESTRADIOL 10 UG/1
INSERT VAGINAL
Qty: 8 TABLET | Refills: 11 | Status: SHIPPED | OUTPATIENT
Start: 2022-04-14

## 2022-04-14 RX ORDER — CONJUGATED ESTROGENS 0.62 MG/G
CREAM VAGINAL
Qty: 30 G | Refills: 5 | Status: SHIPPED | OUTPATIENT
Start: 2022-04-14

## 2022-04-14 ASSESSMENT — ENCOUNTER SYMPTOMS
CONSTIPATION: 0
ALLERGIC/IMMUNOLOGIC NEGATIVE: 1
GASTROINTESTINAL NEGATIVE: 1
EYES NEGATIVE: 1
RESPIRATORY NEGATIVE: 1
DIARRHEA: 0

## 2022-04-14 NOTE — PROGRESS NOTES
Pt is here for f/u on atrophic vaginitis. She states she is not having any pain and is keeping areas clean. She states the cream and vaginal tablet are helping.

## 2022-04-14 NOTE — PATIENT INSTRUCTIONS
Patient Education        Atrophic Vaginitis: Care Instructions  Overview     Atrophic vaginitis is an irritation of the vagina. It's caused by thinning tissues and less moisture in the vaginal walls. It often happens during menopause when hormone levels change. Surgery to remove the ovaries also cancause it. Your doctor may do tests to rule out other causes. The problem is most often treated with the hormone estrogen. It comes in acream, tablets, or a soft plastic ring that is placed in the vagina. Follow-up care is a key part of your treatment and safety. Be sure to make and go to all appointments, and call your doctor if you are having problems. It's also a good idea to know your test results and keep alist of the medicines you take. How can you care for yourself at home?  Use a lubricant for your vagina if sex is dry or painful. Lubricants can be water-,silicone-, or oil-based. Ask your doctor about what kind may be a better option for you.  Talk with your doctor about using vaginal estrogen. It treats dryness and thinning tissue.  Do not douche.  Ask your doctor about pelvic floor physical therapy. Regular vaginal stretching exercises may help reduce symptoms. Regular sexual activity may also help. When should you call for help? Watch closely for changes in your health, and be sure to contact your doctor if:     You have unexpected vaginal bleeding.      You do not get better as expected. Where can you learn more? Go to https://chkatherineeb.health"[employer]. org and sign in to your [employer] account. Enter R330 in the [facility] box to learn more about \"Atrophic Vaginitis: Care Instructions. \"     If you do not have an account, please click on the \"Sign Up Now\" link. Current as of: November 22, 2021               Content Version: 13.2  © 6131-1171 [employer]. Care instructions adapted under license by [facility] ([facility]).  If you have questions about a medical condition or this instruction, always ask your healthcare professional. [place] [address] any warranty or liability for your use of this information.

## 2022-04-14 NOTE — PROGRESS NOTES
Lawrence Osorio is a 80 y.o. female who presents today for her medical conditions/ complaints as noted below. Lawrence Osorio is c/o of Vaginal Pain        HPI  Pt presents for 4 month f/u on vaginal atrophy and vulvar lesion. Denies any problems with pain or itching. Has not been using steroid topical topical, only vaginal estrogen and doing well. No LMP recorded. Patient has had a hysterectomy.     Past Medical History:   Diagnosis Date    Cystic disease of breast     Heart murmur     Dr. Rufus Parekh    Hypertension     Status post placement of implantable loop recorder 2017    Type II or unspecified type diabetes mellitus without mention of complication, not stated as uncontrolled     borderline     Past Surgical History:   Procedure Laterality Date    APPENDECTOMY      BREAST SURGERY      biopsy x3 benign    CHOLECYSTECTOMY      2020    COLONOSCOPY      HERNIA REPAIR      HYSTERECTOMY, TOTAL ABDOMINAL      Dr. Kyrie Dillard       Family History   Problem Relation Age of Onset    Heart Disease Father      Social History     Tobacco Use    Smoking status: Never Smoker    Smokeless tobacco: Never Used   Substance Use Topics    Alcohol use: Yes     Comment: small glass wine daily       Current Outpatient Medications   Medication Sig Dispense Refill    conjugated estrogens (PREMARIN) 0.625 MG/GM vaginal cream INSERT 0.5 GRAMS VAGINALLY TWICE A WEEK 30 g 5    Estradiol (VAGIFEM) 10 MCG TABS vaginal tablet . 8 tablet 11    colestipol (COLESTID) 1 g tablet Take 1 g by mouth 4 times daily      oxybutynin (DITROPAN XL) 5 MG extended release tablet Take 1 tablet by mouth daily 30 tablet 3    amLODIPine (NORVASC) 2.5 MG tablet 2.5 mg      clobetasol (TEMOVATE) 0.05 % ointment Apply topically prn 1 Tube 0    donepezil (ARICEPT) 5 MG tablet Take 10 mg by mouth nightly       pregabalin (LYRICA) 150 MG capsule Take 100 mg by mouth 3 times daily.        Calcium Carbonate Antacid (TUMS PO) Take 400 mg by mouth daily PT TAKES 2 400 MG TABLETS DAILY      Potassium 99 MG TABS Take 99 mg by mouth daily      Biotin 1000 MCG TABS Take 5,000 mcg by mouth daily      Probiotic Product (PROBIOTIC DAILY PO) Take 1 tablet by mouth daily      Cholecalciferol (VITAMIN D3) 1000 units TABS Take 1 tablet by mouth daily      zolpidem (AMBIEN) 5 MG tablet Take 5 mg by mouth as needed for Sleep      vitamin B-12 (CYANOCOBALAMIN) 100 MCG tablet Take 100 mcg by mouth daily        No current facility-administered medications for this visit. Allergies   Allergen Reactions    Penicillins Nausea And Vomiting    Quinine Derivatives Nausea And Vomiting    Tizanidine      Weakness, fever    Sulfa Antibiotics Rash     Vitals:    04/14/22 1051   BP: (!) 157/80   Pulse: 61     Body mass index is 21.74 kg/m². Review of Systems   Constitutional: Negative. HENT: Negative. Eyes: Negative. Respiratory: Negative. Cardiovascular: Negative. Gastrointestinal: Negative. Negative for constipation and diarrhea. Endocrine: Negative. Genitourinary: Negative. Negative for frequency, menstrual problem and urgency. Musculoskeletal: Negative. Skin: Negative. Allergic/Immunologic: Negative. Neurological: Negative. Hematological: Negative. Psychiatric/Behavioral: Negative. All other systems reviewed and are negative. Physical Exam  Vitals and nursing note reviewed. Constitutional:       Appearance: She is well-developed. HENT:      Head: Normocephalic. Right Ear: External ear normal.      Left Ear: External ear normal.      Nose: Nose normal.   Genitourinary:     Comments: No isolated lesions, no AWE, slightly erythematic in labia but much improved from last exam  No atrophy noted in vaginal vault  Musculoskeletal:         General: Normal range of motion. Cervical back: Normal range of motion. Skin:     General: Skin is warm and dry.    Neurological:      Mental Status: She is alert and oriented to person, place, and time. Psychiatric:         Attention and Perception: Attention normal.         Mood and Affect: Mood normal.         Speech: Speech normal.         Behavior: Behavior normal.         Thought Content: Thought content normal.         Cognition and Memory: Cognition normal.         Judgment: Judgment normal.          Diagnosis Orders   1. Atrophic vaginitis  conjugated estrogens (PREMARIN) 0.625 MG/GM vaginal cream    Estradiol (VAGIFEM) 10 MCG TABS vaginal tablet   2. Vaginal pain  conjugated estrogens (PREMARIN) 0.625 MG/GM vaginal cream    Estradiol (VAGIFEM) 10 MCG TABS vaginal tablet       MEDICATIONS:  Orders Placed This Encounter   Medications    conjugated estrogens (PREMARIN) 0.625 MG/GM vaginal cream     Sig: INSERT 0.5 GRAMS VAGINALLY TWICE A WEEK     Dispense:  30 g     Refill:  5    Estradiol (VAGIFEM) 10 MCG TABS vaginal tablet     Sig: .     Dispense:  8 tablet     Refill:  11       ORDERS:  No orders of the defined types were placed in this encounter. PLAN:  Continue treatment plan, f/u in 6 months or sooner with any problems    Patient Instructions     Patient Education        Atrophic Vaginitis: Care Instructions  Overview     Atrophic vaginitis is an irritation of the vagina. It's caused by thinning tissues and less moisture in the vaginal walls. It often happens during menopause when hormone levels change. Surgery to remove the ovaries also cancause it. Your doctor may do tests to rule out other causes. The problem is most often treated with the hormone estrogen. It comes in acream, tablets, or a soft plastic ring that is placed in the vagina. Follow-up care is a key part of your treatment and safety. Be sure to make and go to all appointments, and call your doctor if you are having problems. It's also a good idea to know your test results and keep alist of the medicines you take. How can you care for yourself at home?    Use a lubricant for your vagina if sex is dry or painful. Lubricants can be water-,silicone-, or oil-based. Ask your doctor about what kind may be a better option for you.  Talk with your doctor about using vaginal estrogen. It treats dryness and thinning tissue.  Do not douche.  Ask your doctor about pelvic floor physical therapy. Regular vaginal stretching exercises may help reduce symptoms. Regular sexual activity may also help. When should you call for help? Watch closely for changes in your health, and be sure to contact your doctor if:     You have unexpected vaginal bleeding.      You do not get better as expected. Where can you learn more? Go to [email or web address] org and sign in to your [employer] account. Enter R330 in the [employer] box to learn more about \"Atrophic Vaginitis: Care Instructions. \"     If you do not have an account, please click on the \"Sign Up Now\" link. Current as of: November 22, 2021               Content Version: 13.2  © 2006-2022 [employer]wise[employer]. Care instructions adapted under license by [facility] ([facility]). If you have questions about a medical condition or this instruction, always ask your healthcare professional. [place] [address] any warranty or liability for your use of this information.

## 2022-05-18 ENCOUNTER — TELEPHONE (OUTPATIENT)
Dept: OBGYN CLINIC | Age: 87
End: 2022-05-18

## 2022-06-02 DIAGNOSIS — R19.7 DIARRHEA OF PRESUMED INFECTIOUS ORIGIN: ICD-10-CM

## 2022-06-02 RX ORDER — MONTELUKAST SODIUM 4 MG/1
1 TABLET, CHEWABLE ORAL 4 TIMES DAILY
Qty: 28 TABLET | Refills: 0 | Status: SHIPPED | OUTPATIENT
Start: 2022-06-02

## 2022-06-04 ENCOUNTER — HOSPITAL ENCOUNTER (OUTPATIENT)
Age: 87
Setting detail: OBSERVATION
Discharge: HOME | End: 2022-06-05
Attending: EMERGENCY MEDICINE
Payer: MEDICARE

## 2022-06-04 ENCOUNTER — APPOINTMENT (OUTPATIENT)
Dept: CT IMAGING | Age: 87
End: 2022-06-04
Payer: MEDICARE

## 2022-06-04 DIAGNOSIS — R53.1 GENERALIZED WEAKNESS: ICD-10-CM

## 2022-06-04 DIAGNOSIS — R42 DIZZINESS: Primary | ICD-10-CM

## 2022-06-04 DIAGNOSIS — Z79.899 POLYPHARMACY: ICD-10-CM

## 2022-06-04 LAB
ALBUMIN SERPL-MCNC: 4.1 G/DL (ref 3.5–5.2)
ALP BLD-CCNC: 92 U/L (ref 35–104)
ALT SERPL-CCNC: 20 U/L (ref 5–33)
ANION GAP SERPL CALCULATED.3IONS-SCNC: 9 MMOL/L (ref 7–19)
AST SERPL-CCNC: 21 U/L (ref 5–32)
BASOPHILS ABSOLUTE: 0.1 K/UL (ref 0–0.2)
BASOPHILS RELATIVE PERCENT: 1.4 % (ref 0–1)
BILIRUB SERPL-MCNC: 0.3 MG/DL (ref 0.2–1.2)
BILIRUBIN URINE: NEGATIVE
BLOOD, URINE: NEGATIVE
BUN BLDV-MCNC: 17 MG/DL (ref 8–23)
CALCIUM SERPL-MCNC: 9.1 MG/DL (ref 8.2–9.6)
CHLORIDE BLD-SCNC: 102 MMOL/L (ref 98–111)
CLARITY: CLEAR
CO2: 27 MMOL/L (ref 22–29)
COLOR: YELLOW
CREAT SERPL-MCNC: 0.7 MG/DL (ref 0.5–0.9)
EOSINOPHILS ABSOLUTE: 0.2 K/UL (ref 0–0.6)
EOSINOPHILS RELATIVE PERCENT: 4.1 % (ref 0–5)
FOLATE: 19.3 NG/ML (ref 4.8–37.3)
GFR AFRICAN AMERICAN: >59
GFR NON-AFRICAN AMERICAN: >60
GLUCOSE BLD-MCNC: 108 MG/DL (ref 74–109)
GLUCOSE URINE: NEGATIVE MG/DL
HBA1C MFR BLD: 6.2 % (ref 4–6)
HCT VFR BLD CALC: 38.9 % (ref 37–47)
HEMOGLOBIN: 12.7 G/DL (ref 12–16)
IMMATURE GRANULOCYTES #: 0 K/UL
KETONES, URINE: NEGATIVE MG/DL
LEUKOCYTE ESTERASE, URINE: NEGATIVE
LV EF: 64 %
LVEF MODALITY: NORMAL
LYMPHOCYTES ABSOLUTE: 1.6 K/UL (ref 1.1–4.5)
LYMPHOCYTES RELATIVE PERCENT: 27.6 % (ref 20–40)
MAGNESIUM: 2.3 MG/DL (ref 1.7–2.3)
MCH RBC QN AUTO: 29.7 PG (ref 27–31)
MCHC RBC AUTO-ENTMCNC: 32.6 G/DL (ref 33–37)
MCV RBC AUTO: 90.9 FL (ref 81–99)
MONOCYTES ABSOLUTE: 0.5 K/UL (ref 0–0.9)
MONOCYTES RELATIVE PERCENT: 9 % (ref 0–10)
NEUTROPHILS ABSOLUTE: 3.4 K/UL (ref 1.5–7.5)
NEUTROPHILS RELATIVE PERCENT: 57.7 % (ref 50–65)
NITRITE, URINE: NEGATIVE
PDW BLD-RTO: 14.5 % (ref 11.5–14.5)
PH UA: 7 (ref 5–8)
PLATELET # BLD: 274 K/UL (ref 130–400)
PMV BLD AUTO: 9.3 FL (ref 9.4–12.3)
POTASSIUM SERPL-SCNC: 4.1 MMOL/L (ref 3.5–5)
PRO-BNP: 153 PG/ML (ref 0–1800)
PROTEIN UA: NEGATIVE MG/DL
RBC # BLD: 4.28 M/UL (ref 4.2–5.4)
SARS-COV-2, NAAT: NOT DETECTED
SODIUM BLD-SCNC: 138 MMOL/L (ref 136–145)
SPECIFIC GRAVITY UA: 1.01 (ref 1–1.03)
TOTAL PROTEIN: 6.7 G/DL (ref 6.6–8.7)
TROPONIN: <0.01 NG/ML (ref 0–0.03)
TSH SERPL DL<=0.05 MIU/L-ACNC: 2.76 UIU/ML (ref 0.27–4.2)
UROBILINOGEN, URINE: 0.2 E.U./DL
VITAMIN B-12: 959 PG/ML (ref 211–946)
VITAMIN D 25-HYDROXY: 48.4 NG/ML
WBC # BLD: 5.9 K/UL (ref 4.8–10.8)

## 2022-06-04 PROCEDURE — 36415 COLL VENOUS BLD VENIPUNCTURE: CPT

## 2022-06-04 PROCEDURE — 82607 VITAMIN B-12: CPT

## 2022-06-04 PROCEDURE — 96372 THER/PROPH/DIAG INJ SC/IM: CPT

## 2022-06-04 PROCEDURE — 85025 COMPLETE CBC W/AUTO DIFF WBC: CPT

## 2022-06-04 PROCEDURE — 6360000002 HC RX W HCPCS: Performed by: HOSPITALIST

## 2022-06-04 PROCEDURE — 80053 COMPREHEN METABOLIC PANEL: CPT

## 2022-06-04 PROCEDURE — 99285 EMERGENCY DEPT VISIT HI MDM: CPT

## 2022-06-04 PROCEDURE — 83036 HEMOGLOBIN GLYCOSYLATED A1C: CPT

## 2022-06-04 PROCEDURE — 83880 ASSAY OF NATRIURETIC PEPTIDE: CPT

## 2022-06-04 PROCEDURE — 70450 CT HEAD/BRAIN W/O DYE: CPT

## 2022-06-04 PROCEDURE — 82746 ASSAY OF FOLIC ACID SERUM: CPT

## 2022-06-04 PROCEDURE — 83735 ASSAY OF MAGNESIUM: CPT

## 2022-06-04 PROCEDURE — 81003 URINALYSIS AUTO W/O SCOPE: CPT

## 2022-06-04 PROCEDURE — 93880 EXTRACRANIAL BILAT STUDY: CPT

## 2022-06-04 PROCEDURE — 70450 CT HEAD/BRAIN W/O DYE: CPT | Performed by: RADIOLOGY

## 2022-06-04 PROCEDURE — 6370000000 HC RX 637 (ALT 250 FOR IP): Performed by: INTERNAL MEDICINE

## 2022-06-04 PROCEDURE — 93306 TTE W/DOPPLER COMPLETE: CPT

## 2022-06-04 PROCEDURE — 84443 ASSAY THYROID STIM HORMONE: CPT

## 2022-06-04 PROCEDURE — 2580000003 HC RX 258: Performed by: HOSPITALIST

## 2022-06-04 PROCEDURE — G0378 HOSPITAL OBSERVATION PER HR: HCPCS

## 2022-06-04 PROCEDURE — 87635 SARS-COV-2 COVID-19 AMP PRB: CPT

## 2022-06-04 PROCEDURE — 84484 ASSAY OF TROPONIN QUANT: CPT

## 2022-06-04 PROCEDURE — 82306 VITAMIN D 25 HYDROXY: CPT

## 2022-06-04 PROCEDURE — 2580000003 HC RX 258: Performed by: EMERGENCY MEDICINE

## 2022-06-04 RX ORDER — ENOXAPARIN SODIUM 100 MG/ML
40 INJECTION SUBCUTANEOUS DAILY
Status: DISCONTINUED | OUTPATIENT
Start: 2022-06-04 | End: 2022-06-05 | Stop reason: HOSPADM

## 2022-06-04 RX ORDER — PREGABALIN 50 MG/1
100 CAPSULE ORAL 3 TIMES DAILY
Status: DISCONTINUED | OUTPATIENT
Start: 2022-06-04 | End: 2022-06-05 | Stop reason: HOSPADM

## 2022-06-04 RX ORDER — ONDANSETRON 4 MG/1
4 TABLET, ORALLY DISINTEGRATING ORAL EVERY 8 HOURS PRN
Status: DISCONTINUED | OUTPATIENT
Start: 2022-06-04 | End: 2022-06-05 | Stop reason: HOSPADM

## 2022-06-04 RX ORDER — SODIUM CHLORIDE 0.9 % (FLUSH) 0.9 %
5-40 SYRINGE (ML) INJECTION EVERY 12 HOURS SCHEDULED
Status: DISCONTINUED | OUTPATIENT
Start: 2022-06-04 | End: 2022-06-05 | Stop reason: HOSPADM

## 2022-06-04 RX ORDER — ACETAMINOPHEN 650 MG/1
650 SUPPOSITORY RECTAL EVERY 6 HOURS PRN
Status: DISCONTINUED | OUTPATIENT
Start: 2022-06-04 | End: 2022-06-05 | Stop reason: HOSPADM

## 2022-06-04 RX ORDER — SODIUM CHLORIDE 9 MG/ML
INJECTION, SOLUTION INTRAVENOUS CONTINUOUS
Status: DISCONTINUED | OUTPATIENT
Start: 2022-06-04 | End: 2022-06-05 | Stop reason: HOSPADM

## 2022-06-04 RX ORDER — SODIUM CHLORIDE 9 MG/ML
INJECTION, SOLUTION INTRAVENOUS PRN
Status: DISCONTINUED | OUTPATIENT
Start: 2022-06-04 | End: 2022-06-05 | Stop reason: HOSPADM

## 2022-06-04 RX ORDER — ONDANSETRON 2 MG/ML
4 INJECTION INTRAMUSCULAR; INTRAVENOUS EVERY 6 HOURS PRN
Status: DISCONTINUED | OUTPATIENT
Start: 2022-06-04 | End: 2022-06-05 | Stop reason: HOSPADM

## 2022-06-04 RX ORDER — AMLODIPINE BESYLATE 2.5 MG/1
2.5 TABLET ORAL DAILY
Status: DISCONTINUED | OUTPATIENT
Start: 2022-06-04 | End: 2022-06-05 | Stop reason: HOSPADM

## 2022-06-04 RX ORDER — SODIUM CHLORIDE 9 MG/ML
INJECTION, SOLUTION INTRAVENOUS CONTINUOUS
Status: DISCONTINUED | OUTPATIENT
Start: 2022-06-04 | End: 2022-06-04

## 2022-06-04 RX ORDER — POLYETHYLENE GLYCOL 3350 17 G/17G
17 POWDER, FOR SOLUTION ORAL DAILY PRN
Status: DISCONTINUED | OUTPATIENT
Start: 2022-06-04 | End: 2022-06-05 | Stop reason: HOSPADM

## 2022-06-04 RX ORDER — 0.9 % SODIUM CHLORIDE 0.9 %
500 INTRAVENOUS SOLUTION INTRAVENOUS ONCE
Status: COMPLETED | OUTPATIENT
Start: 2022-06-04 | End: 2022-06-04

## 2022-06-04 RX ORDER — 0.9 % SODIUM CHLORIDE 0.9 %
500 INTRAVENOUS SOLUTION INTRAVENOUS ONCE
Status: DISCONTINUED | OUTPATIENT
Start: 2022-06-04 | End: 2022-06-04

## 2022-06-04 RX ORDER — DONEPEZIL HYDROCHLORIDE 5 MG/1
10 TABLET, FILM COATED ORAL NIGHTLY
Status: DISCONTINUED | OUTPATIENT
Start: 2022-06-04 | End: 2022-06-05 | Stop reason: HOSPADM

## 2022-06-04 RX ORDER — ACETAMINOPHEN 325 MG/1
650 TABLET ORAL EVERY 6 HOURS PRN
Status: DISCONTINUED | OUTPATIENT
Start: 2022-06-04 | End: 2022-06-05 | Stop reason: HOSPADM

## 2022-06-04 RX ORDER — SODIUM CHLORIDE 0.9 % (FLUSH) 0.9 %
5-40 SYRINGE (ML) INJECTION PRN
Status: DISCONTINUED | OUTPATIENT
Start: 2022-06-04 | End: 2022-06-05 | Stop reason: HOSPADM

## 2022-06-04 RX ORDER — MONTELUKAST SODIUM 4 MG/1
1 TABLET, CHEWABLE ORAL 4 TIMES DAILY
Status: DISCONTINUED | OUTPATIENT
Start: 2022-06-04 | End: 2022-06-05 | Stop reason: HOSPADM

## 2022-06-04 RX ADMIN — DONEPEZIL HYDROCHLORIDE 10 MG: 5 TABLET, FILM COATED ORAL at 20:38

## 2022-06-04 RX ADMIN — SODIUM CHLORIDE, PRESERVATIVE FREE 10 ML: 5 INJECTION INTRAVENOUS at 12:56

## 2022-06-04 RX ADMIN — SODIUM CHLORIDE, PRESERVATIVE FREE 10 ML: 5 INJECTION INTRAVENOUS at 20:38

## 2022-06-04 RX ADMIN — ENOXAPARIN SODIUM 40 MG: 100 INJECTION SUBCUTANEOUS at 12:54

## 2022-06-04 RX ADMIN — PREGABALIN 100 MG: 50 CAPSULE ORAL at 16:58

## 2022-06-04 RX ADMIN — AMLODIPINE BESYLATE 2.5 MG: 2.5 TABLET ORAL at 16:58

## 2022-06-04 RX ADMIN — PREGABALIN 100 MG: 50 CAPSULE ORAL at 20:38

## 2022-06-04 RX ADMIN — SODIUM CHLORIDE 500 ML: 9 INJECTION, SOLUTION INTRAVENOUS at 04:31

## 2022-06-04 ASSESSMENT — ENCOUNTER SYMPTOMS
COUGH: 0
NAUSEA: 0
ABDOMINAL PAIN: 0
RHINORRHEA: 0
BACK PAIN: 0
SORE THROAT: 0
SHORTNESS OF BREATH: 0
DIARRHEA: 0
VOMITING: 0

## 2022-06-04 ASSESSMENT — LIFESTYLE VARIABLES
HOW MANY STANDARD DRINKS CONTAINING ALCOHOL DO YOU HAVE ON A TYPICAL DAY: 1 OR 2
HOW OFTEN DO YOU HAVE A DRINK CONTAINING ALCOHOL: MONTHLY OR LESS

## 2022-06-04 ASSESSMENT — PAIN - FUNCTIONAL ASSESSMENT: PAIN_FUNCTIONAL_ASSESSMENT: NONE - DENIES PAIN

## 2022-06-04 NOTE — H&P
History and Physical    Patient Name:  Britney Esqueda    :  1929    Chief Complaint:   Dizziness     History of Present Illness:   Britney Esqueda presents to [facility] presented with severe dizziness this am, she was not able to walk straight this am, she walked from her kitchen and spilled coffee. Denies headaches or vertigo. Complains of sinus congestion recently. Denies chest pain, no palpitations, no dyspnea, no cough, no wheezing. No abd pain, no N/V/D/C, no dysuria or hematuria. Has history of syncope     Past Medical History:   has a past medical history of Cystic disease of breast, Heart murmur, Hypertension, Status post placement of implantable loop recorder, and Type II or unspecified type diabetes mellitus without mention of complication, not stated as uncontrolled. Surgical History:   has a past surgical history that includes Appendectomy; Hysterectomy, total abdominal; hernia repair; Breast surgery; Tonsillectomy; Cholecystectomy; and Colonoscopy. Social History:   reports that she has never smoked. She has never used smokeless tobacco. She reports current alcohol use. She reports that she does not use drugs. Family History:  family history includes Heart Disease in her father. Medications:  Prior to Admission medications    Medication Sig Start Date End Date Taking? Authorizing Provider   conjugated estrogens (PREMARIN) 0.625 MG/GM vaginal cream INSERT 0.5 GRAMS VAGINALLY TWICE A WEEK 22   LANCE Perez CNP   Estradiol (VAGIFEM) 10 MCG TABS vaginal tablet .  22   LANCE Perez CNP   colestipol (COLESTID) 1 g tablet Take 1 g by mouth 4 times daily    Historical Provider, MD   amLODIPine (NORVASC) 2.5 MG tablet 2.5 mg 10/21/19   Historical Provider, MD   clobetasol (TEMOVATE) 0.05 % ointment Apply topically prn 19   LANCE Perez CNP   donepezil (ARICEPT) 5 MG tablet Take 10 mg by mouth nightly     Historical Provider, MD   pregabalin (LYRICA) 150 MG capsule Take 100 mg by mouth 3 times daily. Historical Provider, MD   Calcium Carbonate Antacid (TUMS PO) Take 400 mg by mouth daily PT TAKES 2 400 MG TABLETS DAILY    Historical Provider, MD   Potassium 99 MG TABS Take 99 mg by mouth daily    Historical Provider, MD   Biotin 1000 MCG TABS Take 5,000 mcg by mouth daily    Historical Provider, MD   Probiotic Product (PROBIOTIC DAILY PO) Take 1 tablet by mouth daily    Historical Provider, MD   Cholecalciferol (VITAMIN D3) 1000 units TABS Take 1 tablet by mouth daily    Historical Provider, MD   vitamin B-12 (CYANOCOBALAMIN) 100 MCG tablet Take 100 mcg by mouth daily     Historical Provider, MD       Allergies:  Penicillins, Quinine derivatives, Tizanidine, and Sulfa antibiotics     Review of Systems:   · Constitutional: there has been no unanticipated weight loss. There's been no change in energy level, sleep pattern, or activity level. · Eyes: No visual changes or diplopia. No scleral icterus. · ENT: No Headaches, hearing loss. No mouth sores or sore throat. · Cardiovascular: No chest pain, palpitations or loss of consciousness. No pleuritic pain or phlebitis. No orthopnea, PND or peripheral edema. dizziness   · Respiratory: No cough or wheezing, no sputum production. No hemoptysis. No dyspnea     · Gastrointestinal: No abdominal pain, appetite loss, blood in stools. No change in bowel habits. No N/V. No blood per rectum. · Genitourinary: No dysuria, trouble voiding, or hematuria. · Musculoskeletal:  No gait disturbance, weakness or joint complaints. · Integumentary: No rash or pruritis. · Neurological: No headache, diplopia, change in muscle strength, numbness or tingling. No change in gait, balance, coordination. · Psychiatric: No anxiety, or depression. · Endocrine: No temperature intolerance. No excessive thirst, fluid intake, or urination. No tremor.   · Hematologic/Lymphatic: No abnormal bruising or bleeding, blood clots or swollen lymph nodes. · Allergic/Immunologic: No nasal congestion or hives. Physical Examination:    Vital Signs: BP (!) 140/62   Pulse 54   Temp (!) 96.4 °F (35.8 °C)   Resp 16   Ht 5' 8\" (1.727 m)   Wt 155 lb (70.3 kg)   SpO2 99%   BMI 23.57 kg/m²   General appearance: Well preserved, mesomorphic body habitus, alert, no distress. Skin: Skin color, texture, turgor normal. No rashes or lesions. No induration or tightening palpated. Head: Normocephalic. No masses, lesions, tenderness or abnormalities  Eyes: conjunctivae/corneas clear. EOM's intact. Sclera non icteric. Ears: External ears normal.  Hearing normal to finger rub. Nose/Sinuses: Nares normal. Septum midline. Mucosa normal. No drainage or sinus tenderness. Oropharynx: Lips, mucosa, and tongue normal. Oropharynx clear with no exudate seen. Neck: Neck supple, and symmetric. No adenopathy. Trachea is midline. Carotids brisk in upstroke without bruits, No abnormal JVP noted at 45°. Lungs: Lungs clear to auscultation bilaterally. No retractions or use of accessory muscles. No vocal fremitus. No ronchi, crackle or rale. Heart:  S1 > S2. Regular rate and rhythm. No gallop, murmur, rub, palpable thrill or heave noted. Abdomen: Abdomen soft, non-tender. BS normal. No masses, organomegaly. No hernia noted. Extremities: Extremities normal. No deformities, edema, or skin discoloration. No cyanosis or clubbing noted to the nails. Peripheral pulses 4/4. Musculoskeletal: Spine ROM normal. Muscular strength intact. Neuro: Cranial nerves intact. Motor: Strength 5/5 in all extremities. No focal weakness. Sensory: grossly normal to touch.      Pertinent Labs:  CBC:   Recent Labs     06/04/22  0355   WBC 5.9   RBC 4.28   HGB 12.7   HCT 38.9   MCV 90.9   MCH 29.7   MCHC 32.6*   RDW 14.5      MPV 9.3*     BMP:   Recent Labs     06/04/22  0355      K 4.1      CO2 27   BUN 17   CREATININE 0.7   GLUCOSE 108   CALCIUM 9.1     ABGs: No results for input(s): PO2, PCO2, PH, HCO3, BE, O2SAT in the last 72 hours. INR: No results for input(s): INR, PROTIME in the last 72 hours. BNP:  No results found for: BNP  TSH:   Lab Results   Component Value Date    TSH 0.889 08/05/2011     Cardiac Injury Profile:   Lab Results   Component Value Date    TROPONINI <0.01 06/04/2022     Lipid Profile: No components found for: CHLPL  No results found for: TRIG  No results found for: HDL  No results found for: LDLCALC  No results found for: LABVLDL  Hemoglobin A1C:   Lab Results   Component Value Date    LABA1C 6.2 (H) 06/04/2022     No results found for: EAG      Assessment/Plan:  · Dizziness   - ct head, carotid us, echo- hold norvasc and other home meds- IVF- PT/OT- telemetry- may need zio on dc   Patient Active Problem List:     History Syncope     Status post placement of implantable loop recorder                 I have reviewed my findings and recommendations in detail with Kelsey Adams.     Zina Schilder, MD

## 2022-06-04 NOTE — PLAN OF CARE
Patient admitted this a.m. Writer assumes care of patient this AM.  Patient seen and examined. Doing well. No new complaints. Denies any further dizziness or lightheadedness. Family at bedside. Continue current management.

## 2022-06-04 NOTE — ED NOTES
Patient became extremely lightheaded during orthostatic vitals and could barely tolerate test.  Reports taking Ambien nightly and also Melatonin.      Karel Field RN  06/04/22 3245

## 2022-06-04 NOTE — ED NOTES
Patient has been educated first by Dr Avelina Shook and now by RN as to why she is being admitted, patient consents and verbalizes understanding.      Lyla Lantigua RN  06/04/22 2086

## 2022-06-04 NOTE — PLAN OF CARE
Problem: Discharge Planning  Goal: Discharge to home or other facility with appropriate resources  Outcome: Progressing  Flowsheets  Taken 6/4/2022 1649  Discharge to home or other facility with appropriate resources:   Identify barriers to discharge with patient and caregiver   Identify discharge learning needs (meds, wound care, etc)   Refer to discharge planning if patient needs post-hospital services based on physician order or complex needs related to functional status, cognitive ability or social support system   Arrange for needed discharge resources and transportation as appropriate   Arrange for interpreters to assist at discharge as needed  Taken 6/4/2022 0949  Discharge to home or other facility with appropriate resources: Identify barriers to discharge with patient and caregiver     Problem: Safety - Adult  Goal: Free from fall injury  Outcome: Progressing     Problem: ABCDS Injury Assessment  Goal: Absence of physical injury  Outcome: Progressing

## 2022-06-04 NOTE — ED PROVIDER NOTES
PAST MEDICALHISTORY     Past Medical History:   Diagnosis Date    Cystic disease of breast     Heart murmur     Dr. Shayy Jauregui    Hypertension     Status post placement of implantable loop recorder 08/22/2017    Type II or unspecified type diabetes mellitus without mention of complication, not stated as uncontrolled     borderline         SURGICAL HISTORY       Past Surgical History:   Procedure Laterality Date    APPENDECTOMY      BREAST SURGERY      biopsy x3 benign    CHOLECYSTECTOMY      2020    COLONOSCOPY      HERNIA REPAIR      HYSTERECTOMY, TOTAL ABDOMINAL      Dr. Jagjit Mccoy     Current Discharge Medication List      CONTINUE these medications which have NOT CHANGED    Details   conjugated estrogens (PREMARIN) 0.625 MG/GM vaginal cream INSERT 0.5 GRAMS VAGINALLY TWICE A WEEK  Qty: 30 g, Refills: 5    Associated Diagnoses: Atrophic vaginitis; Vaginal pain      Estradiol (VAGIFEM) 10 MCG TABS vaginal tablet . Qty: 8 tablet, Refills: 11    Associated Diagnoses: Atrophic vaginitis; Vaginal pain      colestipol (COLESTID) 1 g tablet Take 1 g by mouth 4 times daily      amLODIPine (NORVASC) 2.5 MG tablet 2.5 mg      clobetasol (TEMOVATE) 0.05 % ointment Apply topically prn  Qty: 1 Tube, Refills: 0      donepezil (ARICEPT) 5 MG tablet Take 10 mg by mouth nightly       pregabalin (LYRICA) 150 MG capsule Take 100 mg by mouth 3 times daily.        Calcium Carbonate Antacid (TUMS PO) Take 400 mg by mouth daily PT TAKES 2 400 MG TABLETS DAILY      Potassium 99 MG TABS Take 99 mg by mouth daily      Biotin 1000 MCG TABS Take 5,000 mcg by mouth daily      Probiotic Product (PROBIOTIC DAILY PO) Take 1 tablet by mouth daily      Cholecalciferol (VITAMIN D3) 1000 units TABS Take 1 tablet by mouth daily      vitamin B-12 (CYANOCOBALAMIN) 100 MCG tablet Take 100 mcg by mouth daily              ALLERGIES     Penicillins, Quinine derivatives, Tizanidine, and Sulfa antibiotics    FAMILY HISTORY       Family History   Problem Relation Age of Onset    Heart Disease Father           SOCIAL HISTORY       Social History     Socioeconomic History    Marital status: Single     Spouse name: Not on file    Number of children: Not on file    Years of education: Not on file    Highest education level: Not on file   Occupational History    Not on file   Tobacco Use    Smoking status: Never Smoker    Smokeless tobacco: Never Used   Vaping Use    Vaping Use: Never used   Substance and Sexual Activity    Alcohol use: Yes     Comment: small glass wine daily    Drug use: No    Sexual activity: Not on file   Other Topics Concern    Not on file   Social History Narrative    Not on file     Social Determinants of Health     Financial Resource Strain:     Difficulty of Paying Living Expenses: Not on file   Food Insecurity:     Worried About Running Out of Food in the Last Year: Not on file    Terry of Food in the Last Year: Not on file   Transportation Needs:     Lack of Transportation (Medical): Not on file    Lack of Transportation (Non-Medical):  Not on file   Physical Activity:     Days of Exercise per Week: Not on file    Minutes of Exercise per Session: Not on file   Stress:     Feeling of Stress : Not on file   Social Connections:     Frequency of Communication with Friends and Family: Not on file    Frequency of Social Gatherings with Friends and Family: Not on file    Attends Anabaptist Services: Not on file    Active Member of Clubs or Organizations: Not on file    Attends Club or Organization Meetings: Not on file    Marital Status: Not on file   Intimate Partner Violence:     Fear of Current or Ex-Partner: Not on file    Emotionally Abused: Not on file    Physically Abused: Not on file    Sexually Abused: Not on file   Housing Stability:     Unable to Pay for Housing in the Last Year: Not on file    Number of [place] in the Last Year: Not on file  Unstable Housing in the Last Year: Not on file       SCREENINGS    Mari Coma Scale  Eye Opening: Spontaneous  Best Verbal Response: Oriented  Best Motor Response: Obeys commands  [place] Coma Scale Score: 15        PHYSICAL EXAM    (up to 7 for level 4, 8 or more for level 5)     ED Triage Vitals [06/04/22 0350]   BP Temp Temp Source Heart Rate Resp SpO2 Height Weight   (!) 166/85 97.6 °F (36.4 °C) Oral 62 18 96 % 5' 8\" (1.727 m) 155 lb (70.3 kg)       Physical Exam  Vitals and nursing note reviewed. Constitutional:       General: She is not in acute distress. Appearance: Normal appearance. She is well-developed. She is not ill-appearing or diaphoretic. HENT:      Head: Normocephalic and atraumatic. Right Ear: External ear normal.      Left Ear: External ear normal.      Nose: Nose normal.      Mouth/Throat:      Mouth: Mucous membranes are moist.   Eyes:      Extraocular Movements: Extraocular movements intact. Conjunctiva/sclera: Conjunctivae normal.      Pupils: Pupils are equal, round, and reactive to light. Neck:      Trachea: No tracheal deviation. Cardiovascular:      Rate and Rhythm: Normal rate and regular rhythm. Pulses: Normal pulses. Heart sounds: Normal heart sounds. Pulmonary:      Effort: Pulmonary effort is normal. No respiratory distress. Breath sounds: Normal breath sounds. No wheezing or rales. Abdominal:      Palpations: Abdomen is soft. There is no mass. Tenderness: There is no abdominal tenderness. Musculoskeletal:         General: Normal range of motion. Cervical back: Normal range of motion. Right lower leg: No edema. Left lower leg: No edema. Skin:     General: Skin is warm and dry. Neurological:      Mental Status: She is alert and oriented to person, place, and time. GCS: GCS eye subscore is 4. GCS verbal subscore is 5. GCS motor subscore is 6.       Cranial Nerves: No cranial nerve deficit, dysarthria or facial asymmetry. Sensory: Sensation is intact. Motor: No weakness or abnormal muscle tone. Coordination: Coordination is intact. Finger-Nose-Finger Test normal.         DIAGNOSTIC RESULTS     EKG: All EKG's areinterpreted by the Emergency Department Physician who either signs or Co-signs this chart in the absence of a cardiologist.    54 normal sinus rhythm with sinus arrhythmia no obvious ST changes nondiagnostic EKG    RADIOLOGY:  Non-plain film images such as CT, Ultrasound and MRI are read by the radiologist. Plain radiographic images are visualized and preliminarily interpreted bythe emergency physician with the below findings:      [address]   Final Result       1. Age appropriate cerebral and cerebellar atrophy. 2. No acute intracranial abnormality. Recommendation: Follow up as clinically indicated. All CT scans at this facility utilize dose modulation, iterative reconstruction, and/or weight based dosing when appropriate to reduce radiation dose to as low as reasonably achievable. Electronically Signed by Jefe Tanner MD at 04-Jun-2022 07:40:37 AM                       LABS:  Labs Reviewed   CBC WITH AUTO DIFFERENTIAL - Abnormal; Notable for the following components:       Result Value    MCHC 32.6 (*)     MPV 9.3 (*)     Basophils % 1.4 (*)     All other components within normal limits   HEMOGLOBIN A1C - Abnormal; Notable for the following components:    Hemoglobin A1C 6.2 (*)     All other components within normal limits   COVID-19, RAPID   COMPREHENSIVE METABOLIC PANEL   TROPONIN   URINALYSIS WITH REFLEX TO CULTURE   MAGNESIUM   BRAIN NATRIURETIC PEPTIDE   FOLATE   VITAMIN B12   TSH   VITAMIN D 25 HYDROXY       All other labs were within normal range or not returned as of this dictation.     EMERGENCY DEPARTMENT COURSE and DIFFERENTIAL DIAGNOSIS/MDM:   Vitals:    Vitals:    06/04/22 0515 06/04/22 0530 06/04/22 0600 06/04/22 0647   BP:   (!) 161/94 (!) 140/62   Pulse: 56 87 57 54   Resp: 16 21 24 16   Temp:    (!) 96.4 °F (35.8 °C)   TempSrc:       SpO2: 96% 92% 97% 99%   Weight:       Height:           MDM  Number of Diagnoses or Management Options     Amount and/or Complexity of Data Reviewed  Clinical lab tests: ordered and reviewed  Tests in the radiology section of CPT®: ordered and reviewed  Discuss the patient with other providers: yes  Independent visualization of images, tracings, or specimens: yes      Denies vertigo admits to more lightheadedness and difficulty ambulating due to gen weakness. Labs and ct head reassuring. Possible mix of deconditioning and polypharmacy. Reviewing her home meds she is taking ambien at night and other meds could be contributory as well. Pt lives at home alone. May need rehab/SNF placement. Unable to go home as requiring significant assistance just to transfer. Non focal neuro exam no concern for cva. D/w Dr. Alex Crespo for admission. CONSULTS:  IP CONSULT TO CASE MANAGEMENT    PROCEDURES:  Unless otherwise noted below, none     Procedures    FINAL IMPRESSION      1. Dizziness    2. Generalized weakness    3. Polypharmacy          DISPOSITION/PLAN   DISPOSITION Admitted 06/04/2022 06:16:45 AM      PATIENT REFERRED TO:  No follow-up provider specified.     DISCHARGE MEDICATIONS:  Current Discharge Medication List             (Please note that portions of this note were completed with a voice recognition program.  Efforts were made to edit thedictations but occasionally words are mis-transcribed.)    Lola Osborne MD (electronically signed)  Attending Emergency Physician        Chayito Mosher MD  06/04/22 4985

## 2022-06-04 NOTE — ED NOTES
Similar episode 5 years ago, loop recorder placed, nothing found, already had a-fib dx.      Lyly Ram RN  06/04/22 7313

## 2022-06-04 NOTE — ED NOTES
In room to assist with bedside commode. Patient reports less feeling of lightheadedness but still extremely weak, dificult to assist with stand and pivot to bedside commode. Still very unusual for patient as she ambulates at home almost all of the time without her cane.      Karel Field RN  06/04/22 4662

## 2022-06-05 VITALS
HEIGHT: 68 IN | HEART RATE: 68 BPM | SYSTOLIC BLOOD PRESSURE: 154 MMHG | RESPIRATION RATE: 18 BRPM | TEMPERATURE: 98.1 F | DIASTOLIC BLOOD PRESSURE: 76 MMHG | WEIGHT: 155 LBS | BODY MASS INDEX: 23.49 KG/M2 | OXYGEN SATURATION: 97 %

## 2022-06-05 LAB
ANION GAP SERPL CALCULATED.3IONS-SCNC: 11 MMOL/L (ref 7–19)
BUN BLDV-MCNC: 18 MG/DL (ref 8–23)
CALCIUM SERPL-MCNC: 9 MG/DL (ref 8.2–9.6)
CHLORIDE BLD-SCNC: 100 MMOL/L (ref 98–111)
CHOLESTEROL, TOTAL: 243 MG/DL (ref 160–199)
CO2: 27 MMOL/L (ref 22–29)
CREAT SERPL-MCNC: 0.7 MG/DL (ref 0.5–0.9)
GFR AFRICAN AMERICAN: >59
GFR NON-AFRICAN AMERICAN: >60
GLUCOSE BLD-MCNC: 94 MG/DL (ref 74–109)
HCT VFR BLD CALC: 41.8 % (ref 37–47)
HDLC SERPL-MCNC: 91 MG/DL (ref 65–121)
HEMOGLOBIN: 13.3 G/DL (ref 12–16)
LDL CHOLESTEROL CALCULATED: 129 MG/DL
MCH RBC QN AUTO: 29.6 PG (ref 27–31)
MCHC RBC AUTO-ENTMCNC: 31.8 G/DL (ref 33–37)
MCV RBC AUTO: 92.9 FL (ref 81–99)
PDW BLD-RTO: 14.5 % (ref 11.5–14.5)
PLATELET # BLD: 282 K/UL (ref 130–400)
PMV BLD AUTO: 9.5 FL (ref 9.4–12.3)
POTASSIUM SERPL-SCNC: 3.9 MMOL/L (ref 3.5–5)
RBC # BLD: 4.5 M/UL (ref 4.2–5.4)
SODIUM BLD-SCNC: 138 MMOL/L (ref 136–145)
TRIGL SERPL-MCNC: 115 MG/DL (ref 0–149)
WBC # BLD: 7.1 K/UL (ref 4.8–10.8)

## 2022-06-05 PROCEDURE — 80061 LIPID PANEL: CPT

## 2022-06-05 PROCEDURE — 80048 BASIC METABOLIC PNL TOTAL CA: CPT

## 2022-06-05 PROCEDURE — G0378 HOSPITAL OBSERVATION PER HR: HCPCS

## 2022-06-05 PROCEDURE — 6370000000 HC RX 637 (ALT 250 FOR IP): Performed by: INTERNAL MEDICINE

## 2022-06-05 PROCEDURE — 2580000003 HC RX 258: Performed by: HOSPITALIST

## 2022-06-05 PROCEDURE — 93246 EXT ECG>7D<15D RECORDING: CPT

## 2022-06-05 PROCEDURE — 85027 COMPLETE CBC AUTOMATED: CPT

## 2022-06-05 PROCEDURE — 36415 COLL VENOUS BLD VENIPUNCTURE: CPT

## 2022-06-05 RX ADMIN — AMLODIPINE BESYLATE 2.5 MG: 2.5 TABLET ORAL at 08:44

## 2022-06-05 RX ADMIN — SODIUM CHLORIDE, PRESERVATIVE FREE 10 ML: 5 INJECTION INTRAVENOUS at 08:45

## 2022-06-05 RX ADMIN — PREGABALIN 100 MG: 50 CAPSULE ORAL at 08:44

## 2022-06-05 NOTE — PLAN OF CARE
Problem: Discharge Planning  Goal: Discharge to home or other facility with appropriate resources  6/5/2022 0336 by Lani Reynolds LPN  Outcome: Progressing  6/4/2022 1857 by Mike Boykin, RN  Outcome: Progressing  Flowsheets  Taken 6/4/2022 1649  Discharge to home or other facility with appropriate resources:   Identify barriers to discharge with patient and caregiver   Identify discharge learning needs (meds, wound care, etc)   Refer to discharge planning if patient needs post-hospital services based on physician order or complex needs related to functional status, cognitive ability or social support system   Arrange for needed discharge resources and transportation as appropriate   Arrange for interpreters to assist at discharge as needed  Taken 6/4/2022 0949  Discharge to home or other facility with appropriate resources: Identify barriers to discharge with patient and caregiver     Problem: Safety - Adult  Goal: Free from fall injury  6/5/2022 0336 by Lani Reynolds LPN  Outcome: Progressing  6/4/2022 1857 by Mike Boykin, RN  Outcome: Progressing     Problem: ABCDS Injury Assessment  Goal: Absence of physical injury  6/5/2022 0336 by Lani Reynolds LPN  Outcome: Progressing  6/4/2022 1857 by Mike Boykin, RN  Outcome: Progressing

## 2022-06-05 NOTE — DISCHARGE SUMMARY
[place], [place], [address]  DEPARTMENT OF HOSPITALIST MEDICINE    DISCHARGE SUMMARY:        Hiren Sam  :  1929  MRN:  [id number]    Admit date:  2022  Discharge date: 2022      Admitting Physician:  No admitting provider for patient encounter. Advance Directive: Full Code    Consults Made:   IP CONSULT TO CASE MANAGEMENT      Primary Care Physician:  Megan Cabrera, DO    Discharge Diagnoses:  Principal Problem:    Dizziness  Resolved Problems:    * No resolved hospital problems. *          Pertinent Labs:  CBC with DIFF:  Recent Labs     22   WBC 5.9 7.1   RBC 4.28 4.50   HGB 12.7 13.3   HCT 38.9 41.8   MCV 90.9 92.9   MCH 29.7 29.6   MCHC 32.6* 31.8*   RDW 14.5 14.5    282   MPV 9.3* 9.5   NEUTOPHILPCT 57.7  --    LYMPHOPCT 27.6  --    MONOPCT 9.0  --    EOSRELPCT 4.1  --    BASOPCT 1.4*  --    NEUTROABS 3.4  --    LYMPHSABS 1.6  --    MONOSABS 0.50  --    EOSABS 0.20  --    BASOSABS 0.10  --        CMP/BMP:  Recent Labs     22    138   K 4.1 3.9    100   CO2 27 27   ANIONGAP 9 11   GLUCOSE 108 94   BUN 17 18   CREATININE 0.7 0.7   LABGLOM >60 >60   CALCIUM 9.1 9.0   PROT 6.7  --    LABALBU 4.1  --    BILITOT 0.3  --    ALKPHOS 92  --    ALT 20  --    AST 21  --          CRP:  No results for input(s): CRP in the last 72 hours. Sed Rate:  No results for input(s): SEDRATE in the last 72 hours. HgBA1c:  No components found for: HGBA1C  FLP:    Lab Results   Component Value Date    TRIG 115 2022    HDL 91 2022    LDLCALC 129 2022     TSH:    Lab Results   Component Value Date    TSH 2.760 2022     Troponin T:   Recent Labs     22   TROPONINI <0.01     Pro-BNP: No results for input(s): BNP in the last 72 hours. INR: No results for input(s): INR in the last 72 hours.   ABGs: No results found for: PHART, PO2ART, YYE5GHH  UA:  Recent Labs     22  2575 COLORU YELLOW   PHUR 7.0   CLARITYU Clear   SPECGRAV 1.010   LEUKOCYTESUR Negative   UROBILINOGEN 0.2   BILIRUBINUR Negative   BLOODU Negative   GLUCOSEU Negative         Culture Results:    No results for input(s): CXSURG in the last 720 hours. Blood Culture Recent: No results for input(s): BC in the last 720 hours. Cultures:   No results for input(s): CULTURE in the last 72 hours. No results for input(s): BC, [place] in the last 72 hours. No results for input(s): CXSURG in the last 72 hours. Recent Labs     06/04/22  0355   MG 2.3     Recent Labs     06/04/22  0355   AST 21   ALT 20   BILITOT 0.3   ALKPHOS 92           Significant Diagnostic Studies:   CT HEAD WO CONTRAST    Result Date: 6/4/2022  NO PRIOR REPORT AVAILABLE Exam: CT OF THE BRAIN WITHOUT CONTRAST Clinical data: Dizziness. Technique: Contiguous axial images are obtained from the skull base to vertex without intravenous contrast.  Reformatted/MPR images were performed. Radiation dose: CTDIvol = 40.60 mGy, DLP = 737 mGy x cm. Prior studies: No prior studies submitted. Findings:  No acute intracranial abnormality is present. Age appropriate cerebral and cerebellar atrophy is noted. No evidence of acute cortical infarction, hemorrhage, mass or mass effect. No hydrocephalus or abnormal extra-axial fluid collections are present. The  posterior fossa is unremarkable. Physiological calcifications of falx are noted. The skull base and calvarium are intact. The included portions of the paranasal sinuses and mastoid air cells are clear. 1. Age appropriate cerebral and cerebellar atrophy. 2. No acute intracranial abnormality. Recommendation: Follow up as clinically indicated. All CT scans at this facility utilize dose modulation, iterative reconstruction, and/or weight based dosing when appropriate to reduce radiation dose to as low as reasonably achievable.  Electronically Signed by Rohan Pinzon MD at 04-Jun-2022 07:40:37 AM VL DUP CAROTID BILATERAL    Result Date: 6/4/2022  Vascular Carotid Procedure  Demographics   Patient Name    Oralia Hwang  Age                  80                  N   Patient Number  [id number]           Gender               Female   Visit Number    [id number]        Interpreting         Nabor Townsend                                   Physician   Date of Birth   11/27/1929       Referring Physician  Reanna Campbell   Accession       [id number]       [address] [place] [address] RVT  Number  Procedure Type of Study:   Cerebral:Carotid, VL CAROTID BILATERAL. Indications for Study:Syncope. Risk Factors   - The patient's risk factor(s) include: dyslipidemia and arterial     hypertension. Allergies   - Penicillins.   - Sulfa drugs.   - Other allergy:(Quinine). - Other allergy:(Tizanidine). Impression   There is calcific plaque visualized in the bilateral internal carotid  artery(ies). There is less than 50% stenosis in the right internal carotid artery. There is less than 50% stenosis of the left internal carotid artery. There is normal antegrade flow in the bilateral vertebral artery(ies). Signature   ----------------------------------------------------------------  Electronically signed by Neno Guzmán(Interpreting  physician) on 06/04/2022 05:44 PM  ----------------------------------------------------------------  Blood Pressure:Right arm 152/84 mmHg. Left arm 164/94 mmHg. Velocities are measured in cm/s ; Diameters are measured in mm Carotid Right Measurements +------------+-------+-------+--------+-------+------------+---------------+ ! Location    ! PSV    ! EDV    ! Angle   ! RI     !%Stenosis   ! Tortuosity     ! +------------+-------+-------+--------+-------+------------+---------------+ ! Prox CCA    !57.6   !11     !60      !0.81   !            !               ! +------------+-------+-------+--------+-------+------------+---------------+ ! Mid CCA     !62.7   !12.9   !60      !0.79 !            !               ! +------------+-------+-------+--------+-------+------------+---------------+ ! Prox ICA    !38.8   !7.6    ! 60      !0.8    ! !               ! +------------+-------+-------+--------+-------+------------+---------------+ ! Mid ICA     !63.1   !14.7   !60      !0.77   !            !               ! +------------+-------+-------+--------+-------+------------+---------------+ ! Dist ICA    !88.8   !18.7   !52      !0.79   !            !               ! +------------+-------+-------+--------+-------+------------+---------------+ ! Prox ECA    !67.8   !       !60      !       !            !               ! +------------+-------+-------+--------+-------+------------+---------------+ ! Vertebral   !76.8   !12.9   !60      !0.83   !            !               ! +------------+-------+-------+--------+-------+------------+---------------+   - There is antegrade vertebral flow noted on the right side. - Additional Measurements:ICAPSV/CCAPSV 1.54. ICAEDV/CCAEDV 1.7. Carotid Left Measurements +------------+-------+-------+--------+-------+------------+---------------+ ! Location    ! PSV    ! EDV    ! Angle   ! RI     !%Stenosis   ! Tortuosity     ! +------------+-------+-------+--------+-------+------------+---------------+ ! Prox CCA    !61.5   !9.8    ! 60      !0.84   !            !               ! +------------+-------+-------+--------+-------+------------+---------------+ ! Mid CCA     !74.6   !12.3   !60      !0.84   !            !               ! +------------+-------+-------+--------+-------+------------+---------------+ ! Prox ICA    !61.9   !12.3   !60      !0.8    ! !               ! +------------+-------+-------+--------+-------+------------+---------------+ ! Mid ICA     ! 54     !14.5   !46      !0.73   !            !               ! +------------+-------+-------+--------+-------+------------+---------------+ ! Dist ICA    ! 64.5   !16.7   !46      !0.74   !            ! ! +------------+-------+-------+--------+-------+------------+---------------+ ! Prox ECA    !71.6   !       !60      !       !            !               ! +------------+-------+-------+--------+-------+------------+---------------+ ! Vertebral   !48.7   !13.2   ! 60      !0.73   !            !               ! +------------+-------+-------+--------+-------+------------+---------------+   - There is antegrade vertebral flow noted on the left side. - Additional Measurements:ICAPSV/CCAPSV 1.05. ICAEDV/CCAEDV 1.7. ECHO 2D WO COLOR DOPPLER COMPLETE    Result Date: 6/4/2022  Transthoracic Echocardiography Report (TTE)  Demographics   Patient Name  Estrellita Correa  Date of Study         06/04/2022                N   MRN           [id number]           Gender                Female   Date of Birth 11/27/1929       Room Number           [facility]   Age           80 year(s)   Height:       68 inches        Referring Physician   Juanna Saint   Weight:       155 pounds       Sonographer           Karla Bracket   BSA:          1.83 m^2         Interpreting          Kelsey Earl MD                                 Physician   BMI:          23.57 kg/m^2  Procedure Type of Study   TTE procedure:ECHOCARDIOGRAM COMPLETE 2D WO COLOR DOPPLER. Study Location: Echo Lab Technical Quality: Good visualization Patient Status: Inpatient HR: 58 bpm BP: 140/62 mmHg Indications:Dyspnea/SOB. Conclusions   Summary  Left ventricle normal chamber size and thickness  Grade 1 diastolic dysfunction  Preserved systolic function with estimated ejection fraction of 64%  No regional wall motion abnormality  Mitral annulus calcification. Mild mitral valve thickening with normal  mobility.  Mild mitral regurgitation   Signature   ----------------------------------------------------------------  Electronically signed by GINO DawkinsInterpreting physician)  on 06/04/2022 01:43 PM  ---------------------------------------------------------------- Findings   Mitral Valve  Mitral annulus calcification. Mild mitral valve thickening with normal  mobility. Mild mitral regurgitation   Aortic Valve  Structurally normal aortic valve. Tricuspid Valve  Mild tricuspid insufficiency with estimated right ventricular systolic  pressure of 32 mm which is normal   Pulmonic Valve  The pulmonic valve was not well visualized . Left Atrium  Normal size left atrium. Left Ventricle  See conclusion   Right Atrium  Normal right atrial dimension with no evidence of thrombus or mass noted. Right Ventricle  Normal right ventricular size with preserved RV function. Miscellaneous  Normal IVC  Allergies   - Penicillins.   - Sulfa drugs.   - Other allergy:(Quinine). - Other allergy:(Tizanidine). 2D Measurements and Calculations(cm)   LVIDd: 4.27 cm                      LVIDs: 2.81 cm  IVSd: 0.92 cm  LVPWd: 1.06 cm                      AO Root Dimension: 2 cm  Rt. Vent.  Dimension: 2.26 cm        LA Dimension: 2.8 cm  % Ejection Fraction: 63.5 %         LA Area: 16.9 cm^2  LA Volume: 51.4 ml                  LV Systolic dimension: 9.78ZP  LA Volume Index: 28 ml/m^2          LV PW diastolic: 7.91UX  LV dimension: 4.27 cm               LVOT diameter: 2.3 cm                                      RV Diastolic dimension: 3.03DG   Cardic Output (CO): 8.38l/min  Ascending Aorta: 3 cm  Doppler Measurements and Calculations   AV Peak Velocity:138 cm/s              MV Peak E-Wave: 99.4 cm/s  AV Mean Velocity:95.4 cm/s             MV Peak A-Wave: 108 cm/s  AV Peak Gradient: 7.62 mmHg            MV E/A Ratio: 0.92 %  AV Mean Gradient: 4 mmHg               MV Mean velocity: 78.5cm/s  AV Area (Continuity):4.43 cm^2         MV Peak Gradient: 3.95 mmHg  AV VTI:32.6 cm/s                       MV Mean gradient: 3 mmHg  TR Velocity:252 cm/s                   MV P1/2t: 118 msec  TR Gradient:25.4 mmHg                  MVA by PHT1.86 cm^2   MV E' septal velocity: 7.4cm/s  MV E' lateral velocity:8.05 cm/s            Hospital Course:   Ms Neymar Krishnamurthy, a 80-year-old female, history of syncopal episodes, HTN, presenting to [address] [place] [address] ED (06/04/2022), on account of dizziness/lightheadedness and near syncope. Patient has reportedly had a similar episode in the past with an extensive work-up. Last episode was reportedly over a year ago. No further syncopal episodes while in house at [address] [place] [address]. Patient was maintained on telemetry throughout hospital course. 2D echocardiogram with normal LV chamber size and thickness with estimated EF of 64%. Grade 1 diastolic dysfunction. Current medications reviewed. Zio patch replaced upon discharge  Patient clinically stable for discharge  Follow-up with PCP outpatient  Patient advised to report to the emergency department if symptoms progress prior to follow-up visit. Physical Exam:  Vital Signs: BP (!) 154/76   Pulse 61   Temp 98.1 °F (36.7 °C)   Resp 18   Ht 5' 8\" (1.727 m)   Wt 155 lb (70.3 kg)   SpO2 97%   BMI 23.57 kg/m²   Physical Exam  Vitals and nursing note reviewed. Constitutional:       General: She is not in acute distress. Appearance: Normal appearance. She is not ill-appearing, toxic-appearing or diaphoretic. HENT:      Head: Normocephalic and atraumatic. Right Ear: External ear normal.      Left Ear: External ear normal.      Nose: Nose normal. No congestion or rhinorrhea. Mouth/Throat:      Mouth: Mucous membranes are moist.      Pharynx: Oropharynx is clear. No oropharyngeal exudate or posterior oropharyngeal erythema. Eyes:      General: No scleral icterus. Right eye: No discharge. Left eye: No discharge. Extraocular Movements: Extraocular movements intact. Conjunctiva/sclera: Conjunctivae normal.      Pupils: Pupils are equal, round, and reactive to light. Cardiovascular:      Rate and Rhythm: Normal rate and regular rhythm. Pulses: Normal pulses. Heart sounds: Normal heart sounds.  No murmur heard.  No friction rub. No gallop. Pulmonary:      Effort: Pulmonary effort is normal. No respiratory distress. Breath sounds: Normal breath sounds. No stridor. No wheezing, rhonchi or rales. Chest:      Chest wall: No tenderness. Abdominal:      General: Bowel sounds are normal. There is no distension. Palpations: Abdomen is soft. Tenderness: There is no abdominal tenderness. There is no guarding or rebound. Musculoskeletal:         General: No swelling, tenderness, deformity or signs of injury. Normal range of motion. Cervical back: Normal range of motion and neck supple. No rigidity. No muscular tenderness. Right lower leg: No edema. Left lower leg: No edema. Skin:     General: Skin is warm and dry. Capillary Refill: Capillary refill takes less than 2 seconds. Coloration: Skin is not jaundiced or pale. Findings: No bruising, erythema, lesion or rash. Neurological:      General: No focal deficit present. Mental Status: She is alert and oriented to person, place, and time. Cranial Nerves: No cranial nerve deficit. Sensory: No sensory deficit. Motor: No weakness. Coordination: Coordination normal.   Psychiatric:         Mood and Affect: Mood normal.         Behavior: Behavior normal.         Thought Content: Thought content normal.         Judgment: Judgment normal.         Discharge Medications:        Medication List      CONTINUE taking these medications    amLODIPine 2.5 MG tablet  Commonly known as: NORVASC     Biotin 1000 MCG Tabs     clobetasol 0.05 % ointment  Commonly known as: Temovate  Apply topically prn     colestipol 1 g tablet  Commonly known as: COLESTID     donepezil 5 MG tablet  Commonly known as: ARICEPT     Estradiol 10 MCG Tabs vaginal tablet  Commonly known as: VAGIFEM  .      Potassium 99 MG Tabs     pregabalin 150 MG capsule  Commonly known as: LYRICA     Premarin 0.625 MG/GM vaginal cream  Generic drug: conjugated estrogens  INSERT 0.5 GRAMS VAGINALLY TWICE A WEEK     PROBIOTIC DAILY PO     TUMS PO     vitamin B-12 100 MCG tablet  Commonly known as: CYANOCOBALAMIN     vitamin D3 25 MCG (1000 UT) Tabs tablet  Commonly known as: CHOLECALCIFEROL        STOP taking these medications    Ambien 5 MG tablet  Generic drug: zolpidem     oxybutynin 5 MG extended release tablet  Commonly known as: Ditropan XL              Discharge Instructions: Follow up with Avril Anglin DO in 7 days. Take medications as directed. Resume activity as tolerated. Recommended Follow Up:  No follow-up provider specified. Followup Appointments Scheduled at Time of Discharge:  No future appointments. Diet: ADULT DIET; Regular        DISCHARGE STATUS:    Condition on Discharge: stable  Disposition: Patient is medically stable and will be discharged Home      Extended Emergency Contact Information  Primary Emergency Contact: Nicole Desai [address] Phone: [phone]  Relation: Child         Time Spent on discharge is  34 mins in the examination, evaluation, counseling and review of medications and discharge plan. Electronically signed by   Hiram Gordon MD, MPH, MD,   Internal Medicine Hospitalist   6/5/2022 10:08 AM      Thank you Avril Anglin DO for the opportunity to be involved in this patient's care. If you have any questions or concerns please feel free to contact me at [phone]        EMR Dragon/Transcription disclaimer:   Much of this encounter note is an electronic transcription/translation of spoken language to printed text.  The electronic translation of spoken language may permit erroneous, or at times, nonsensical words or phrases to be inadvertently transcribed; although attempts have made to review the note for such errors, some may still exist.

## 2022-06-05 NOTE — PLAN OF CARE
Problem: Discharge Planning  Goal: Discharge to home or other facility with appropriate resources  6/5/2022 1052 by Kyree Hilton RN  Outcome: Progressing  Flowsheets (Taken 6/5/2022 0844)  Discharge to home or other facility with appropriate resources: Identify barriers to discharge with patient and caregiver  6/5/2022 0336 by Lani Reynolds LPN  Outcome: Progressing     Problem: Safety - Adult  Goal: Free from fall injury  6/5/2022 1052 by Kyree Hilton RN  Outcome: [place] (Taken 6/5/2022 0844)  Free From Fall Injury: Instruct family/caregiver on patient safety  6/5/2022 0336 by Lani Reynolds LPN  Outcome: Progressing     Problem: ABCDS Injury Assessment  Goal: Absence of physical injury  6/5/2022 1052 by Kyree Hilton RN  Outcome: Progressing  Flowsheets (Taken 6/5/2022 0844)  Absence of Physical Injury: Implement safety measures based on patient assessment  6/5/2022 0336 by Lani Reynolds LPN  Outcome: Progressing

## 2022-06-05 NOTE — PLAN OF CARE
Problem: Discharge Planning  Goal: Discharge to home or other facility with appropriate resources  6/5/2022 1054 by Herlinda Bernal RN  Outcome: Completed  6/5/2022 1052 by Herlinda Bernal RN  Outcome: Progressing  Flowsheets (Taken 6/5/2022 0844)  Discharge to home or other facility with appropriate resources: Identify barriers to discharge with patient and caregiver  6/5/2022 0336 by Lani Reynolds LPN  Outcome: Progressing     Problem: Safety - Adult  Goal: Free from fall injury  6/5/2022 1054 by Herlinda Bernal RN  Outcome: Completed  6/5/2022 1052 by Herlinda Bernal RN  Outcome: [place] (Taken 6/5/2022 0844)  Free From Fall Injury: Instruct family/caregiver on patient safety  6/5/2022 0336 by Lani Reynolds LPN  Outcome: Progressing     Problem: ABCDS Injury Assessment  Goal: Absence of physical injury  6/5/2022 1054 by Herlinda Bernal RN  Outcome: Completed  6/5/2022 1052 by Herlinda Bernal RN  Outcome: Progressing  Flowsheets (Taken 6/5/2022 0844)  Absence of Physical Injury: Implement safety measures based on patient assessment  6/5/2022 0336 by Lani Reynolds LPN  Outcome: Progressing

## 2022-06-06 ENCOUNTER — CARE COORDINATION (OUTPATIENT)
Dept: CASE MANAGEMENT | Age: 87
End: 2022-06-06

## 2022-06-06 NOTE — CARE COORDINATION
[facility] Transitions Initial Follow Up Call    Call within 2 business days of discharge: Yes    Patient: Maggie Dash Patient : 1929   MRN: [id number]  Reason for Admission: Dizziness  Discharge Date: 22 RARS: No data recorded    Last Discharge [facility]       Complaint Diagnosis Description Type Department Provider    22 Dizziness Dizziness . .. ED to Hosp-Admission (Discharged) (ADMITTED) [facility] Drake Turner MD; Kaushik Flores. .. Spoke with: Kesha 49: [facility]    Non-face-to-face services provided:  Reviewed encounter information for continuity of care prior to follow up Care Transitions Coordination phone call - chart notes, consults, progress notes, test results, med list, appointments, AVS, other information. Care Transitions 24 Hour Call    Schedule Follow Up Appointment with PCP: Declined  Do you have a copy of your discharge instructions?: Yes  Do you have all of your prescriptions and are they filled?: Yes  Have you been contacted by a Talmage Hashimoto Pharmacist?: No  Have you scheduled your follow up appointment?: No  Do you feel like you have everything you need to keep you well at home?: Yes  Care Transitions Interventions         Follow Up  No future appointments. Placed a call to the number listed for patient for an initial follow up call for Care Transitions Coordination following discharge from the hospital.  Introduced myself and explained the purpose of my call as well as verifying name, date of birth of patient. Spoke with patient regarding hospitalization, discharge and status thus far. She reported that she is doing very well. She denied any ongoing symptoms. She said she does have the zio patch on. She said she had one episode a few minutes ago where she thinks she moved too fast and she got just a little bit dizzy, but nothing like what she had been before coming into the hospital.  She said she will log it. Other than that, no symptoms. No other dizziness, no shortness of breath, palpitations, etc.  She said she is sleeping well, tolerating activity well. She said she took a hiking stick to the bedroom with her last night in case she needed it when she got up, but she did not. She is eating well, appetite is good. She is drinking well, trying to increase fluid intake some as she said she might not be drinking enough. She said she has all of her meds and is taking them as ordered. She did not want to do a med review because she does not have her list updated she said. She did say though that once she gets that done, she will email a copy of it to me and then I can do whatever I need to do with it. She did mention Metoprolol and a couple of others that are not on her list in Epic. I did ask about these and again, she said she would email it to me. I did give her my email address. She is aware of stopping Ambien and Oxybutynin. She said she had actually already stopped the Oxybutynin prior to coming to the hospital.  She has not called to make the hospital follow up appointment as yet. She said she was getting ready to do that when the phone rang with my call. She said she has a regular appointment scheduled for mid July, but will make one, probably with the PA. She is wearing the Zio Patch. We discussed mailing it back in when appropriate, awaiting the report to be sent in and getting results from provider. Discussed activity needs, diet, etc.  Briefly discussed hospital stay. Informed/reminded of Care Transitions Coordination process, purpose, future calls, etc and is agreeable with appropriate follow up. Ensured to have CTN contact information to be used as needed. Encouraged to call for new/ongoing issues, questions, concerns, etc.  Encouraged to make contact with PCP as indicated for any further needs as well. Given the opportunity to ask questions and answered appropriately.   CTN to follow up as indicated. Transitions of Care Initial Call    Was this an external facility discharge? No    Challenges to be reviewed by the provider   Additional needs identified to be addressed with provider: No       Method of communication with provider : none    Advance Care Planning:   Does patient have an Advance Directive: reviewed and current. Advance Care Planning   Healthcare Decision Maker:    Primary Decision Maker: Jing Badillo Child - [phone]    Care Transition Nurse contacted the patient by telephone to perform post hospital discharge assessment. Verified name and  with patient as identifiers. Provided introduction to self, and explanation of the CTN role. CTN reviewed discharge instructions, medical action plan and red flags with patient who verbalized understanding. Patient given an opportunity to ask questions and does not have any further questions or concerns at this time. Were discharge instructions available to patient? Yes. Reviewed appropriate site of care based on symptoms and resources available to patient including: PCP. The patient agrees to contact the PCP office for questions related to their healthcare. Medication reconciliation was not performed with patient, declined. She will email an updated list to me so GTx list can be updated. She verbalizes understanding of administration of home medications. CTN provided contact information. Plan for follow-up call in 3-5 days based on severity of symptoms and risk factors.   Plan for next call: - Plan for next call - assess overall status, pain, appetite, sleep patterns, abnormal signs and symptoms, availability and effectiveness of medications, activity level and tolerance, falls/other unexpected events, findings from follow up appointments, medication/treatment changes, any additional teaching needs, ie education regarding self care mgmt - disease process mgmt, symptom mgmt, diet/hydration, pain control needs, medication mgmt, activity level, fall precautions & home safety needs, infection control, seeking medical attention, who/when to call prn any needs, etc.    Liza Cruz RN

## 2022-06-09 ENCOUNTER — CARE COORDINATION (OUTPATIENT)
Dept: CASE MANAGEMENT | Age: 87
End: 2022-06-09

## 2022-06-09 NOTE — CARE COORDINATION
[facility] Transitions Follow Up Call    2022    Patient: Jeremías Kay  Patient : 1929   MRN: [id number]  Reason for Admission:   Discharge Date: 22 RARS: No data recorded       Attempted to contact patient for follow up transition call. Left voicemail message to return call with an update on condition since discharge. Contact information provided. Will continue to follow up. Care Transitions Subsequent and Final Call    Subsequent and Final Calls  Care Transitions Interventions  Other Interventions: Follow Up  No future appointments.     Haritha Larson LPN

## 2022-06-15 ENCOUNTER — CARE COORDINATION (OUTPATIENT)
Dept: CASE MANAGEMENT | Age: 87
End: 2022-06-15

## 2022-06-15 NOTE — CARE COORDINATION
[facility] Transitions Follow Up Call    6/15/2022    Patient: Darrin Martell  Patient : 1929   MRN: [id number]    Discharge Date: 22 RARS: No data recorded       Spoke with: N/A    Care Transitions Subsequent and Final Call    Subsequent and Final Calls  Care Transitions Interventions  Other Interventions: Follow Up  No future appointments. Attempted to make contact with patient/caregiver for a routine Care Transitions Coordination follow up call without success. Unable to leave a HIPAA compliant message regarding intent of call and call back information. As this repeated attempt to make contact was unsuccessful, will disengage at this time.         Caitlin Campbell RN

## 2022-06-26 PROCEDURE — 93248 EXT ECG>7D<15D REV&INTERPJ: CPT | Performed by: INTERNAL MEDICINE

## 2022-06-26 NOTE — PROCEDURES
[facility] / Holter Monitor Report      Annalise Hughes    : 1929      Test Date: 2022    Analysis Date: 2022    Date Interpreted: 2022        1. Nearly 13 days and 7 hours of rhythm are processed. 2.  The underlying rhythm is normal sinus rhythm at a mean heart rate of 75 bpm, with a range of 46 to 127 bpm.    3.  No triggered events. 4.  First-degree AV block with bundle branch block/IVCD present. No other significant heart block or pauses noted. 5.  Brief episode of 4 beats of ventricular tachycardia noted. Cannot rule out aberrancy. 6.  2969 episodes of supraventricular tachycardia occurred. Some episodes were sustained with the longest episode lasting 1 minute and 36 seconds at a maximum rate of 207 bpm (average 147 bpm). This was also the fastest episode. 7.  No atrial fibrillation noted. 8.  Isolated supraventricular ectopics were frequent (9.2% of time), with rare couplets and triplets. Isolated ventricular ectopics were rare (less than 1%) with rare couplets and triplets. Ventricular bigeminy and trigeminy were present.     Electronically signed by Ena Vegas MD on 22

## 2023-03-05 ENCOUNTER — HOSPITAL ENCOUNTER (INPATIENT)
Facility: HOSPITAL | Age: 88
LOS: 8 days | Discharge: HOME | DRG: 329 | End: 2023-03-13
Attending: STUDENT IN AN ORGANIZED HEALTH CARE EDUCATION/TRAINING PROGRAM | Admitting: SPECIALIST
Payer: MEDICARE

## 2023-03-05 ENCOUNTER — APPOINTMENT (OUTPATIENT)
Dept: GENERAL RADIOLOGY | Facility: HOSPITAL | Age: 88
DRG: 329 | End: 2023-03-05
Payer: MEDICARE

## 2023-03-05 ENCOUNTER — APPOINTMENT (OUTPATIENT)
Dept: CT IMAGING | Facility: HOSPITAL | Age: 88
DRG: 329 | End: 2023-03-05
Payer: MEDICARE

## 2023-03-05 DIAGNOSIS — K56.600 PARTIAL SMALL BOWEL OBSTRUCTION: ICD-10-CM

## 2023-03-05 DIAGNOSIS — R10.31 ABDOMINAL PAIN, ACUTE, RIGHT LOWER QUADRANT: ICD-10-CM

## 2023-03-05 DIAGNOSIS — R19.4 DECREASED STOOLING: ICD-10-CM

## 2023-03-05 DIAGNOSIS — Z74.09 IMPAIRED MOBILITY: ICD-10-CM

## 2023-03-05 DIAGNOSIS — K56.609 SMALL BOWEL OBSTRUCTION: Primary | ICD-10-CM

## 2023-03-05 DIAGNOSIS — Z90.49 HISTORY OF CHOLECYSTECTOMY: ICD-10-CM

## 2023-03-05 LAB
ALBUMIN SERPL-MCNC: 4.6 G/DL (ref 3.5–5.2)
ALBUMIN/GLOB SERPL: 1.5 G/DL
ALP SERPL-CCNC: 86 U/L (ref 39–117)
ALT SERPL W P-5'-P-CCNC: 20 U/L (ref 1–33)
ANION GAP SERPL CALCULATED.3IONS-SCNC: 14 MMOL/L (ref 5–15)
AST SERPL-CCNC: 19 U/L (ref 1–32)
BASOPHILS # BLD AUTO: 0.07 10*3/MM3 (ref 0–0.2)
BASOPHILS NFR BLD AUTO: 0.5 % (ref 0–1.5)
BILIRUB SERPL-MCNC: 0.2 MG/DL (ref 0–1.2)
BILIRUB UR QL STRIP: NEGATIVE
BUN SERPL-MCNC: 21 MG/DL (ref 8–23)
BUN/CREAT SERPL: 28 (ref 7–25)
CALCIUM SPEC-SCNC: 9.7 MG/DL (ref 8.2–9.6)
CHLORIDE SERPL-SCNC: 95 MMOL/L (ref 98–107)
CLARITY UR: ABNORMAL
CO2 SERPL-SCNC: 24 MMOL/L (ref 22–29)
COLOR UR: YELLOW
CREAT SERPL-MCNC: 0.75 MG/DL (ref 0.57–1)
D-LACTATE SERPL-SCNC: 1.7 MMOL/L (ref 0.5–2)
DEPRECATED RDW RBC AUTO: 44.4 FL (ref 37–54)
EGFRCR SERPLBLD CKD-EPI 2021: 74.3 ML/MIN/1.73
EOSINOPHIL # BLD AUTO: 0.25 10*3/MM3 (ref 0–0.4)
EOSINOPHIL NFR BLD AUTO: 1.7 % (ref 0.3–6.2)
ERYTHROCYTE [DISTWIDTH] IN BLOOD BY AUTOMATED COUNT: 14 % (ref 12.3–15.4)
GLOBULIN UR ELPH-MCNC: 3.1 GM/DL
GLUCOSE SERPL-MCNC: 170 MG/DL (ref 65–99)
GLUCOSE UR STRIP-MCNC: NEGATIVE MG/DL
HCT VFR BLD AUTO: 39.1 % (ref 34–46.6)
HGB BLD-MCNC: 13.1 G/DL (ref 12–15.9)
HGB UR QL STRIP.AUTO: NEGATIVE
IMM GRANULOCYTES # BLD AUTO: 0.05 10*3/MM3 (ref 0–0.05)
IMM GRANULOCYTES NFR BLD AUTO: 0.3 % (ref 0–0.5)
KETONES UR QL STRIP: NEGATIVE
LEUKOCYTE ESTERASE UR QL STRIP.AUTO: NEGATIVE
LIPASE SERPL-CCNC: 49 U/L (ref 13–60)
LYMPHOCYTES # BLD AUTO: 1.28 10*3/MM3 (ref 0.7–3.1)
LYMPHOCYTES NFR BLD AUTO: 8.8 % (ref 19.6–45.3)
MCH RBC QN AUTO: 29 PG (ref 26.6–33)
MCHC RBC AUTO-ENTMCNC: 33.5 G/DL (ref 31.5–35.7)
MCV RBC AUTO: 86.5 FL (ref 79–97)
MONOCYTES # BLD AUTO: 0.72 10*3/MM3 (ref 0.1–0.9)
MONOCYTES NFR BLD AUTO: 5 % (ref 5–12)
NEUTROPHILS NFR BLD AUTO: 12.15 10*3/MM3 (ref 1.7–7)
NEUTROPHILS NFR BLD AUTO: 83.7 % (ref 42.7–76)
NITRITE UR QL STRIP: NEGATIVE
NRBC BLD AUTO-RTO: 0 /100 WBC (ref 0–0.2)
PH UR STRIP.AUTO: 8.5 [PH] (ref 5–8)
PLATELET # BLD AUTO: 354 10*3/MM3 (ref 140–450)
PMV BLD AUTO: 9.3 FL (ref 6–12)
POTASSIUM SERPL-SCNC: 3.7 MMOL/L (ref 3.5–5.2)
PROT SERPL-MCNC: 7.7 G/DL (ref 6–8.5)
PROT UR QL STRIP: NEGATIVE
RBC # BLD AUTO: 4.52 10*6/MM3 (ref 3.77–5.28)
SODIUM SERPL-SCNC: 133 MMOL/L (ref 136–145)
SP GR UR STRIP: 1.01 (ref 1–1.03)
UROBILINOGEN UR QL STRIP: ABNORMAL
WBC NRBC COR # BLD: 14.52 10*3/MM3 (ref 3.4–10.8)

## 2023-03-05 PROCEDURE — 25010000002 ONDANSETRON PER 1 MG: Performed by: STUDENT IN AN ORGANIZED HEALTH CARE EDUCATION/TRAINING PROGRAM

## 2023-03-05 PROCEDURE — 36415 COLL VENOUS BLD VENIPUNCTURE: CPT

## 2023-03-05 PROCEDURE — 74018 RADEX ABDOMEN 1 VIEW: CPT

## 2023-03-05 PROCEDURE — 74177 CT ABD & PELVIS W/CONTRAST: CPT

## 2023-03-05 PROCEDURE — 83690 ASSAY OF LIPASE: CPT | Performed by: STUDENT IN AN ORGANIZED HEALTH CARE EDUCATION/TRAINING PROGRAM

## 2023-03-05 PROCEDURE — 85025 COMPLETE CBC W/AUTO DIFF WBC: CPT | Performed by: STUDENT IN AN ORGANIZED HEALTH CARE EDUCATION/TRAINING PROGRAM

## 2023-03-05 PROCEDURE — 99285 EMERGENCY DEPT VISIT HI MDM: CPT

## 2023-03-05 PROCEDURE — 25010000002 MORPHINE PER 10 MG: Performed by: STUDENT IN AN ORGANIZED HEALTH CARE EDUCATION/TRAINING PROGRAM

## 2023-03-05 PROCEDURE — 25510000001 IOPAMIDOL 61 % SOLUTION: Performed by: STUDENT IN AN ORGANIZED HEALTH CARE EDUCATION/TRAINING PROGRAM

## 2023-03-05 PROCEDURE — 93010 ELECTROCARDIOGRAM REPORT: CPT | Performed by: INTERNAL MEDICINE

## 2023-03-05 PROCEDURE — 93005 ELECTROCARDIOGRAM TRACING: CPT | Performed by: STUDENT IN AN ORGANIZED HEALTH CARE EDUCATION/TRAINING PROGRAM

## 2023-03-05 PROCEDURE — 83605 ASSAY OF LACTIC ACID: CPT | Performed by: STUDENT IN AN ORGANIZED HEALTH CARE EDUCATION/TRAINING PROGRAM

## 2023-03-05 PROCEDURE — 93005 ELECTROCARDIOGRAM TRACING: CPT

## 2023-03-05 PROCEDURE — 80053 COMPREHEN METABOLIC PANEL: CPT | Performed by: STUDENT IN AN ORGANIZED HEALTH CARE EDUCATION/TRAINING PROGRAM

## 2023-03-05 PROCEDURE — P9612 CATHETERIZE FOR URINE SPEC: HCPCS

## 2023-03-05 PROCEDURE — 25010000002 HYDROMORPHONE PER 4 MG: Performed by: STUDENT IN AN ORGANIZED HEALTH CARE EDUCATION/TRAINING PROGRAM

## 2023-03-05 PROCEDURE — 81003 URINALYSIS AUTO W/O SCOPE: CPT | Performed by: STUDENT IN AN ORGANIZED HEALTH CARE EDUCATION/TRAINING PROGRAM

## 2023-03-05 RX ORDER — SODIUM CHLORIDE, SODIUM LACTATE, POTASSIUM CHLORIDE, CALCIUM CHLORIDE 600; 310; 30; 20 MG/100ML; MG/100ML; MG/100ML; MG/100ML
125 INJECTION, SOLUTION INTRAVENOUS CONTINUOUS
Status: DISCONTINUED | OUTPATIENT
Start: 2023-03-05 | End: 2023-03-09

## 2023-03-05 RX ORDER — HYDROMORPHONE HYDROCHLORIDE 1 MG/ML
0.5 INJECTION, SOLUTION INTRAMUSCULAR; INTRAVENOUS; SUBCUTANEOUS ONCE AS NEEDED
Status: COMPLETED | OUTPATIENT
Start: 2023-03-05 | End: 2023-03-05

## 2023-03-05 RX ORDER — ONDANSETRON 2 MG/ML
4 INJECTION INTRAMUSCULAR; INTRAVENOUS ONCE
Status: COMPLETED | OUTPATIENT
Start: 2023-03-05 | End: 2023-03-05

## 2023-03-05 RX ORDER — METOPROLOL SUCCINATE 25 MG/1
25 TABLET, EXTENDED RELEASE ORAL DAILY
COMMUNITY

## 2023-03-05 RX ORDER — TRAZODONE HYDROCHLORIDE 50 MG/1
50 TABLET ORAL NIGHTLY
COMMUNITY

## 2023-03-05 RX ORDER — SODIUM CHLORIDE 0.9 % (FLUSH) 0.9 %
10 SYRINGE (ML) INJECTION AS NEEDED
Status: DISCONTINUED | OUTPATIENT
Start: 2023-03-05 | End: 2023-03-13 | Stop reason: HOSPADM

## 2023-03-05 RX ORDER — ONDANSETRON 2 MG/ML
4 INJECTION INTRAMUSCULAR; INTRAVENOUS EVERY 4 HOURS PRN
Status: DISCONTINUED | OUTPATIENT
Start: 2023-03-05 | End: 2023-03-13 | Stop reason: HOSPADM

## 2023-03-05 RX ADMIN — HYDROMORPHONE HYDROCHLORIDE 0.5 MG: 1 INJECTION, SOLUTION INTRAMUSCULAR; INTRAVENOUS; SUBCUTANEOUS at 21:34

## 2023-03-05 RX ADMIN — SODIUM CHLORIDE, POTASSIUM CHLORIDE, SODIUM LACTATE AND CALCIUM CHLORIDE 500 ML: 600; 310; 30; 20 INJECTION, SOLUTION INTRAVENOUS at 19:58

## 2023-03-05 RX ADMIN — TOPICAL ANESTHETIC 2 SPRAY: 200 SPRAY DENTAL; PERIODONTAL at 22:20

## 2023-03-05 RX ADMIN — ONDANSETRON 4 MG: 2 INJECTION INTRAMUSCULAR; INTRAVENOUS at 19:55

## 2023-03-05 RX ADMIN — MORPHINE SULFATE 4 MG: 4 INJECTION, SOLUTION INTRAMUSCULAR; INTRAVENOUS at 19:53

## 2023-03-05 RX ADMIN — IOPAMIDOL 100 ML: 612 INJECTION, SOLUTION INTRAVENOUS at 21:06

## 2023-03-06 ENCOUNTER — PREP FOR SURGERY (OUTPATIENT)
Dept: OTHER | Facility: HOSPITAL | Age: 88
End: 2023-03-06
Payer: MEDICARE

## 2023-03-06 ENCOUNTER — ANESTHESIA (OUTPATIENT)
Dept: PERIOP | Facility: HOSPITAL | Age: 88
DRG: 329 | End: 2023-03-06
Payer: MEDICARE

## 2023-03-06 ENCOUNTER — APPOINTMENT (OUTPATIENT)
Dept: GENERAL RADIOLOGY | Facility: HOSPITAL | Age: 88
DRG: 329 | End: 2023-03-06
Payer: MEDICARE

## 2023-03-06 ENCOUNTER — ANESTHESIA EVENT (OUTPATIENT)
Dept: PERIOP | Facility: HOSPITAL | Age: 88
DRG: 329 | End: 2023-03-06
Payer: MEDICARE

## 2023-03-06 DIAGNOSIS — K56.600 PARTIAL SMALL BOWEL OBSTRUCTION: Primary | ICD-10-CM

## 2023-03-06 LAB
ABO GROUP BLD: NORMAL
ANION GAP SERPL CALCULATED.3IONS-SCNC: 13 MMOL/L (ref 5–15)
BLD GP AB SCN SERPL QL: NEGATIVE
BUN SERPL-MCNC: 19 MG/DL (ref 8–23)
BUN/CREAT SERPL: 27.1 (ref 7–25)
CALCIUM SPEC-SCNC: 9.3 MG/DL (ref 8.2–9.6)
CHLORIDE SERPL-SCNC: 96 MMOL/L (ref 98–107)
CO2 SERPL-SCNC: 27 MMOL/L (ref 22–29)
CREAT SERPL-MCNC: 0.7 MG/DL (ref 0.57–1)
DEPRECATED RDW RBC AUTO: 46.2 FL (ref 37–54)
EGFRCR SERPLBLD CKD-EPI 2021: 80.8 ML/MIN/1.73
ERYTHROCYTE [DISTWIDTH] IN BLOOD BY AUTOMATED COUNT: 14.2 % (ref 12.3–15.4)
GLUCOSE BLDC GLUCOMTR-MCNC: 166 MG/DL (ref 70–130)
GLUCOSE BLDC GLUCOMTR-MCNC: 180 MG/DL (ref 70–130)
GLUCOSE SERPL-MCNC: 145 MG/DL (ref 65–99)
HCT VFR BLD AUTO: 38.2 % (ref 34–46.6)
HGB BLD-MCNC: 12.5 G/DL (ref 12–15.9)
MAGNESIUM SERPL-MCNC: 2 MG/DL (ref 1.7–2.3)
MCH RBC QN AUTO: 28.7 PG (ref 26.6–33)
MCHC RBC AUTO-ENTMCNC: 32.7 G/DL (ref 31.5–35.7)
MCV RBC AUTO: 87.8 FL (ref 79–97)
PHOSPHATE SERPL-MCNC: 4.4 MG/DL (ref 2.5–4.5)
PLATELET # BLD AUTO: 347 10*3/MM3 (ref 140–450)
PMV BLD AUTO: 9.4 FL (ref 6–12)
POTASSIUM SERPL-SCNC: 3.8 MMOL/L (ref 3.5–5.2)
RBC # BLD AUTO: 4.35 10*6/MM3 (ref 3.77–5.28)
RH BLD: POSITIVE
SODIUM SERPL-SCNC: 136 MMOL/L (ref 136–145)
T&S EXPIRATION DATE: NORMAL
WBC NRBC COR # BLD: 13.06 10*3/MM3 (ref 3.4–10.8)

## 2023-03-06 PROCEDURE — 86901 BLOOD TYPING SEROLOGIC RH(D): CPT | Performed by: SPECIALIST

## 2023-03-06 PROCEDURE — 88307 TISSUE EXAM BY PATHOLOGIST: CPT | Performed by: SPECIALIST

## 2023-03-06 PROCEDURE — 93010 ELECTROCARDIOGRAM REPORT: CPT | Performed by: INTERNAL MEDICINE

## 2023-03-06 PROCEDURE — 25010000002 ONDANSETRON PER 1 MG: Performed by: SPECIALIST

## 2023-03-06 PROCEDURE — 83735 ASSAY OF MAGNESIUM: CPT | Performed by: SPECIALIST

## 2023-03-06 PROCEDURE — 99223 1ST HOSP IP/OBS HIGH 75: CPT | Performed by: SPECIALIST

## 2023-03-06 PROCEDURE — 84100 ASSAY OF PHOSPHORUS: CPT | Performed by: SPECIALIST

## 2023-03-06 PROCEDURE — 44120 REMOVAL OF SMALL INTESTINE: CPT | Performed by: SPECIALIST

## 2023-03-06 PROCEDURE — 25010000002 FENTANYL CITRATE (PF) 50 MCG/ML SOLUTION: Performed by: ANESTHESIOLOGY

## 2023-03-06 PROCEDURE — 86850 RBC ANTIBODY SCREEN: CPT | Performed by: SPECIALIST

## 2023-03-06 PROCEDURE — 25010000002 ONDANSETRON PER 1 MG: Performed by: NURSE ANESTHETIST, CERTIFIED REGISTERED

## 2023-03-06 PROCEDURE — 25010000002 CEFOXITIN PER 1 G: Performed by: SPECIALIST

## 2023-03-06 PROCEDURE — 25010000002 MORPHINE PER 10 MG: Performed by: SPECIALIST

## 2023-03-06 PROCEDURE — 74250 X-RAY XM SM INT 1CNTRST STD: CPT

## 2023-03-06 PROCEDURE — 25010000002 FENTANYL CITRATE (PF) 100 MCG/2ML SOLUTION: Performed by: NURSE ANESTHETIST, CERTIFIED REGISTERED

## 2023-03-06 PROCEDURE — 0DNU4ZZ RELEASE OMENTUM, PERCUTANEOUS ENDOSCOPIC APPROACH: ICD-10-PCS | Performed by: SPECIALIST

## 2023-03-06 PROCEDURE — 25010000002 DEXAMETHASONE PER 1 MG: Performed by: NURSE ANESTHETIST, CERTIFIED REGISTERED

## 2023-03-06 PROCEDURE — 82962 GLUCOSE BLOOD TEST: CPT

## 2023-03-06 PROCEDURE — 80048 BASIC METABOLIC PNL TOTAL CA: CPT | Performed by: SPECIALIST

## 2023-03-06 PROCEDURE — 0DN80ZZ RELEASE SMALL INTESTINE, OPEN APPROACH: ICD-10-PCS | Performed by: SPECIALIST

## 2023-03-06 PROCEDURE — 25010000002 HYDROMORPHONE PER 4 MG: Performed by: ANESTHESIOLOGY

## 2023-03-06 PROCEDURE — 0 DIATRIZOATE MEGLUMINE & SODIUM PER 1 ML: Performed by: SPECIALIST

## 2023-03-06 PROCEDURE — 85027 COMPLETE CBC AUTOMATED: CPT | Performed by: SPECIALIST

## 2023-03-06 PROCEDURE — 99024 POSTOP FOLLOW-UP VISIT: CPT | Performed by: SPECIALIST

## 2023-03-06 PROCEDURE — 86900 BLOOD TYPING SEROLOGIC ABO: CPT | Performed by: SPECIALIST

## 2023-03-06 PROCEDURE — 93005 ELECTROCARDIOGRAM TRACING: CPT | Performed by: SPECIALIST

## 2023-03-06 PROCEDURE — 0DT80ZZ RESECTION OF SMALL INTESTINE, OPEN APPROACH: ICD-10-PCS | Performed by: SPECIALIST

## 2023-03-06 PROCEDURE — 25010000002 ERTAPENEM PER 500 MG: Performed by: SPECIALIST

## 2023-03-06 PROCEDURE — 25010000002 PROPOFOL 10 MG/ML EMULSION: Performed by: NURSE ANESTHETIST, CERTIFIED REGISTERED

## 2023-03-06 PROCEDURE — 44188 LAP COLOSTOMY: CPT | Performed by: SPECIALIST

## 2023-03-06 DEVICE — STAPLER WITH DST SERIES TECHNOLOGY
Type: IMPLANTABLE DEVICE | Site: ABDOMEN | Status: FUNCTIONAL
Brand: TA

## 2023-03-06 DEVICE — PROXIMATE LINEAR CUTTER RELOAD, BLUE, 75MM
Type: IMPLANTABLE DEVICE | Site: ABDOMEN | Status: FUNCTIONAL
Brand: PROXIMATE

## 2023-03-06 DEVICE — PROXIMATE RELOADABLE LINEAR CUTTER WITH SAFETY LOCK-OUT, 75MM
Type: IMPLANTABLE DEVICE | Site: ABDOMEN | Status: FUNCTIONAL
Brand: PROXIMATE

## 2023-03-06 RX ORDER — SODIUM CHLORIDE, SODIUM LACTATE, POTASSIUM CHLORIDE, CALCIUM CHLORIDE 600; 310; 30; 20 MG/100ML; MG/100ML; MG/100ML; MG/100ML
1000 INJECTION, SOLUTION INTRAVENOUS CONTINUOUS
Status: DISCONTINUED | OUTPATIENT
Start: 2023-03-06 | End: 2023-03-06

## 2023-03-06 RX ORDER — ROCURONIUM BROMIDE 10 MG/ML
INJECTION, SOLUTION INTRAVENOUS AS NEEDED
Status: DISCONTINUED | OUTPATIENT
Start: 2023-03-06 | End: 2023-03-06 | Stop reason: SURG

## 2023-03-06 RX ORDER — BUPIVACAINE HYDROCHLORIDE AND EPINEPHRINE 2.5; 5 MG/ML; UG/ML
INJECTION, SOLUTION INFILTRATION; PERINEURAL AS NEEDED
Status: DISCONTINUED | OUTPATIENT
Start: 2023-03-06 | End: 2023-03-06 | Stop reason: HOSPADM

## 2023-03-06 RX ORDER — SODIUM CHLORIDE 9 MG/ML
40 INJECTION, SOLUTION INTRAVENOUS AS NEEDED
Status: DISCONTINUED | OUTPATIENT
Start: 2023-03-06 | End: 2023-03-06 | Stop reason: HOSPADM

## 2023-03-06 RX ORDER — FENTANYL CITRATE 50 UG/ML
INJECTION, SOLUTION INTRAMUSCULAR; INTRAVENOUS AS NEEDED
Status: DISCONTINUED | OUTPATIENT
Start: 2023-03-06 | End: 2023-03-06 | Stop reason: SURG

## 2023-03-06 RX ORDER — DROPERIDOL 2.5 MG/ML
0.62 INJECTION, SOLUTION INTRAMUSCULAR; INTRAVENOUS ONCE AS NEEDED
Status: DISCONTINUED | OUTPATIENT
Start: 2023-03-06 | End: 2023-03-06 | Stop reason: HOSPADM

## 2023-03-06 RX ORDER — OXYCODONE AND ACETAMINOPHEN 10; 325 MG/1; MG/1
1 TABLET ORAL ONCE AS NEEDED
Status: DISCONTINUED | OUTPATIENT
Start: 2023-03-06 | End: 2023-03-06 | Stop reason: HOSPADM

## 2023-03-06 RX ORDER — ENOXAPARIN SODIUM 100 MG/ML
30 INJECTION SUBCUTANEOUS DAILY
Status: DISCONTINUED | OUTPATIENT
Start: 2023-03-07 | End: 2023-03-13 | Stop reason: HOSPADM

## 2023-03-06 RX ORDER — TRAZODONE HYDROCHLORIDE 50 MG/1
50 TABLET ORAL NIGHTLY
Status: DISCONTINUED | OUTPATIENT
Start: 2023-03-06 | End: 2023-03-13 | Stop reason: HOSPADM

## 2023-03-06 RX ORDER — LIDOCAINE HYDROCHLORIDE 20 MG/ML
INJECTION, SOLUTION EPIDURAL; INFILTRATION; INTRACAUDAL; PERINEURAL AS NEEDED
Status: DISCONTINUED | OUTPATIENT
Start: 2023-03-06 | End: 2023-03-06 | Stop reason: SURG

## 2023-03-06 RX ORDER — LABETALOL HYDROCHLORIDE 5 MG/ML
5 INJECTION, SOLUTION INTRAVENOUS
Status: DISCONTINUED | OUTPATIENT
Start: 2023-03-06 | End: 2023-03-06 | Stop reason: HOSPADM

## 2023-03-06 RX ORDER — DEXAMETHASONE SODIUM PHOSPHATE 4 MG/ML
INJECTION, SOLUTION INTRA-ARTICULAR; INTRALESIONAL; INTRAMUSCULAR; INTRAVENOUS; SOFT TISSUE AS NEEDED
Status: DISCONTINUED | OUTPATIENT
Start: 2023-03-06 | End: 2023-03-06 | Stop reason: SURG

## 2023-03-06 RX ORDER — DONEPEZIL HYDROCHLORIDE 10 MG/1
10 TABLET, FILM COATED ORAL NIGHTLY
Status: DISCONTINUED | OUTPATIENT
Start: 2023-03-06 | End: 2023-03-13 | Stop reason: HOSPADM

## 2023-03-06 RX ORDER — SODIUM CHLORIDE, SODIUM LACTATE, POTASSIUM CHLORIDE, CALCIUM CHLORIDE 600; 310; 30; 20 MG/100ML; MG/100ML; MG/100ML; MG/100ML
125 INJECTION, SOLUTION INTRAVENOUS CONTINUOUS
Status: DISCONTINUED | OUTPATIENT
Start: 2023-03-06 | End: 2023-03-06

## 2023-03-06 RX ORDER — SODIUM CHLORIDE 9 MG/ML
INJECTION, SOLUTION INTRAVENOUS AS NEEDED
Status: DISCONTINUED | OUTPATIENT
Start: 2023-03-06 | End: 2023-03-06 | Stop reason: HOSPADM

## 2023-03-06 RX ORDER — ONDANSETRON 2 MG/ML
INJECTION INTRAMUSCULAR; INTRAVENOUS AS NEEDED
Status: DISCONTINUED | OUTPATIENT
Start: 2023-03-06 | End: 2023-03-06 | Stop reason: SURG

## 2023-03-06 RX ORDER — PSYLLIUM HUSK 0.4 G
1 CAPSULE ORAL DAILY
COMMUNITY

## 2023-03-06 RX ORDER — POLYETHYLENE GLYCOL 3350 17 G/17G
17 POWDER, FOR SOLUTION ORAL DAILY
COMMUNITY

## 2023-03-06 RX ORDER — SUCCINYLCHOLINE/SOD CL,ISO/PF 200MG/10ML
SYRINGE (ML) INTRAVENOUS AS NEEDED
Status: DISCONTINUED | OUTPATIENT
Start: 2023-03-06 | End: 2023-03-06 | Stop reason: SURG

## 2023-03-06 RX ORDER — SODIUM CHLORIDE, SODIUM LACTATE, POTASSIUM CHLORIDE, CALCIUM CHLORIDE 600; 310; 30; 20 MG/100ML; MG/100ML; MG/100ML; MG/100ML
100 INJECTION, SOLUTION INTRAVENOUS CONTINUOUS
Status: DISCONTINUED | OUTPATIENT
Start: 2023-03-06 | End: 2023-03-06

## 2023-03-06 RX ORDER — PROPOFOL 10 MG/ML
VIAL (ML) INTRAVENOUS AS NEEDED
Status: DISCONTINUED | OUTPATIENT
Start: 2023-03-06 | End: 2023-03-06 | Stop reason: SURG

## 2023-03-06 RX ORDER — FLUMAZENIL 0.1 MG/ML
0.2 INJECTION INTRAVENOUS AS NEEDED
Status: DISCONTINUED | OUTPATIENT
Start: 2023-03-06 | End: 2023-03-06 | Stop reason: HOSPADM

## 2023-03-06 RX ORDER — LIDOCAINE HYDROCHLORIDE 10 MG/ML
0.5 INJECTION, SOLUTION EPIDURAL; INFILTRATION; INTRACAUDAL; PERINEURAL ONCE AS NEEDED
Status: DISCONTINUED | OUTPATIENT
Start: 2023-03-06 | End: 2023-03-06 | Stop reason: HOSPADM

## 2023-03-06 RX ORDER — ONDANSETRON 2 MG/ML
4 INJECTION INTRAMUSCULAR; INTRAVENOUS
Status: DISCONTINUED | OUTPATIENT
Start: 2023-03-06 | End: 2023-03-06 | Stop reason: HOSPADM

## 2023-03-06 RX ORDER — FAMOTIDINE 10 MG/ML
20 INJECTION, SOLUTION INTRAVENOUS 2 TIMES DAILY
Status: DISCONTINUED | OUTPATIENT
Start: 2023-03-06 | End: 2023-03-07

## 2023-03-06 RX ORDER — PREGABALIN 100 MG/1
100 CAPSULE ORAL 3 TIMES DAILY
Status: DISCONTINUED | OUTPATIENT
Start: 2023-03-06 | End: 2023-03-13 | Stop reason: HOSPADM

## 2023-03-06 RX ORDER — ESTRADIOL 0.1 MG/G
0.5 CREAM VAGINAL 2 TIMES WEEKLY
Status: ON HOLD | COMMUNITY
End: 2023-03-06

## 2023-03-06 RX ORDER — SODIUM CHLORIDE 0.9 % (FLUSH) 0.9 %
3 SYRINGE (ML) INJECTION EVERY 12 HOURS SCHEDULED
Status: DISCONTINUED | OUTPATIENT
Start: 2023-03-06 | End: 2023-03-06 | Stop reason: HOSPADM

## 2023-03-06 RX ORDER — HYDROMORPHONE HYDROCHLORIDE 1 MG/ML
0.5 INJECTION, SOLUTION INTRAMUSCULAR; INTRAVENOUS; SUBCUTANEOUS
Status: DISCONTINUED | OUTPATIENT
Start: 2023-03-06 | End: 2023-03-06 | Stop reason: HOSPADM

## 2023-03-06 RX ORDER — FENTANYL CITRATE 50 UG/ML
25 INJECTION, SOLUTION INTRAMUSCULAR; INTRAVENOUS
Status: DISCONTINUED | OUTPATIENT
Start: 2023-03-06 | End: 2023-03-06 | Stop reason: HOSPADM

## 2023-03-06 RX ORDER — SODIUM CHLORIDE 0.9 % (FLUSH) 0.9 %
10 SYRINGE (ML) INJECTION AS NEEDED
Status: DISCONTINUED | OUTPATIENT
Start: 2023-03-06 | End: 2023-03-06 | Stop reason: HOSPADM

## 2023-03-06 RX ORDER — ASCORBIC ACID 500 MG
500 TABLET ORAL DAILY
COMMUNITY

## 2023-03-06 RX ORDER — SODIUM CHLORIDE 0.9 % (FLUSH) 0.9 %
3-10 SYRINGE (ML) INJECTION AS NEEDED
Status: DISCONTINUED | OUTPATIENT
Start: 2023-03-06 | End: 2023-03-06 | Stop reason: HOSPADM

## 2023-03-06 RX ORDER — NALOXONE HCL 0.4 MG/ML
0.04 VIAL (ML) INJECTION AS NEEDED
Status: DISCONTINUED | OUTPATIENT
Start: 2023-03-06 | End: 2023-03-06 | Stop reason: HOSPADM

## 2023-03-06 RX ORDER — METOPROLOL TARTRATE 5 MG/5ML
5 INJECTION INTRAVENOUS EVERY 6 HOURS
Status: DISCONTINUED | OUTPATIENT
Start: 2023-03-06 | End: 2023-03-10

## 2023-03-06 RX ORDER — ESTRADIOL 10 UG/1
1 INSERT VAGINAL 2 TIMES WEEKLY
COMMUNITY

## 2023-03-06 RX ADMIN — SUGAMMADEX 200 MG: 100 INJECTION, SOLUTION INTRAVENOUS at 15:42

## 2023-03-06 RX ADMIN — TRAZODONE HYDROCHLORIDE 50 MG: 50 TABLET ORAL at 20:14

## 2023-03-06 RX ADMIN — SODIUM CHLORIDE, POTASSIUM CHLORIDE, SODIUM LACTATE AND CALCIUM CHLORIDE 100 ML/HR: 600; 310; 30; 20 INJECTION, SOLUTION INTRAVENOUS at 13:37

## 2023-03-06 RX ADMIN — SODIUM CHLORIDE, POTASSIUM CHLORIDE, SODIUM LACTATE AND CALCIUM CHLORIDE 125 ML/HR: 600; 310; 30; 20 INJECTION, SOLUTION INTRAVENOUS at 00:09

## 2023-03-06 RX ADMIN — DIATRIZOATE MEGLUMINE AND DIATRIZOATE SODIUM 360 ML: 660; 100 LIQUID ORAL; RECTAL at 09:30

## 2023-03-06 RX ADMIN — FAMOTIDINE 20 MG: 10 INJECTION INTRAVENOUS at 20:09

## 2023-03-06 RX ADMIN — PREGABALIN 100 MG: 100 CAPSULE ORAL at 20:13

## 2023-03-06 RX ADMIN — Medication 140 MG: at 14:10

## 2023-03-06 RX ADMIN — METOPROLOL TARTRATE 5 MG: 5 INJECTION INTRAVENOUS at 20:10

## 2023-03-06 RX ADMIN — FENTANYL CITRATE 100 MCG: 50 INJECTION INTRAMUSCULAR; INTRAVENOUS at 14:10

## 2023-03-06 RX ADMIN — FENTANYL CITRATE 50 MCG: 50 INJECTION INTRAMUSCULAR; INTRAVENOUS at 14:57

## 2023-03-06 RX ADMIN — PROPOFOL INJECTABLE EMULSION 110 MG: 10 INJECTION, EMULSION INTRAVENOUS at 14:10

## 2023-03-06 RX ADMIN — FENTANYL CITRATE 50 MCG: 50 INJECTION INTRAMUSCULAR; INTRAVENOUS at 15:04

## 2023-03-06 RX ADMIN — SODIUM CHLORIDE, POTASSIUM CHLORIDE, SODIUM LACTATE AND CALCIUM CHLORIDE 1000 ML: 600; 310; 30; 20 INJECTION, SOLUTION INTRAVENOUS at 13:37

## 2023-03-06 RX ADMIN — SODIUM CHLORIDE 1 G: 9 INJECTION, SOLUTION INTRAVENOUS at 14:16

## 2023-03-06 RX ADMIN — DONEPEZIL HYDROCHLORIDE 10 MG: 10 TABLET ORAL at 20:14

## 2023-03-06 RX ADMIN — FAMOTIDINE 20 MG: 10 INJECTION INTRAVENOUS at 08:50

## 2023-03-06 RX ADMIN — MORPHINE SULFATE 4 MG: 4 INJECTION, SOLUTION INTRAMUSCULAR; INTRAVENOUS at 00:43

## 2023-03-06 RX ADMIN — ROCURONIUM BROMIDE 5 MG: 10 SOLUTION INTRAVENOUS at 14:10

## 2023-03-06 RX ADMIN — LIDOCAINE HYDROCHLORIDE 100 MG: 20 INJECTION, SOLUTION EPIDURAL; INFILTRATION; INTRACAUDAL; PERINEURAL at 14:10

## 2023-03-06 RX ADMIN — ROCURONIUM BROMIDE 20 MG: 10 SOLUTION INTRAVENOUS at 14:34

## 2023-03-06 RX ADMIN — SODIUM CHLORIDE, POTASSIUM CHLORIDE, SODIUM LACTATE AND CALCIUM CHLORIDE 125 ML/HR: 600; 310; 30; 20 INJECTION, SOLUTION INTRAVENOUS at 08:56

## 2023-03-06 RX ADMIN — ROCURONIUM BROMIDE 30 MG: 10 SOLUTION INTRAVENOUS at 14:19

## 2023-03-06 RX ADMIN — FENTANYL CITRATE 25 MCG: 50 INJECTION INTRAMUSCULAR; INTRAVENOUS at 16:09

## 2023-03-06 RX ADMIN — MORPHINE SULFATE 4 MG: 4 INJECTION, SOLUTION INTRAMUSCULAR; INTRAVENOUS at 05:06

## 2023-03-06 RX ADMIN — DEXAMETHASONE SODIUM PHOSPHATE 4 MG: 4 INJECTION, SOLUTION INTRA-ARTICULAR; INTRALESIONAL; INTRAMUSCULAR; INTRAVENOUS; SOFT TISSUE at 15:42

## 2023-03-06 RX ADMIN — HYDROMORPHONE HYDROCHLORIDE 0.5 MG: 1 INJECTION, SOLUTION INTRAMUSCULAR; INTRAVENOUS; SUBCUTANEOUS at 16:08

## 2023-03-06 RX ADMIN — ONDANSETRON 4 MG: 2 INJECTION INTRAMUSCULAR; INTRAVENOUS at 10:47

## 2023-03-06 RX ADMIN — MORPHINE SULFATE 4 MG: 4 INJECTION, SOLUTION INTRAMUSCULAR; INTRAVENOUS at 11:18

## 2023-03-06 RX ADMIN — SODIUM CHLORIDE, POTASSIUM CHLORIDE, SODIUM LACTATE AND CALCIUM CHLORIDE 125 ML/HR: 600; 310; 30; 20 INJECTION, SOLUTION INTRAVENOUS at 23:17

## 2023-03-06 RX ADMIN — ONDANSETRON 4 MG: 2 INJECTION INTRAMUSCULAR; INTRAVENOUS at 15:42

## 2023-03-06 RX ADMIN — MORPHINE SULFATE 4 MG: 4 INJECTION, SOLUTION INTRAMUSCULAR; INTRAVENOUS at 23:23

## 2023-03-06 RX ADMIN — METOPROLOL TARTRATE 5 MG: 5 INJECTION INTRAVENOUS at 08:50

## 2023-03-06 RX ADMIN — ONDANSETRON 4 MG: 2 INJECTION INTRAMUSCULAR; INTRAVENOUS at 05:15

## 2023-03-06 RX ADMIN — CEFOXITIN 1 G: 1 INJECTION, POWDER, FOR SOLUTION INTRAVENOUS at 17:23

## 2023-03-06 NOTE — ED PROVIDER NOTES
"EMERGENCY DEPARTMENT ATTENDING NOTE    Patient Name: Hannah Dasilva    Chief Complaint   Patient presents with   • Abdominal Pain       PATIENT PRESENTATION:  Hannah Dasilva is a very pleasant 89 y.o. female with a prior history of cholecystectomy as well as a history of chronic constipation on daily MiraLAX who presents emergency department due to acute abdominal pain.    History was provided by the patient as well as corroborated by her granddaughter and son at the bedside.  Patient started having sudden onset moderate to severe right lower quadrant abdominal pain at about 1400.  States that she was feeling fine previously.  Per her report she has not had a \"firm solid poop \"in about 6 weeks.  Takes MiraLAX every day.  Has had some watery diarrhea yesterday.  Denies any vomiting episodes.  Denies any nausea.  No fevers or chills.      PHYSICAL EXAM:   VS: /81 (BP Location: Right arm, Patient Position: Lying)   Pulse 67   Temp 98.2 °F (36.8 °C) (Oral)   Resp 18   Ht 172.7 cm (68\")   Wt 72.6 kg (160 lb)   SpO2 91%   BMI 24.33 kg/m²   GENERAL: Significantly comfortable appearing elderly woman sitting up in bed.  Well-nourished, well-developed, awake, alert, no acute distress, nontoxic appearing  RESPIRATORY: unlabored respiratory effort, clear to auscultation bilaterally, good air movement  CARDIOVASCULAR: no murmurs, peripheral pulses 2+ and equal in all extremities  GI: soft, slight distention with moderate right lower quadrant tenderness, no obvious peritoneal signs  SKIN: warm and dry with no obvious rashes  PSYCHIATRIC: alert, pleasant and cooperative. Appropriate mood and affect.      MEDICAL DECISION MAKING:    Hannah Dasilva is a 89 y.o. female with a history of constipation presents emergency room with acute right lower quadrant abdominal pain.    Differential Diagnosis Considered: Appendicitis, bowel obstruction, urinary tract infection, colitis, diverticulitis, intra-abdominal " abscess    Labs Ordered:  Labs Reviewed   COMPREHENSIVE METABOLIC PANEL - Abnormal; Notable for the following components:       Result Value    Glucose 170 (*)     Sodium 133 (*)     Chloride 95 (*)     Calcium 9.7 (*)     BUN/Creatinine Ratio 28.0 (*)     All other components within normal limits    Narrative:     GFR Normal >60                  Chronic Kidney Disease <60                  Kidney Failure <15                                    The GFR formula is only valid for adults with stable renal function between ages 18 and 70.   URINALYSIS W/ CULTURE IF INDICATED - Abnormal; Notable for the following components:    Appearance, UA Cloudy (*)     pH, UA 8.5 (*)     All other components within normal limits    Narrative:     In absence of clinical symptoms, the presence of pyuria, bacteria, and/or nitrites on the urinalysis result does not correlate with infection.                  Urine microscopic not indicated.   CBC WITH AUTO DIFFERENTIAL - Abnormal; Notable for the following components:    WBC 14.52 (*)     Neutrophil % 83.7 (*)     Lymphocyte % 8.8 (*)     Neutrophils, Absolute 12.15 (*)     All other components within normal limits   LIPASE - Normal   LACTIC ACID, PLASMA - Normal   CBC AND DIFFERENTIAL    Narrative:     The following orders were created for panel order CBC & Differential.                  Procedure                               Abnormality         Status                                     ---------                               -----------         ------                                     CBC Auto Differential[[id number]]        Abnormal            Final result                                                 Please view results for these tests on the individual orders.        Imaging Ordered:   CT Abdomen Pelvis With Contrast   Final Result       1.  Dilated proximal to mid small bowel loops containing feculent   material with abrupt transition point in the RIGHT lower quadrant.    Findings are in keeping with a small bowel obstruction. Small amount of   interloop fluid is noted amongst the dilated loops.       2.  Hepatic steatosis.       3.  Sigmoid diverticulosis without diverticulitis.   This report was finalized on 03/05/2023 21:18 by Dr. Toney Morales MD.      XR Abdomen KUB    (Results Pending)       Internal chart review:  Past Medical History:   Diagnosis Date   • Diabetes mellitus (HCC)    • Disc degeneration, lumbar    • Diverticulosis    • Family history of colonic polyps    • History of adenomatous polyp of colon    • Hypercholesterolemia    • Irregular heartbeat    • LAFB (left anterior fascicular block) 10/26/2018   • MVP (mitral valve prolapse)    • Osteoarthritis    • Sciatica        Past Surgical History:   Procedure Laterality Date   • APPENDECTOMY     • BLADDER REPAIR     • CHOLECYSTECTOMY WITH INTRAOPERATIVE CHOLANGIOGRAM N/A 3/31/2020    Procedure: CHOLECYSTECTOMY LAPAROSCOPIC INTRAOPERATIVE CHOLANGIOGRAM;  Surgeon: Jenna Kinney MD;  Location: [facility];  Service: General;  Laterality: N/A;   • COLONOSCOPY  02/23/2010    diverticulosis   • COLONOSCOPY W/ POLYPECTOMY  11/06/2006    ASCENDING COLON ADENOMATOUS POLYP   • ENDOSCOPY  10/04/2006    SBBX UNREMARKABLE, SMALL HH, UREA NEG   • HYSTERECTOMY     • TONSILLECTOMY         Allergies   Allergen Reactions   • Penicillins Nausea And Vomiting   • Quinine Derivatives Nausea And Vomiting   • Tizanidine Other (See Comments)     Weakness, fever   • Sulfa Antibiotics Rash         Current Facility-Administered Medications:   •  lactated ringers infusion, 125 mL/hr, Intravenous, Continuous, Brittaney Rios MD  •  morphine injection 4 mg, 4 mg, Intravenous, Q4H PRN, Brittaney Rios MD  •  ondansetron (ZOFRAN) injection 4 mg, 4 mg, Intravenous, Q4H PRN, Brittaney Rios MD  •  [COMPLETED] Insert Peripheral IV, , , Once **AND** sodium chloride 0.9 % flush 10 mL, 10 mL, Intravenous, PRN, Azeem Hanley MD    My lab  interpretation: Urinalysis with no evidence of infection.  Elevated white blood count of 14.52.  Normal hemoglobin.  Normal lactate.  Normal lipase.  CMP relatively unremarkable just slightly decreased sodium and chloride.    My imaging interpretation: CT abdomen pelvis with dilated proximal mid small bowel loops containing feculent material with abrupt transition point right lower quadrant consistent with small bowel obstruction.    Discussed with: the on-call general surgeon who agreed with plan for NG tube placement and admission to her service for further management., Dr. Rios,     ED Course and Re-evaluation: 89yo F with severe right lower quadrant Fernando pain acute knee injury.  Given history of constipation suspect possible constipation but higher suspicion for appendicitis or bowel obstruction.  We ordered CT imaging and labs.  Her labs are relatively unremarkable except for slight leukocytosis.  Her vital signs otherwise reassuring.  Patient is dosed with IV Zofran IV morphine IV Dilaudid.  CT with clear evidence of small bowel obstruction consistent with patient's presentation.  Discussed with general surgeon who agreed to plan for NG tube placement which was placed and patient was admitted to general surgery service for further management.  No evidence of appendicitis no evidence of urinary tract infection.  No evidence of bowel perforation.  Normal lactate lower suspicion for ischemic bowel disease at this time.      ED Diagnosis:  Abdominal pain, acute, right lower quadrant; Decreased stooling; History of cholecystectomy; Small bowel obstruction (HCC)    Disposition: admit to general surgery        Signed:  Azeem Hanley MD  Emergency Medicine Physician    Please note that portions of this note were completed with a voice recognition program.      Azeem Hanley MD  03/06/23 0002

## 2023-03-06 NOTE — PLAN OF CARE
Goal Outcome Evaluation:      Pt returned from surgery after surgical procedure: Diagnostic laparoscopy, laparoscopic lysis of adhesions, exploratory laparotomy, lysis of adhesions, small bowel resection. Midline incision with 2 stab wounds noted to abdomen. Small amount of drainage present to midline. Pt denies pain/nausea. NG to low intermittent suction. F/C to BSD. V/S WNL. Family at bedside. Will continue to monitor pt.

## 2023-03-06 NOTE — ED NOTES
Informed pt an I&O cath has been ordered. Informed her it is the cleanest way to obtain urine. Pt urinated a few minutes ago and had both stool and urine in hat. Educated her on why that sample could not be used. Pt wants to attempt to urinate on her own again.

## 2023-03-06 NOTE — ED NOTES
Pt assisted to bedside commode a second time. She was able to urinate again. Pt states she has not urinated much today and this is the most she has done all day. Pt urinated in floor and on her clothing. Pt given mesh panties, a pad, and some no slip socks.

## 2023-03-06 NOTE — PLAN OF CARE
Goal Outcome Evaluation:  Plan of Care Reviewed With: patient        Progress: no change  Outcome Evaluation: Medicated x1 for c/o pain, denies nausea, NG to LIWS, voiding, up w assist x2, tele NS, abd soft distended, rm air, son at bedside, safety maintained.

## 2023-03-06 NOTE — ANESTHESIA PROCEDURE NOTES
Airway  Urgency: elective    Date/Time: 3/6/2023 2:12 PM  Airway not difficult    General Information and Staff    Patient location during procedure: OR  CRNA/CAA: Ginny Lopez CRNA    Indications and Patient Condition  Indications for airway management: airway protection    Preoxygenated: yes  Mask difficulty assessment: 0 - not attempted    Final Airway Details  Final airway type: endotracheal airway      Successful airway: ETT  Cuffed: yes   Successful intubation technique: direct laryngoscopy and RSI  Facilitating devices/methods: intubating stylet and cricoid pressure  Endotracheal tube insertion site: oral  Blade: Marvin  Blade size: 3.5  ETT size (mm): 7.5  Cormack-Lehane Classification: grade I - full view of glottis  Placement verified by: chest auscultation and capnometry   Cuff volume (mL): 8  Measured from: lips  ETT/EBT  to lips (cm): 22  Number of attempts at approach: 1  Assessment: lips, teeth, and gum same as pre-op and atraumatic intubation

## 2023-03-06 NOTE — PAYOR COMM NOTE
"Hannah Harris (89 y.o. Female)  [id number]   Admit 3/5  [facility]  xavi phone   Fax         Date of Birth   11/27/1929    Social Security Number       Address   [address] [address]    Home Phone   [phone]    MRN   [id number]       [employer]ism   Pres[employer]    Marital Status                               Admission Date   3/5/23    Admission Type   Emergency    Admitting Provider   Brittaney Rios MD    Attending Provider   Brittaney Rios MD    Department, Room/Bed   [facility] 3C, 375/1       Discharge Date       Discharge Disposition       Discharge Destination                               Attending Provider: Brittaney Rios MD    Allergies: Penicillins, Quinine Derivatives, Tizanidine, Sulfa Antibiotics    Isolation: None   Infection: C.difficile (03/07/17)   Code Status: CPR    Ht: 172.7 cm (68\")   Wt: 72.6 kg (160 lb)    Admission Cmt: None   Principal Problem: Small bowel obstruction (HCC) [K56.609]                 Active Insurance as of 3/5/2023     Primary Coverage     Payor Plan Insurance Group Employer/Plan Group    [facility] MEDICARE REPLACEMENT [facility] MEDICARE REPLACEMENT [id number]     Payor Plan Address Payor Plan Phone Number Payor Plan Fax Number Effective Dates    [address] [address]   1/1/2017 - None Entered    [facility] [id number]       Subscriber Name Subscriber Birth Date Member ID       HANNAH HARRIS 11/27/1929 [id number]                 Emergency Contacts      (Rel.) Home Phone Work Phone Mobile Phone    [employer] (Relative) [phone] -- --    Ham Harris (Son) -- [phone]    Gal Harris (Son) [phone]    hailee harris (Son) -- -- [phone]               Emergency Department Notes      Azeem Hanley MD at 03/05/23 1950          EMERGENCY DEPARTMENT ATTENDING NOTE    Patient Name: Hannah Harris    Chief " "Complaint   Patient presents with   • Abdominal Pain       PATIENT PRESENTATION:  Hannah Dasilva is a very pleasant 89 y.o. female with a prior history of cholecystectomy as well as a history of chronic constipation on daily MiraLAX who presents emergency department due to acute abdominal pain.    History was provided by the patient as well as corroborated by her granddaughter and son at the bedside.  Patient started having sudden onset moderate to severe right lower quadrant abdominal pain at about 1400.  States that she was feeling fine previously.  Per her report she has not had a \"firm solid poop \"in about 6 weeks.  Takes MiraLAX every day.  Has had some watery diarrhea yesterday.  Denies any vomiting episodes.  Denies any nausea.  No fevers or chills.      PHYSICAL EXAM:   VS: /81 (BP Location: Right arm, Patient Position: Lying)   Pulse 67   Temp 98.2 °F (36.8 °C) (Oral)   Resp 18   Ht 172.7 cm (68\")   Wt 72.6 kg (160 lb)   SpO2 91%   BMI 24.33 kg/m²   GENERAL: Significantly comfortable appearing elderly woman sitting up in bed.  Well-nourished, well-developed, awake, alert, no acute distress, nontoxic appearing  RESPIRATORY: unlabored respiratory effort, clear to auscultation bilaterally, good air movement  CARDIOVASCULAR: no murmurs, peripheral pulses 2+ and equal in all extremities  GI: soft, slight distention with moderate right lower quadrant tenderness, no obvious peritoneal signs  SKIN: warm and dry with no obvious rashes  PSYCHIATRIC: alert, pleasant and cooperative. Appropriate mood and affect.      MEDICAL DECISION MAKING:    Hannah Dasilva is a 89 y.o. female with a history of constipation presents emergency room with acute right lower quadrant abdominal pain.    Differential Diagnosis Considered: Appendicitis, bowel obstruction, urinary tract infection, colitis, diverticulitis, intra-abdominal abscess    Labs Ordered:  Labs Reviewed   COMPREHENSIVE METABOLIC PANEL - Abnormal; " Notable for the following components:       Result Value    Glucose 170 (*)     Sodium 133 (*)     Chloride 95 (*)     Calcium 9.7 (*)     BUN/Creatinine Ratio 28.0 (*)     All other components within normal limits    Narrative:     GFR Normal >60                  Chronic Kidney Disease <60                  Kidney Failure <15                                    The GFR formula is only valid for adults with stable renal function between ages 18 and 70.   URINALYSIS W/ CULTURE IF INDICATED - Abnormal; Notable for the following components:    Appearance, UA Cloudy (*)     pH, UA 8.5 (*)     All other components within normal limits    Narrative:     In absence of clinical symptoms, the presence of pyuria, bacteria, and/or nitrites on the urinalysis result does not correlate with infection.                  Urine microscopic not indicated.   CBC WITH AUTO DIFFERENTIAL - Abnormal; Notable for the following components:    WBC 14.52 (*)     Neutrophil % 83.7 (*)     Lymphocyte % 8.8 (*)     Neutrophils, Absolute 12.15 (*)     All other components within normal limits   LIPASE - Normal   LACTIC ACID, PLASMA - Normal   CBC AND DIFFERENTIAL    Narrative:     The following orders were created for panel order CBC & Differential.                  Procedure                               Abnormality         Status                                     ---------                               -----------         ------                                     CBC Auto Differential[[id number]]        Abnormal            Final result                                                 Please view results for these tests on the individual orders.        Imaging Ordered:   CT Abdomen Pelvis With Contrast   Final Result       1.  Dilated proximal to mid small bowel loops containing feculent   material with abrupt transition point in the RIGHT lower quadrant.   Findings are in keeping with a small bowel obstruction. Small amount of   interloop fluid  is noted amongst the dilated loops.       2.  Hepatic steatosis.       3.  Sigmoid diverticulosis without diverticulitis.   This report was finalized on 03/05/2023 21:18 by Dr. Toney Morales MD.      XR Abdomen KUB    (Results Pending)       Internal chart review:  Past Medical History:   Diagnosis Date   • Diabetes mellitus (HCC)    • Disc degeneration, lumbar    • Diverticulosis    • Family history of colonic polyps    • History of adenomatous polyp of colon    • Hypercholesterolemia    • Irregular heartbeat    • LAFB (left anterior fascicular block) 10/26/2018   • MVP (mitral valve prolapse)    • Osteoarthritis    • Sciatica        Past Surgical History:   Procedure Laterality Date   • APPENDECTOMY     • BLADDER REPAIR     • CHOLECYSTECTOMY WITH INTRAOPERATIVE CHOLANGIOGRAM N/A 3/31/2020    Procedure: CHOLECYSTECTOMY LAPAROSCOPIC INTRAOPERATIVE CHOLANGIOGRAM;  Surgeon: Jenna Kinney MD;  Location: [facility];  Service: General;  Laterality: N/A;   • COLONOSCOPY  02/23/2010    diverticulosis   • COLONOSCOPY W/ POLYPECTOMY  11/06/2006    ASCENDING COLON ADENOMATOUS POLYP   • ENDOSCOPY  10/04/2006    SBBX UNREMARKABLE, SMALL HH, UREA NEG   • HYSTERECTOMY     • TONSILLECTOMY         Allergies   Allergen Reactions   • Penicillins Nausea And Vomiting   • Quinine Derivatives Nausea And Vomiting   • Tizanidine Other (See Comments)     Weakness, fever   • Sulfa Antibiotics Rash         Current Facility-Administered Medications:   •  lactated ringers infusion, 125 mL/hr, Intravenous, Continuous, Brittaney Rios MD  •  morphine injection 4 mg, 4 mg, Intravenous, Q4H PRN, Brittaney Rios MD  •  ondansetron (ZOFRAN) injection 4 mg, 4 mg, Intravenous, Q4H PRN, Brittaney Rios MD  •  [COMPLETED] Insert Peripheral IV, , , Once **AND** sodium chloride 0.9 % flush 10 mL, 10 mL, Intravenous, PRN, Azeem Hanley MD    My lab interpretation: Urinalysis with no evidence of infection.  Elevated white blood count of 14.52.   Normal hemoglobin.  Normal lactate.  Normal lipase.  CMP relatively unremarkable just slightly decreased sodium and chloride.    My imaging interpretation: CT abdomen pelvis with dilated proximal mid small bowel loops containing feculent material with abrupt transition point right lower quadrant consistent with small bowel obstruction.    Discussed with: the on-call general surgeon who agreed with plan for NG tube placement and admission to her service for further management., Dr. Rios,     ED Course and Re-evaluation: 89yo F with severe right lower quadrant Fernando pain acute knee injury.  Given history of constipation suspect possible constipation but higher suspicion for appendicitis or bowel obstruction.  We ordered CT imaging and labs.  Her labs are relatively unremarkable except for slight leukocytosis.  Her vital signs otherwise reassuring.  Patient is dosed with IV Zofran IV morphine IV Dilaudid.  CT with clear evidence of small bowel obstruction consistent with patient's presentation.  Discussed with general surgeon who agreed to plan for NG tube placement which was placed and patient was admitted to general surgery service for further management.  No evidence of appendicitis no evidence of urinary tract infection.  No evidence of bowel perforation.  Normal lactate lower suspicion for ischemic bowel disease at this time.      ED Diagnosis:  Abdominal pain, acute, right lower quadrant; Decreased stooling; History of cholecystectomy; Small bowel obstruction (HCC)    Disposition: admit to general surgery        Signed:  Azeem Hanley MD  Emergency Medicine Physician    Please note that portions of this note were completed with a voice recognition program.      Azeem Hanley MD  03/06/23 0002      Electronically signed by Azeem Hanley MD at 03/06/23 0002     Sandra Wray, RN at 03/05/23 1958        Informed pt an I&O cath has been ordered. Informed her it is the cleanest way to obtain  urine. Pt urinated a few minutes ago and had both stool and urine in hat. Educated her on why that sample could not be used. Pt wants to attempt to urinate on her own again.     Electronically signed by Sandra Wray RN at 03/05/23 2012     Sandra Wray RN at 03/05/23 2055        Pt assisted to bedside commode a second time. She was able to urinate again. Pt states she has not urinated much today and this is the most she has done all day. Pt urinated in floor and on her clothing. Pt given mesh panties, a pad, and some no slip socks.     Electronically signed by Sandra Wray RN at 03/05/23 2123     Sandra Wray RN at 03/05/23 2106        Pt in CT. Family asked for belongings bag for dirty clothes. Bag given. Then asked for a belongings bag for clean clothes. Additional bag given.    Electronically signed by Sandra Wray RN at 03/05/23 2122     Sandra Wray RN at 03/05/23 2241          Nursing report ED to floor  Hannah Francisbrandon  89 y.o.  female    HPI:   Chief Complaint   Patient presents with   • Abdominal Pain       Admitting doctor:   Brittaney Rios MD    Consulting provider(s):  Consults       No orders found from 2/4/2023 to 3/6/2023.             Admitting diagnosis:   The primary encounter diagnosis was Small bowel obstruction (HCC). Diagnoses of Abdominal pain, acute, right lower quadrant, Decreased stooling, and History of cholecystectomy were also pertinent to this visit.    Code status:   Current Code Status       Date Active Code Status Order ID Comments User Context       Prior            Allergies:   Penicillins, Quinine derivatives, Tizanidine, and Sulfa antibiotics    Intake and Output    Intake/Output Summary (Last 24 hours) at 3/5/2023 2241  Last data filed at 3/5/2023 2055  Gross per 24 hour   Intake 500 ml   Output --   Net 500 ml       Weight:       03/05/23 1915   Weight: 72.6 kg (160 lb)       Most recent vitals:   Vitals:    03/05/23 1912 03/05/23 1915 03/05/23  "2224   BP: 173/94  170/97   BP Location: Right arm     Patient Position: Lying     Pulse: 72  67   Resp: 23  18   Temp: 98 °F (36.7 °C)     TempSrc: Oral     SpO2: 100%  97%   Weight:  72.6 kg (160 lb)    Height: 172.7 cm (68\")       Oxygen Therapy: .    Active LDAs/IV Access:   Lines, Drains & Airways       Active LDAs       Name Placement date Placement time Site Days    Peripheral IV 03/05/23 1916 Left Antecubital 03/05/23 1916  Antecubital  less than 1    NG/OG Tube Nasogastric 18 Fr Right nostril 03/05/23 2220  Right nostril  less than 1                    Labs (abnormal labs have a star):   Labs Reviewed   COMPREHENSIVE METABOLIC PANEL - Abnormal; Notable for the following components:       Result Value    Glucose 170 (*)     Sodium 133 (*)     Chloride 95 (*)     Calcium 9.7 (*)     BUN/Creatinine Ratio 28.0 (*)     All other components within normal limits    Narrative:     GFR Normal >60  Chronic Kidney Disease <60  Kidney Failure <15    The GFR formula is only valid for adults with stable renal function between ages 18 and 70.   URINALYSIS W/ CULTURE IF INDICATED - Abnormal; Notable for the following components:    Appearance, UA Cloudy (*)     pH, UA 8.5 (*)     All other components within normal limits    Narrative:     In absence of clinical symptoms, the presence of pyuria, bacteria, and/or nitrites on the urinalysis result does not correlate with infection.  Urine microscopic not indicated.   CBC WITH AUTO DIFFERENTIAL - Abnormal; Notable for the following components:    WBC 14.52 (*)     Neutrophil % 83.7 (*)     Lymphocyte % 8.8 (*)     Neutrophils, Absolute 12.15 (*)     All other components within normal limits   LIPASE - Normal   LACTIC ACID, PLASMA - Normal   CBC AND DIFFERENTIAL    Narrative:     The following orders were created for panel order CBC & Differential.  Procedure                               Abnormality         Status                     ---------                               " -----------         ------                     CBC Auto Differential[[id number]]        Abnormal            Final result                 Please view results for these tests on the individual orders.       Meds given in ED:   Medications   sodium chloride 0.9 % flush 10 mL (has no administration in time range)   lactated ringers infusion (has no administration in time range)   benzocaine (HURRICAINE) 20 % liquid solution 2 spray (has no administration in time range)   ondansetron (ZOFRAN) injection 4 mg (4 mg Intravenous Given 3/5/23 1955)   morphine injection 4 mg (4 mg Intravenous Given 3/5/23 1953)   lactated ringers bolus 500 mL (0 mL Intravenous Stopped 3/5/23 2055)   iopamidol (ISOVUE-300) 61 % injection 100 mL (100 mL Intravenous Given 3/5/23 2106)   HYDROmorphone (DILAUDID) injection 0.5 mg (0.5 mg Intravenous Given 3/5/23 2134)     lactated ringers, 50 mL/hr         NIH Stroke Scale:       Isolation/Infection(s):  No active isolations   C.difficile     COVID Testing  Collected .  Resulted .    Nursing report ED to floor:  Mental status: .  Ambulatory status: .  Precautions: .    ED nurse phone extentsion- ..      Electronically signed by Sandra Wray RN at 03/05/23 2246         Current Facility-Administered Medications   Medication Dose Route Frequency Provider Last Rate Last Admin   • donepezil (ARICEPT) tablet 10 mg  10 mg Oral Nightly Brittaney Rios MD       • [START ON 3/7/2023] Enoxaparin Sodium (LOVENOX) syringe 30 mg  30 mg Subcutaneous Daily Brittaney Rios MD       • famotidine (PEPCID) injection 20 mg  20 mg Intravenous BID Brittaney Rios MD   20 mg at 03/06/23 0850   • lactated ringers infusion  125 mL/hr Intravenous Continuous Brittaney Rios  mL/hr at 03/06/23 0856 125 mL/hr at 03/06/23 0856   • metoprolol tartrate (LOPRESSOR) injection 5 mg  5 mg Intravenous Q6H Brittaney Rios MD   5 mg at 03/06/23 0850   • morphine injection 4 mg  4 mg Intravenous Q4H PRN Brittaney Rios  MD CELIO   4 mg at 03/06/23 0506   • ondansetron (ZOFRAN) injection 4 mg  4 mg Intravenous Q4H PRN Brittaney Rios MD   4 mg at 03/06/23 0515   • pregabalin (LYRICA) capsule 100 mg  100 mg Oral TID Brittaney Rios MD       • sodium chloride 0.9 % flush 10 mL  10 mL Intravenous PRN Azeem Hanley MD       • traZODone (DESYREL) tablet 50 mg  50 mg Oral Nightly Brittaney Rios MD           Imaging Results (Last 24 Hours)     Procedure Component Value Units Date/Time    XR Abdomen KUB [[id number]] Collected: 03/06/23 0602     Updated: 03/06/23 0608    Narrative:      EXAMINATION: XR ABDOMEN KUB-     3/5/2023 10:30 PM CST     HISTORY: nasogastric tube placed, confirm position; K56.609-Unspecified  intestinal obstruction, unspecified as to partial versus complete  obstruction; R10.31-Right lower quadrant pain; R19.4-Change in bowel  habit; Z90.49-Acquired absence of other specified parts of digestive  tract     A frontal projection of the upper abdomen and lower chest is obtained.  There is no previous study for comparison.     Distal end of the nasogastric tube is in the proximal to mid stomach.  The distal side-port is below the gastroesophageal junction.     There is moderate gas in the stomach and moderate gas and stool in the  large bowel. There is mild dilatation of small bowel loops in the upper  abdomen.     Included lung bases show coarse interstitial changes/scarring.     The cardiac loop moderate is in place.     Chronic degenerative changes of the thoracolumbar spine are seen with a  moderate levoscoliosis.       Impression:      1. Distal end of the nasogastric tube in the proximal to mid stomach.  The distal side-port is distal to the gastroesophageal junction.  This report was finalized on 03/06/2023 06:05 by Dr. Josesito Urbina MD.    CT Abdomen Pelvis With Contrast [[id number]] Collected: 03/05/23 2109     Updated: 03/05/23 2121    Narrative:      EXAM/TECHNIQUE: CT abdomen pelvis with IV contrast      INDICATION: Abdominal pain, acute, nonlocalized     COMPARISON: 03/30/2020     DLP: 283 mGy cm. Automated exposure control was also utilized to  decrease patient radiation dose.     FINDINGS:     Bibasilar atelectasis.     Diffuse hepatic steatosis. No suspicious focal liver lesion. Prior  cholecystectomy with prominent mild dilatation of the biliary tree is  likely related to reservoir phenomenon.     Pancreas, spleen, and RIGHT adrenal gland are unremarkable. No change in  small LEFT adrenal gland nodule likely representing a small adenoma.     No solid renal mass. No urolithiasis or hydronephrosis. No focal urinary  bladder wall thickening.     Sigmoid diverticulosis without diverticulitis. Moderate volume stool in  the RIGHT colon.      Multiple dilated small bowel loops in the abdomen and pelvis measuring  up to 3 cm with abrupt transition point in the RIGHT pelvis on axial  image 66 with completely decompressed small bowel distal to the  transition point. Feculent material is present in the dilated bowel  indicating stasis. Small amount of interloop fluid and edematous change  associated with the dilated loops. No definite pneumatosis. There is  peripheral air bubbles in some of the dilated small bowel loops but this  is felt to represent pseudopneumatosis related to trapped air. No portal  venous gas. No pneumoperitoneum.     No pelvic mass or pelvic collection. Prior hysterectomy. Nonaneurysmal  atherosclerotic abdominal aorta. Patent celiac artery, SMA, and LEONOR.  Patent portal vein and SMV. No enlarged abdominal or pelvic lymph nodes.     No acute abdominal wall soft tissue abnormality. Advanced multilevel  degenerative change in the lumbar spine. No acute osseous finding.       Impression:         1.  Dilated proximal to mid small bowel loops containing feculent  material with abrupt transition point in the RIGHT lower quadrant.  Findings are in keeping with a small bowel obstruction. Small amount  of  interloop fluid is noted amongst the dilated loops.     2.  Hepatic steatosis.     3.  Sigmoid diverticulosis without diverticulitis.  This report was finalized on 03/05/2023 21:18 by Dr. Toney Morales MD.        3/5 dilaudid iv   Morphine iv x2    zofran iv x1

## 2023-03-06 NOTE — H&P
Brittaney Rios MD FACS History and Physical     Referring Provider: Provider, No Known    Patient Care Team:  Aga Mcdermott DO as PCP - General  Aga Mcdermott DO as PCP - Family Medicine  Ulysses Ruiz DMD as Referring Physician (Dental General Practice)  Js Alaniz MD as Referring Physician (Cardiology)  Mo Jasso MD as Cardiologist (Cardiology)  Rony Lai MD as Consulting Physician (Gastroenterology)    Chief complaint:  Abdominal pain    Subjective .   History of present illness:  The patient is a 89 y.o. female who presents to the ED complaining of acute onset of periumbilical and right lower quadrant pain with associated nausea.  She denies any fevers or chills.  She states that she has noted decreased caliber stools over the last couple of days.  She denies any associated BRBPR, melena, or hematochezia.  She also denies any dysuria or hematuria. .    History:    Past Medical History:   Diagnosis Date   • Diabetes mellitus (HCC)    • Disc degeneration, lumbar    • Diverticulosis    • Family history of colonic polyps    • History of adenomatous polyp of colon    • Hypercholesterolemia    • Irregular heartbeat    • LAFB (left anterior fascicular block) 10/26/2018   • MVP (mitral valve prolapse)    • Osteoarthritis    • Sciatica    ,   Past Surgical History:   Procedure Laterality Date   • APPENDECTOMY     • BLADDER REPAIR     • CHOLECYSTECTOMY WITH INTRAOPERATIVE CHOLANGIOGRAM N/A 3/31/2020    Procedure: CHOLECYSTECTOMY LAPAROSCOPIC INTRAOPERATIVE CHOLANGIOGRAM;  Surgeon: Jenna Kinney MD;  Location: [facility];  Service: General;  Laterality: N/A;   • COLONOSCOPY  02/23/2010    diverticulosis   • COLONOSCOPY W/ POLYPECTOMY  11/06/2006    ASCENDING COLON ADENOMATOUS POLYP   • ENDOSCOPY  10/04/2006    SBBX UNREMARKABLE, SMALL HH, UREA NEG   • HYSTERECTOMY     • TONSILLECTOMY     ,   Family History   Problem Relation Age of Onset   • Colon polyps Son    • Stroke  Mother    • Heart attack Father    • GI problems Neg Hx    • Colon cancer Neg Hx    ,   Social History     Tobacco Use   • Smoking status: Never   • Smokeless tobacco: Never   Substance Use Topics   • Alcohol use: Yes     Comment: wine occasional   • Drug use: No   ,   Medications Prior to Admission   Medication Sig Dispense Refill Last Dose   • colestipol (COLESTID) 1 g tablet Take 1 tablet by mouth 4 (Four) Times a Day. 28 tablet 0 3/5/2023   • donepezil (ARICEPT) 10 MG tablet Take 1 tablet by mouth Every Night.   3/5/2023   • metoprolol succinate XL (TOPROL-XL) 25 MG 24 hr tablet Take 1 tablet by mouth Daily. Half in the am half in the pm.   3/5/2023   • Multiple Vitamins-Minerals (MULTIPLE VITAMINS/WOMENS PO) Take 1 tablet by mouth Daily.   3/5/2023   • pregabalin (LYRICA) 75 MG capsule Take 100 mg by mouth 3 (Three) Times a Day.   3/5/2023   • Psyllium (METAMUCIL FIBER PO) Take 1 package by mouth 2 (Two) Times a Day.   3/5/2023   • traZODone (DESYREL) 50 MG tablet Take 1 tablet by mouth Every Night.   3/4/2023   • acetaminophen (Tylenol) 325 MG tablet Take 2 tablets by mouth Every 6 (Six) Hours As Needed for Mild Pain .      • B Complex Vitamins (B COMPLEX 50 PO) Take 1 tablet by mouth Daily.      • BIOTIN 5000 PO Take 1 tablet by mouth Daily.      • Calcium Carbonate-Vit D-Min (CALTRATE 600+D PLUS PO) Take 1 tablet by mouth Daily.      • Cholecalciferol (VITAMIN D3) 5000 units capsule capsule Take 5,000 Units by mouth Daily.      • Magnesium 250 MG tablet Take  by mouth 2 (two) times a day.       and Allergies:  Penicillins, Quinine derivatives, Tizanidine, and Sulfa antibiotics    Current Facility-Administered Medications:   •  donepezil (ARICEPT) tablet 10 mg, 10 mg, Oral, Nightly, Brittaney Rios MD  •  [START ON 3/7/2023] Enoxaparin Sodium (LOVENOX) syringe 30 mg, 30 mg, Subcutaneous, Daily, Brittaney Rios MD  •  famotidine (PEPCID) injection 20 mg, 20 mg, Intravenous, BID, Brittaney Rios MD, 20 mg  at 03/06/23 0850  •  lactated ringers infusion, 125 mL/hr, Intravenous, Continuous, Brittaney Rios MD, Last Rate: 125 mL/hr at 03/06/23 0856, 125 mL/hr at 03/06/23 0856  •  metoprolol tartrate (LOPRESSOR) injection 5 mg, 5 mg, Intravenous, Q6H, Brittaney Rios MD, 5 mg at 03/06/23 0850  •  morphine injection 4 mg, 4 mg, Intravenous, Q4H PRN, Brittaney Rios MD, 4 mg at 03/06/23 1118  •  ondansetron (ZOFRAN) injection 4 mg, 4 mg, Intravenous, Q4H PRN, Brittaney Rios MD, 4 mg at 03/06/23 1047  •  pregabalin (LYRICA) capsule 100 mg, 100 mg, Oral, TID, Brittaney Rios MD  •  [COMPLETED] Insert Peripheral IV, , , Once **AND** sodium chloride 0.9 % flush 10 mL, 10 mL, Intravenous, PRN, Azeem Hanley MD  •  traZODone (DESYREL) tablet 50 mg, 50 mg, Oral, Nightly, Brittaney Rios MD    Review of Systems:    All organ systems were evaluated and found negative except those which are mentioned in the History of Present Illness.      Objective     Physical Exam:    Vital Signs:  Temp:  [98 °F (36.7 °C)-98.4 °F (36.9 °C)] 98.4 °F (36.9 °C)  Heart Rate:  [62-80] 66  Resp:  [16-23] 16  BP: ()/(55-98) 191/92    Constitutional:    Well-developed, well-nourished in no acute distress  Eyes:     Extraocular movements intact; pupils equal, round, and reactive  Ears, Nose, Mouth, Throat:  Hearing intact, nose midline, no mucosal lesions  Cardiovascular:   Regular rate and rhythm   Respiratory:    Clear to auscultation bilaterally  Gastrointestinal:   Soft, protuberant, tender to palpation especially RLQ  Genitourinary:    Deferred  Musculoskeletal:   Full range of motion, no muscle wasting, no weakness  Skin:     No rashes or excoriations  Neurological:    Moves all extremities, sensation intact  Psychiatric:    Alert and oriented to person, place, and time  Hematologic/Lymphatic/Immune: No lymphadenopathy      Results Review:     Lab Results (last 24 hours)     Procedure Component Value Units Date/Time    Basic  Metabolic Panel [[id number]]  (Abnormal) Collected: 03/06/23 0803    Specimen: Blood Updated: 03/06/23 0833     Glucose 145 mg/dL      BUN 19 mg/dL      Creatinine 0.70 mg/dL      Sodium 136 mmol/L      Potassium 3.8 mmol/L      Chloride 96 mmol/L      CO2 27.0 mmol/L      Calcium 9.3 mg/dL      BUN/Creatinine Ratio 27.1     Anion Gap 13.0 mmol/L      eGFR 80.8 mL/min/1.73     Narrative:      GFR Normal >60  Chronic Kidney Disease <60  Kidney Failure <15    The GFR formula is only valid for adults with stable renal function between ages 18 and 70.    Phosphorus [[id number]]  (Normal) Collected: 03/06/23 0803    Specimen: Blood Updated: 03/06/23 0833     Phosphorus 4.4 mg/dL     Magnesium [[id number]]  (Normal) Collected: 03/06/23 0803    Specimen: Blood Updated: 03/06/23 0833     Magnesium 2.0 mg/dL     CBC (No Diff) [[id number]]  (Abnormal) Collected: 03/06/23 0803    Specimen: Blood Updated: 03/06/23 0817     WBC 13.06 10*3/mm3      RBC 4.35 10*6/mm3      Hemoglobin 12.5 g/dL      Hematocrit 38.2 %      MCV 87.8 fL      MCH 28.7 pg      MCHC 32.7 g/dL      RDW 14.2 %      RDW-SD 46.2 fl      MPV 9.4 fL      Platelets 347 10*3/mm3     POC Glucose Once [[id number]]  (Abnormal) Collected: 03/06/23 0018    Specimen: Blood Updated: 03/06/23 0038     Glucose 180 mg/dL      Comment: : [id number] Heather Richmond ID: [id number]       Urinalysis With Culture If Indicated - Urine, Catheter [[id number]]  (Abnormal) Collected: 03/05/23 2100    Specimen: Urine, Catheter Updated: 03/05/23 2114     Color, UA Yellow     Appearance, UA Cloudy     pH, UA 8.5     Specific Gravity, UA 1.012     Glucose, UA Negative     Ketones, UA Negative     Bilirubin, UA Negative     Blood, UA Negative     Protein, UA Negative     Leuk Esterase, UA Negative     Nitrite, UA Negative     Urobilinogen, UA 0.2 E.U./dL    Narrative:      In absence of clinical symptoms, the presence of pyuria, bacteria, and/or nitrites on the urinalysis result does not  correlate with infection.  Urine microscopic not indicated.    Comprehensive Metabolic Panel [[id number]]  (Abnormal) Collected: 03/05/23 1955    Specimen: Blood Updated: 03/05/23 2019     Glucose 170 mg/dL      BUN 21 mg/dL      Creatinine 0.75 mg/dL      Sodium 133 mmol/L      Potassium 3.7 mmol/L      Chloride 95 mmol/L      CO2 24.0 mmol/L      Calcium 9.7 mg/dL      Total Protein 7.7 g/dL      Albumin 4.6 g/dL      ALT (SGPT) 20 U/L      AST (SGOT) 19 U/L      Alkaline Phosphatase 86 U/L      Total Bilirubin 0.2 mg/dL      Globulin 3.1 gm/dL      A/G Ratio 1.5 g/dL      BUN/Creatinine Ratio 28.0     Anion Gap 14.0 mmol/L      eGFR 74.3 mL/min/1.73     Narrative:      GFR Normal >60  Chronic Kidney Disease <60  Kidney Failure <15    The GFR formula is only valid for adults with stable renal function between ages 18 and 70.    Lactic Acid, Plasma [[id number]]  (Normal) Collected: 03/05/23 1955    Specimen: Blood Updated: 03/05/23 2016     Lactate 1.7 mmol/L     Lipase [[id number]]  (Normal) Collected: 03/05/23 1955    Specimen: Blood Updated: 03/05/23 2014     Lipase 49 U/L     CBC & Differential [[id number]]  (Abnormal) Collected: 03/05/23 1955    Specimen: Blood Updated: 03/05/23 2002    Narrative:      The following orders were created for panel order CBC & Differential.  Procedure                               Abnormality         Status                     ---------                               -----------         ------                     CBC Auto Differential[[id number]]        Abnormal            Final result                 Please view results for these tests on the individual orders.    CBC Auto Differential [[id number]]  (Abnormal) Collected: 03/05/23 1955    Specimen: Blood Updated: 03/05/23 2002     WBC 14.52 10*3/mm3      RBC 4.52 10*6/mm3      Hemoglobin 13.1 g/dL      Hematocrit 39.1 %      MCV 86.5 fL      MCH 29.0 pg      MCHC 33.5 g/dL      RDW 14.0 %      RDW-SD 44.4 fl      MPV 9.3 fL       Platelets 354 10*3/mm3      Neutrophil % 83.7 %      Lymphocyte % 8.8 %      Monocyte % 5.0 %      Eosinophil % 1.7 %      Basophil % 0.5 %      Immature Grans % 0.3 %      Neutrophils, Absolute 12.15 10*3/mm3      Lymphocytes, Absolute 1.28 10*3/mm3      Monocytes, Absolute 0.72 10*3/mm3      Eosinophils, Absolute 0.25 10*3/mm3      Basophils, Absolute 0.07 10*3/mm3      Immature Grans, Absolute 0.05 10*3/mm3      nRBC 0.0 /100 WBC         Imaging Results (Last 24 Hours)     Procedure Component Value Units Date/Time    FL Small Bowel Follow Through Single-Contrast [[id number]] Resulted: 03/06/23 0917     Updated: 03/06/23 0917    XR Abdomen KUB [[id number]] Collected: 03/06/23 0602     Updated: 03/06/23 0608    Narrative:      EXAMINATION: XR ABDOMEN KUB-     3/5/2023 10:30 PM CST     HISTORY: nasogastric tube placed, confirm position; K56.609-Unspecified  intestinal obstruction, unspecified as to partial versus complete  obstruction; R10.31-Right lower quadrant pain; R19.4-Change in bowel  habit; Z90.49-Acquired absence of other specified parts of digestive  tract     A frontal projection of the upper abdomen and lower chest is obtained.  There is no previous study for comparison.     Distal end of the nasogastric tube is in the proximal to mid stomach.  The distal side-port is below the gastroesophageal junction.     There is moderate gas in the stomach and moderate gas and stool in the  large bowel. There is mild dilatation of small bowel loops in the upper  abdomen.     Included lung bases show coarse interstitial changes/scarring.     The cardiac loop moderate is in place.     Chronic degenerative changes of the thoracolumbar spine are seen with a  moderate levoscoliosis.       Impression:      1. Distal end of the nasogastric tube in the proximal to mid stomach.  The distal side-port is distal to the gastroesophageal junction.  This report was finalized on 03/06/2023 06:05 by Dr. Josesito Urbina MD.    CT  Abdomen Pelvis With Contrast [[id number]] Collected: 03/05/23 2109     Updated: 03/05/23 2121    Narrative:      EXAM/TECHNIQUE: CT abdomen pelvis with IV contrast     INDICATION: Abdominal pain, acute, nonlocalized     COMPARISON: 03/30/2020     DLP: 283 mGy cm. Automated exposure control was also utilized to  decrease patient radiation dose.     FINDINGS:     Bibasilar atelectasis.     Diffuse hepatic steatosis. No suspicious focal liver lesion. Prior  cholecystectomy with prominent mild dilatation of the biliary tree is  likely related to reservoir phenomenon.     Pancreas, spleen, and RIGHT adrenal gland are unremarkable. No change in  small LEFT adrenal gland nodule likely representing a small adenoma.     No solid renal mass. No urolithiasis or hydronephrosis. No focal urinary  bladder wall thickening.     Sigmoid diverticulosis without diverticulitis. Moderate volume stool in  the RIGHT colon.      Multiple dilated small bowel loops in the abdomen and pelvis measuring  up to 3 cm with abrupt transition point in the RIGHT pelvis on axial  image 66 with completely decompressed small bowel distal to the  transition point. Feculent material is present in the dilated bowel  indicating stasis. Small amount of interloop fluid and edematous change  associated with the dilated loops. No definite pneumatosis. There is  peripheral air bubbles in some of the dilated small bowel loops but this  is felt to represent pseudopneumatosis related to trapped air. No portal  venous gas. No pneumoperitoneum.     No pelvic mass or pelvic collection. Prior hysterectomy. Nonaneurysmal  atherosclerotic abdominal aorta. Patent celiac artery, SMA, and LEONOR.  Patent portal vein and SMV. No enlarged abdominal or pelvic lymph nodes.     No acute abdominal wall soft tissue abnormality. Advanced multilevel  degenerative change in the lumbar spine. No acute osseous finding.       Impression:         1.  Dilated proximal to mid small bowel loops  containing feculent  material with abrupt transition point in the RIGHT lower quadrant.  Findings are in keeping with a small bowel obstruction. Small amount of  interloop fluid is noted amongst the dilated loops.     2.  Hepatic steatosis.     3.  Sigmoid diverticulosis without diverticulitis.  This report was finalized on 03/05/2023 21:18 by Dr. Toney Morales MD.            Assessment & Plan     Small bowel obstruction    She will be admitted, hydrated, decompressed with NGT, and serially examined.  The patient has a history of cholecystectomy, appendectomy, and hysterectomy with bilateral salpingo-oophorectomy.  The etiology of the small bowel obstruction is likely adhesions.  She appears nontoxic, but she is rather tender.  Will order SBFT today to more closely determine the completeness of her obstruction.  Findings and plans were discussed with the patient and her son.      Brittaney Rios MD  03/06/23  12:17 CST

## 2023-03-06 NOTE — PROGRESS NOTES
Patient has had increased pain, nausea, and vomiting with the SBFT.  Contrast has not passed into the colon.  NGT was turned back on and>2L has been aspirated.  She will undergo diagnostic laparoscopy, possible laparotomy with lysis of adhesions and possible bowel resection.  The risks, benefits, complications, and possible alternatives were discussed with the patient and her family who agreed to proceed.

## 2023-03-06 NOTE — ANESTHESIA POSTPROCEDURE EVALUATION
"Patient: Hannah Dasilva    Procedure Summary     Date: 03/06/23 Room / Location:  [facility] OR 09 /  [facility]    Anesthesia Start: 1406 Anesthesia Stop: 1600    Procedures:       DIAGNOSTIC LAPAROSCOPY (Abdomen)      LAPAROTOMY EXPLORATORY SMALL BOWEL RESECTION (Abdomen) Diagnosis:       Partial small bowel obstruction (HCC)      (Partial small bowel obstruction (HCC) [K56.600])    Surgeons: Brittaney Rios MD Provider: Keith Weinstein CRNA    Anesthesia Type: general ASA Status: 3 - Emergent          Anesthesia Type: general    Vitals  Vitals Value Taken Time   /71 03/06/23 1658   Temp 97.6 °F (36.4 °C) 03/06/23 1655   Pulse 92 03/06/23 1659   Resp 23 03/06/23 1645   SpO2 96 % 03/06/23 1659   Vitals shown include unvalidated device data.        Post Anesthesia Care and Evaluation    Patient location during evaluation: PACU  Patient participation: complete - patient participated  Level of consciousness: awake and alert  Pain management: adequate    Airway patency: patent  Anesthetic complications: No anesthetic complications    Cardiovascular status: acceptable  Respiratory status: acceptable  Hydration status: acceptable    Comments: Blood pressure 153/72, pulse 96, temperature 98.5 °F (36.9 °C), temperature source Oral, resp. rate 16, height 172.7 cm (68\"), weight 72.6 kg (160 lb), SpO2 96 %, not currently breastfeeding.    Pt discharged from PACU based on major score >8      "

## 2023-03-06 NOTE — ED NOTES
Pt in CT. Family asked for belongings bag for dirty clothes. Bag given. Then asked for a belongings bag for clean clothes. Additional bag given.

## 2023-03-06 NOTE — OP NOTE
Preoperative diagnosis:     Small bowel obstruction  Postoperative diagnosis:    same  Surgeon:     Brittaney Rios MD FACS  Procedure:    Diagnostic laparoscopy, laparoscopic lysis of adhesions, exploratory laparotomy, lysis of adhesions, small bowel resection  Anesthesia:    Get loc   EBL:     50  IVF:     See anesthesia notes  Indications:     The patient is a 89-year-old female who presents with findings of small bowel obstruction.  She presents for lysis of adhesions.  The risks, benefits, complications, and possible alternatives of the procedure were discussed with the patient who agreed to proceed.  Description of procedure:  The patient was laid supine.  The abdomen was prepped and draped in the usual sterile fashion.  The abdomen was entered with veress.  A 5mm trocar was placed in the midline supraumbilical position and each upper quadrant under direct visualization.  There were adhesions from the greater omentum to the midline infraumbilical region.  These were taken down with bovie.  At the right paramedian incision, there were also tight adhesions from the greater omentum.  These were partially taken down with bovie.  There was a firm mass deep to the greater omentum.  The small bowel was noted to be dilated and decompressed entering the area of induration.  A midline incision was then created.  It was noted that the firm mass was actually a closed loop obstruction of a segment of small bowel.  A very tight band of adhesion with the greater omentum was the point of obstruction.  bovie was used to lysis of adhesions.  The small bowel was dark purple.  It did not improve with reduction.  The small bowel was then run from the cecum to ligament of Treitz.  A ENMA 75mm blue stapler was used to transect the small bowel proximal and distal to the area of necrosis.  Enseal was used to transect the mesentery.  A side by side functional end to end stapled enteroenteric anastomosis was created with ENMA 75mm and  90mm TA blue staplers.  The staple line was then buried with 2-0 silk in interrupted Lembert fashion.  The mesenteric defect was closed with 2-0 silk.  The peritoneal cavity was copiously irrigated with sterile saline with Mefoxin.  The fascia was closed with #1 looped PDS x2.   The skin was closed with staples.  Dry dressings were applied.  The sponge, needle, and instrument counts were correct at the end of the case.  Findings:    Closed loop obstruction of a segment of small bowel causing necrotic bowel  Complications:   None  Disposition:    Good to PACU

## 2023-03-06 NOTE — ED NOTES
"Nursing report ED to floor  Hannah Dasilva  89 y.o.  female    HPI:   Chief Complaint   Patient presents with    Abdominal Pain       Admitting doctor:   Brittaney Rios MD    Consulting provider(s):  Consults       No orders found from 2/4/2023 to 3/6/2023.             Admitting diagnosis:   The primary encounter diagnosis was Small bowel obstruction (HCC). Diagnoses of Abdominal pain, acute, right lower quadrant, Decreased stooling, and History of cholecystectomy were also pertinent to this visit.    Code status:   Current Code Status       Date Active Code Status Order ID Comments User Context       Prior            Allergies:   Penicillins, Quinine derivatives, Tizanidine, and Sulfa antibiotics    Intake and Output    Intake/Output Summary (Last 24 hours) at 3/5/2023 2241  Last data filed at 3/5/2023 2055  Gross per 24 hour   Intake 500 ml   Output --   Net 500 ml       Weight:       03/05/23 1915   Weight: 72.6 kg (160 lb)       Most recent vitals:   Vitals:    03/05/23 1912 03/05/23 1915 03/05/23 2224   BP: 173/94  170/97   BP Location: Right arm     Patient Position: Lying     Pulse: 72  67   Resp: 23  18   Temp: 98 °F (36.7 °C)     TempSrc: Oral     SpO2: 100%  97%   Weight:  72.6 kg (160 lb)    Height: 172.7 cm (68\")       Oxygen Therapy: .    Active LDAs/IV Access:   Lines, Drains & Airways       Active LDAs       Name Placement date Placement time Site Days    Peripheral IV 03/05/23 1916 Left Antecubital 03/05/23 1916  Antecubital  less than 1    NG/OG Tube Nasogastric 18 Fr Right nostril 03/05/23 2220  Right nostril  less than 1                    Labs (abnormal labs have a star):   Labs Reviewed   COMPREHENSIVE METABOLIC PANEL - Abnormal; Notable for the following components:       Result Value    Glucose 170 (*)     Sodium 133 (*)     Chloride 95 (*)     Calcium 9.7 (*)     BUN/Creatinine Ratio 28.0 (*)     All other components within normal limits    Narrative:     GFR Normal >60  Chronic " Kidney Disease <60  Kidney Failure <15    The GFR formula is only valid for adults with stable renal function between ages 18 and 70.   URINALYSIS W/ CULTURE IF INDICATED - Abnormal; Notable for the following components:    Appearance, UA Cloudy (*)     pH, UA 8.5 (*)     All other components within normal limits    Narrative:     In absence of clinical symptoms, the presence of pyuria, bacteria, and/or nitrites on the urinalysis result does not correlate with infection.  Urine microscopic not indicated.   CBC WITH AUTO DIFFERENTIAL - Abnormal; Notable for the following components:    WBC 14.52 (*)     Neutrophil % 83.7 (*)     Lymphocyte % 8.8 (*)     Neutrophils, Absolute 12.15 (*)     All other components within normal limits   LIPASE - Normal   LACTIC ACID, PLASMA - Normal   CBC AND DIFFERENTIAL    Narrative:     The following orders were created for panel order CBC & Differential.  Procedure                               Abnormality         Status                     ---------                               -----------         ------                     CBC Auto Differential[[id number]]        Abnormal            Final result                 Please view results for these tests on the individual orders.       Meds given in ED:   Medications   sodium chloride 0.9 % flush 10 mL (has no administration in time range)   lactated ringers infusion (has no administration in time range)   benzocaine (HURRICAINE) 20 % liquid solution 2 spray (has no administration in time range)   ondansetron (ZOFRAN) injection 4 mg (4 mg Intravenous Given 3/5/23 1955)   morphine injection 4 mg (4 mg Intravenous Given 3/5/23 1953)   lactated ringers bolus 500 mL (0 mL Intravenous Stopped 3/5/23 2055)   iopamidol (ISOVUE-300) 61 % injection 100 mL (100 mL Intravenous Given 3/5/23 2106)   HYDROmorphone (DILAUDID) injection 0.5 mg (0.5 mg Intravenous Given 3/5/23 2134)     lactated ringers, 50 mL/hr         NIH Stroke Scale:        Isolation/Infection(s):  No active isolations   C.difficile     COVID Testing  Collected .  Resulted .    Nursing report ED to floor:  Mental status: .  Ambulatory status: .  Precautions: .    ED nurse phone extentsion- ..

## 2023-03-06 NOTE — ANESTHESIA PREPROCEDURE EVALUATION
Anesthesia Evaluation     Patient summary reviewed   no history of anesthetic complications:  NPO Solid Status: > 8 hours             Airway   Mallampati: II  TM distance: >3 FB  Neck ROM: full  Dental      Pulmonary    (-) COPD, asthma, sleep apnea, not a smoker  Cardiovascular   Exercise tolerance: poor (<4 METS)    ECG reviewed    (+) hypertension, dysrhythmias, hyperlipidemia,   (-) pacemaker, past MI, angina, cardiac stents      Neuro/Psych  (-) seizures, TIA, CVA  GI/Hepatic/Renal/Endo    (+)   diabetes mellitus,   (-) GERD, liver disease, no renal disease    Musculoskeletal     Abdominal    Substance History      OB/GYN          Other                          Anesthesia Plan    ASA 3 - emergent     general   Rapid sequence  (increased pain, nausea, and vomiting with the SBFT.  Contrast has not passed into the colon.  NGT was turned back on and>2L has been aspirated, plus another 200 since in holding. RSI.    Bifascicular block )  intravenous induction     Anesthetic plan, risks, benefits, and alternatives have been provided, discussed and informed consent has been obtained with: patient.

## 2023-03-07 LAB
ANION GAP SERPL CALCULATED.3IONS-SCNC: 12 MMOL/L (ref 5–15)
BUN SERPL-MCNC: 23 MG/DL (ref 8–23)
BUN/CREAT SERPL: 27.1 (ref 7–25)
CALCIUM SPEC-SCNC: 8.9 MG/DL (ref 8.2–9.6)
CHLORIDE SERPL-SCNC: 102 MMOL/L (ref 98–107)
CO2 SERPL-SCNC: 27 MMOL/L (ref 22–29)
CREAT SERPL-MCNC: 0.85 MG/DL (ref 0.57–1)
DEPRECATED RDW RBC AUTO: 47.7 FL (ref 37–54)
EGFRCR SERPLBLD CKD-EPI 2021: 64 ML/MIN/1.73
ERYTHROCYTE [DISTWIDTH] IN BLOOD BY AUTOMATED COUNT: 14.6 % (ref 12.3–15.4)
GLUCOSE SERPL-MCNC: 208 MG/DL (ref 65–99)
HCT VFR BLD AUTO: 39.3 % (ref 34–46.6)
HGB BLD-MCNC: 12.5 G/DL (ref 12–15.9)
MAGNESIUM SERPL-MCNC: 2.3 MG/DL (ref 1.7–2.3)
MCH RBC QN AUTO: 28.5 PG (ref 26.6–33)
MCHC RBC AUTO-ENTMCNC: 31.8 G/DL (ref 31.5–35.7)
MCV RBC AUTO: 89.5 FL (ref 79–97)
PHOSPHATE SERPL-MCNC: 3.5 MG/DL (ref 2.5–4.5)
PLATELET # BLD AUTO: 331 10*3/MM3 (ref 140–450)
PMV BLD AUTO: 10 FL (ref 6–12)
POTASSIUM SERPL-SCNC: 3.8 MMOL/L (ref 3.5–5.2)
QT INTERVAL: 410 MS
QT INTERVAL: 456 MS
QTC INTERVAL: 451 MS
QTC INTERVAL: 478 MS
RBC # BLD AUTO: 4.39 10*6/MM3 (ref 3.77–5.28)
SODIUM SERPL-SCNC: 141 MMOL/L (ref 136–145)
WBC NRBC COR # BLD: 13.16 10*3/MM3 (ref 3.4–10.8)

## 2023-03-07 PROCEDURE — 25010000002 MORPHINE PER 10 MG: Performed by: SPECIALIST

## 2023-03-07 PROCEDURE — 99024 POSTOP FOLLOW-UP VISIT: CPT | Performed by: SPECIALIST

## 2023-03-07 PROCEDURE — 80048 BASIC METABOLIC PNL TOTAL CA: CPT | Performed by: SPECIALIST

## 2023-03-07 PROCEDURE — 83735 ASSAY OF MAGNESIUM: CPT | Performed by: SPECIALIST

## 2023-03-07 PROCEDURE — 85027 COMPLETE CBC AUTOMATED: CPT | Performed by: SPECIALIST

## 2023-03-07 PROCEDURE — 84100 ASSAY OF PHOSPHORUS: CPT | Performed by: SPECIALIST

## 2023-03-07 PROCEDURE — 25010000002 ENOXAPARIN PER 10 MG: Performed by: SPECIALIST

## 2023-03-07 PROCEDURE — 25010000002 CEFOXITIN PER 1 G: Performed by: SPECIALIST

## 2023-03-07 RX ORDER — FAMOTIDINE 10 MG/ML
20 INJECTION, SOLUTION INTRAVENOUS DAILY
Status: DISCONTINUED | OUTPATIENT
Start: 2023-03-08 | End: 2023-03-10

## 2023-03-07 RX ADMIN — MORPHINE SULFATE 4 MG: 4 INJECTION, SOLUTION INTRAMUSCULAR; INTRAVENOUS at 17:33

## 2023-03-07 RX ADMIN — CEFOXITIN 1 G: 1 INJECTION, POWDER, FOR SOLUTION INTRAVENOUS at 10:10

## 2023-03-07 RX ADMIN — PREGABALIN 100 MG: 100 CAPSULE ORAL at 20:12

## 2023-03-07 RX ADMIN — ENOXAPARIN SODIUM 30 MG: 100 INJECTION SUBCUTANEOUS at 08:44

## 2023-03-07 RX ADMIN — METOPROLOL TARTRATE 5 MG: 5 INJECTION INTRAVENOUS at 01:44

## 2023-03-07 RX ADMIN — TRAZODONE HYDROCHLORIDE 50 MG: 50 TABLET ORAL at 20:12

## 2023-03-07 RX ADMIN — MORPHINE SULFATE 4 MG: 4 INJECTION, SOLUTION INTRAMUSCULAR; INTRAVENOUS at 12:46

## 2023-03-07 RX ADMIN — CEFOXITIN 1 G: 1 INJECTION, POWDER, FOR SOLUTION INTRAVENOUS at 01:44

## 2023-03-07 RX ADMIN — METOPROLOL TARTRATE 5 MG: 5 INJECTION INTRAVENOUS at 14:38

## 2023-03-07 RX ADMIN — FAMOTIDINE 20 MG: 10 INJECTION INTRAVENOUS at 08:44

## 2023-03-07 RX ADMIN — SODIUM CHLORIDE, POTASSIUM CHLORIDE, SODIUM LACTATE AND CALCIUM CHLORIDE 125 ML/HR: 600; 310; 30; 20 INJECTION, SOLUTION INTRAVENOUS at 10:10

## 2023-03-07 RX ADMIN — MORPHINE SULFATE 4 MG: 4 INJECTION, SOLUTION INTRAMUSCULAR; INTRAVENOUS at 06:56

## 2023-03-07 RX ADMIN — SODIUM CHLORIDE, POTASSIUM CHLORIDE, SODIUM LACTATE AND CALCIUM CHLORIDE 125 ML/HR: 600; 310; 30; 20 INJECTION, SOLUTION INTRAVENOUS at 17:33

## 2023-03-07 RX ADMIN — METOPROLOL TARTRATE 5 MG: 5 INJECTION INTRAVENOUS at 20:12

## 2023-03-07 RX ADMIN — DONEPEZIL HYDROCHLORIDE 10 MG: 10 TABLET ORAL at 20:12

## 2023-03-07 RX ADMIN — METOPROLOL TARTRATE 5 MG: 5 INJECTION INTRAVENOUS at 08:48

## 2023-03-07 NOTE — PROGRESS NOTES
Brittaney Rios MD FACS  Progress Note     LOS: 2 days   Patient Care Team:  Aga Mcdermott DO as PCP - General  Aga Mcdermott DO as PCP - Family Medicine  Ulysses Ruiz DMD as Referring Physician (Dental General Practice)  Js Alaniz MD as Referring Physician (Cardiology)  Mo Jasso MD as Cardiologist (Cardiology)  Rony Lai MD as Consulting Physician (Gastroenterology)      Subjective     Chief complaint: POD#1    History of Present Illness:   No events over pm.  Eating a popsicle.  Denies nausea.  Pain controlled       The following portions of the patient's history were reviewed and updated as appropriate: allergies, current medications, past family history, past medical history, past social history, past surgical history, and problem list.    Objective     Physical Exam:    Vital Signs:  Temp:  [97.3 °F (36.3 °C)-99.8 °F (37.7 °C)] 98.2 °F (36.8 °C)  Heart Rate:  [] 90  Resp:  [15-23] 16  BP: (128-191)/(49-98) 128/49      Constitutional:    Well-developed, well-nourished in no acute distress  Eyes:     Extraocular movements intact; pupils equal, round, and reactive  Ears, Nose, Mouth, Throat:  Hearing intact, nose midline, no mucosal lesions  Cardiovascular:   Regular rate and rhythm   Respiratory:    Clear to auscultation bilaterally  Gastrointestinal:   Soft, appropriately tender  Genitourinary:    Deferred  Musculoskeletal:   Full range of motion, no muscle wasting, no weakness  Skin:     No rashes or excoriations  Neurological:    Moves all extremities, sensation intact  Psychiatric:    Alert and oriented to person, place, and time  Hematologic/Lymphatic/Immune: No lymphadenopathy      Results Review:    Lab Results (last 24 hours)     Procedure Component Value Units Date/Time    Tissue Pathology Exam [[id number]] Collected: 03/06/23 1517    Specimen: Tissue from Colon Updated: 03/07/23 0819    Basic Metabolic Panel [[id number]]  (Abnormal) Collected:  03/07/23 0405    Specimen: Blood Updated: 03/07/23 0459     Glucose 208 mg/dL      BUN 23 mg/dL      Creatinine 0.85 mg/dL      Sodium 141 mmol/L      Potassium 3.8 mmol/L      Chloride 102 mmol/L      CO2 27.0 mmol/L      Calcium 8.9 mg/dL      BUN/Creatinine Ratio 27.1     Anion Gap 12.0 mmol/L      eGFR 64.0 mL/min/1.73     Narrative:      GFR Normal >60  Chronic Kidney Disease <60  Kidney Failure <15    The GFR formula is only valid for adults with stable renal function between ages 18 and 70.    Phosphorus [[id number]]  (Normal) Collected: 03/07/23 0405    Specimen: Blood Updated: 03/07/23 0459     Phosphorus 3.5 mg/dL     Magnesium [[id number]]  (Normal) Collected: 03/07/23 0405    Specimen: Blood Updated: 03/07/23 0459     Magnesium 2.3 mg/dL     CBC (No Diff) [[id number]]  (Abnormal) Collected: 03/07/23 0405    Specimen: Blood Updated: 03/07/23 0445     WBC 13.16 10*3/mm3      RBC 4.39 10*6/mm3      Hemoglobin 12.5 g/dL      Hematocrit 39.3 %      MCV 89.5 fL      MCH 28.5 pg      MCHC 31.8 g/dL      RDW 14.6 %      RDW-SD 47.7 fl      MPV 10.0 fL      Platelets 331 10*3/mm3     POC Glucose Once [[id number]]  (Abnormal) Collected: 03/06/23 1603    Specimen: Blood Updated: 03/06/23 1615     Glucose 166 mg/dL      Comment: : [id number] Laytonvinay PamMeter ID: [id number]       Basic Metabolic Panel [[id number]]  (Abnormal) Collected: 03/06/23 0803    Specimen: Blood Updated: 03/06/23 0833     Glucose 145 mg/dL      BUN 19 mg/dL      Creatinine 0.70 mg/dL      Sodium 136 mmol/L      Potassium 3.8 mmol/L      Chloride 96 mmol/L      CO2 27.0 mmol/L      Calcium 9.3 mg/dL      BUN/Creatinine Ratio 27.1     Anion Gap 13.0 mmol/L      eGFR 80.8 mL/min/1.73     Narrative:      GFR Normal >60  Chronic Kidney Disease <60  Kidney Failure <15    The GFR formula is only valid for adults with stable renal function between ages 18 and 70.    Phosphorus [[id number]]  (Normal) Collected: 03/06/23 0803    Specimen: Blood Updated:  03/06/23 0833     Phosphorus 4.4 mg/dL     Magnesium [[id number]]  (Normal) Collected: 03/06/23 0803    Specimen: Blood Updated: 03/06/23 0833     Magnesium 2.0 mg/dL         Imaging Results (Last 24 Hours)     Procedure Component Value Units Date/Time    FL Small Bowel Follow Through Single-Contrast [[id number]] Collected: 03/06/23 1347     Updated: 03/06/23 1354    Narrative:      EXAMINATION: FL SMALL BOWEL FOLLOW THROUGH SINGLE-CONTRAST- 3/6/2023  1:47 PM CST     HISTORY: sbo, use gastrografin; K56.609-Unspecified intestinal  obstruction, unspecified as to partial versus complete obstruction;  R10.31-Right lower quadrant pain; R19.4-Change in bowel habit;  Z90.49-Acquired absence of other specified parts of digestive tract;  K56.600-Partial intestinal obstruction, unspecified as to cause     REPORT: A  view of the abdomen was obtained, demonstrating  gas-filled nondistended small bowel loops in the central and left  abdomen, upper left pelvis. An NG tube is present and appears to be in  satisfactory position. Gastrografin contrast was administered through  the NG tube and images were obtained at 15 minutes, 30 minutes, 60  minutes, 2 hours and 3 hours 15 minutes. The examination was stopped  after 3 hours 15 minutes, the patient was reportedly been taken to the  operating room. On the 60 minute image post the contrast is in the  stomach and there is reflux of contrast into the distal esophagus. At 2  hours, contrast has moved into the proximal small bowel, which is  distended, with loops measuring in the range of 4.5 cm. There is  pre-existing intravenous contrast in the bladder. On the last image  obtained at 3 hours 15 minutes, the contrast appears to be primarily  within the jejunum. The appearance is concerning for mechanical small  bowel obstruction.       Impression:      Slow transit of Gastrografin contrast within the upper GI  tract and jejunum with dilated loops of jejunum, concerning  for  mechanical small bowel obstruction. The examination was stopped when the  patient was transferred to the OR by Dr. Rios.  This report was finalized on 03/06/2023 13:51 by Dr. Demetris Merida MD.            Assessment & Plan     POD#1 marielle mendez, BRANDI rsxn    Continue present care      April L MD Gabriel  03/07/23  08:21 CST

## 2023-03-07 NOTE — CASE MANAGEMENT/SOCIAL WORK
Discharge Planning Assessment  [facility]     Patient Name: Hannah Dasilva  MRN: [id number]  Today's Date: 3/7/2023    Admit Date: 3/5/2023        Discharge Needs Assessment     Row Name 03/07/23 0959       Living Environment    People in Home alone    Current Living Arrangements home    Primary Care Provided by child(adrian)    Provides Primary Care For no one    Family Caregiver if Needed child(adrian), adult    Family Caregiver Names 3 sons    Able to Return to Prior Arrangements yes       Resource/Environmental Concerns    Resource/Environmental Concerns none       Transition Planning    Patient/Family Anticipates Transition to home    Transportation Anticipated family or friend will provide       Discharge Needs Assessment    Readmission Within the Last 30 Days no previous admission in last 30 days    Equipment Currently Used at Home cane, straight;wheelchair    Concerns to be Addressed no discharge needs identified    Equipment Needed After Discharge none    Discharge Coordination/Progress spoke to patient who lives alone with caregiver friend Rosio that assists her 3 days per week; has RX coverage and PCP; sons live nearby and assist patient will follow for DC needs               Discharge Plan    No documentation.               Continued Care and Services - Admitted Since 3/5/2023    Coordination has not been started for this encounter.          Demographic Summary    No documentation.                Functional Status    No documentation.                Psychosocial    No documentation.                Abuse/Neglect    No documentation.                Legal    No documentation.                Substance Abuse    No documentation.                Patient Forms    No documentation.                   Nevaeh Rojas RN

## 2023-03-07 NOTE — PROGRESS NOTES
"Pharmacy Renal Dose Conversion    Hannah Dasilva is a 89 y.o. year old female  172.7 cm (68\") 72.6 kg (160 lb)    Estimated Creatinine Clearance: 47.4 mL/min (by C-G formula based on SCr of 0.85 mg/dL).   Creatinine   Date Value Ref Range Status   03/07/2023 0.85 0.57 - 1.00 mg/dL Final   03/06/2023 0.70 0.57 - 1.00 mg/dL Final   03/05/2023 0.75 0.57 - 1.00 mg/dL Final   06/05/2022 0.7 0.5 - 0.9 mg/dL Final   06/04/2022 0.7 0.5 - 0.9 mg/dL Final       PLAN  Based on prescribing information provided by the drug , Famotidine 20mg iv Q12H,  has been changed to Famotidine 20mg iv daily    Adjusted per the directives and guidelines established by [facility] Therapeutics Committee and [facility] Medical Executive Committee.  Pharmacy will continue to monitor daily and make further adjustment(s) accordingly.    Toney Polk, PharmD  03/07/2314:09 CST    "

## 2023-03-07 NOTE — PLAN OF CARE
Goal Outcome Evaluation:  Plan of Care Reviewed With: patient           Outcome Evaluation: Pt c/o abdominal pain, PRN pain med given. No c/o nausea. Pt on room air. Midline dressing intact with small amount of serosanguineous drainage. Lap sites c/d/i. IVF and IV abx infusing. Positive sepsis screen. Pt disoriented to place at times. SCDs on. Continuous pulse ox in use. VSS. Abdominal binder in place. Incentive spirometer encouraged. Turn Q2.

## 2023-03-07 NOTE — PLAN OF CARE
Goal Outcome Evaluation:      Continuing maintenance IV fluids. 1 dose of Morphine given IV for pain control today. Monitoring labs. Physical therapy consulted today. Needs encouragement with activity. NG and F/C remains in place. Family at bedside.

## 2023-03-08 LAB
ANION GAP SERPL CALCULATED.3IONS-SCNC: 9 MMOL/L (ref 5–15)
BUN SERPL-MCNC: 21 MG/DL (ref 8–23)
BUN/CREAT SERPL: 38.9 (ref 7–25)
CALCIUM SPEC-SCNC: 8.7 MG/DL (ref 8.2–9.6)
CHLORIDE SERPL-SCNC: 105 MMOL/L (ref 98–107)
CO2 SERPL-SCNC: 27 MMOL/L (ref 22–29)
CREAT SERPL-MCNC: 0.54 MG/DL (ref 0.57–1)
CYTO UR: NORMAL
DEPRECATED RDW RBC AUTO: 49.3 FL (ref 37–54)
EGFRCR SERPLBLD CKD-EPI 2021: 86 ML/MIN/1.73
ERYTHROCYTE [DISTWIDTH] IN BLOOD BY AUTOMATED COUNT: 14.6 % (ref 12.3–15.4)
GLUCOSE SERPL-MCNC: 116 MG/DL (ref 65–99)
HCT VFR BLD AUTO: 33.9 % (ref 34–46.6)
HGB BLD-MCNC: 10.6 G/DL (ref 12–15.9)
LAB AP CASE REPORT: NORMAL
Lab: NORMAL
MCH RBC QN AUTO: 28.6 PG (ref 26.6–33)
MCHC RBC AUTO-ENTMCNC: 31.3 G/DL (ref 31.5–35.7)
MCV RBC AUTO: 91.4 FL (ref 79–97)
PATH REPORT.FINAL DX SPEC: NORMAL
PATH REPORT.GROSS SPEC: NORMAL
PLATELET # BLD AUTO: 271 10*3/MM3 (ref 140–450)
PMV BLD AUTO: 9.8 FL (ref 6–12)
POTASSIUM SERPL-SCNC: 3.6 MMOL/L (ref 3.5–5.2)
RBC # BLD AUTO: 3.71 10*6/MM3 (ref 3.77–5.28)
SODIUM SERPL-SCNC: 141 MMOL/L (ref 136–145)
WBC NRBC COR # BLD: 10.55 10*3/MM3 (ref 3.4–10.8)

## 2023-03-08 PROCEDURE — 85027 COMPLETE CBC AUTOMATED: CPT | Performed by: SPECIALIST

## 2023-03-08 PROCEDURE — 80048 BASIC METABOLIC PNL TOTAL CA: CPT | Performed by: SPECIALIST

## 2023-03-08 PROCEDURE — 97162 PT EVAL MOD COMPLEX 30 MIN: CPT | Performed by: PHYSICAL THERAPIST

## 2023-03-08 PROCEDURE — 97530 THERAPEUTIC ACTIVITIES: CPT | Performed by: PHYSICAL THERAPIST

## 2023-03-08 PROCEDURE — 99024 POSTOP FOLLOW-UP VISIT: CPT | Performed by: SPECIALIST

## 2023-03-08 PROCEDURE — 25010000002 MORPHINE PER 10 MG: Performed by: SPECIALIST

## 2023-03-08 PROCEDURE — 25010000002 ENOXAPARIN PER 10 MG: Performed by: SPECIALIST

## 2023-03-08 RX ADMIN — SODIUM CHLORIDE, POTASSIUM CHLORIDE, SODIUM LACTATE AND CALCIUM CHLORIDE 125 ML/HR: 600; 310; 30; 20 INJECTION, SOLUTION INTRAVENOUS at 02:32

## 2023-03-08 RX ADMIN — METOPROLOL TARTRATE 5 MG: 5 INJECTION INTRAVENOUS at 14:46

## 2023-03-08 RX ADMIN — SODIUM CHLORIDE, POTASSIUM CHLORIDE, SODIUM LACTATE AND CALCIUM CHLORIDE 125 ML/HR: 600; 310; 30; 20 INJECTION, SOLUTION INTRAVENOUS at 18:44

## 2023-03-08 RX ADMIN — MORPHINE SULFATE 4 MG: 4 INJECTION, SOLUTION INTRAMUSCULAR; INTRAVENOUS at 15:34

## 2023-03-08 RX ADMIN — METOPROLOL TARTRATE 5 MG: 5 INJECTION INTRAVENOUS at 00:56

## 2023-03-08 RX ADMIN — FAMOTIDINE 20 MG: 10 INJECTION INTRAVENOUS at 08:22

## 2023-03-08 RX ADMIN — PREGABALIN 100 MG: 100 CAPSULE ORAL at 15:34

## 2023-03-08 RX ADMIN — METOPROLOL TARTRATE 5 MG: 5 INJECTION INTRAVENOUS at 08:23

## 2023-03-08 RX ADMIN — SODIUM CHLORIDE, POTASSIUM CHLORIDE, SODIUM LACTATE AND CALCIUM CHLORIDE 125 ML/HR: 600; 310; 30; 20 INJECTION, SOLUTION INTRAVENOUS at 10:31

## 2023-03-08 RX ADMIN — MORPHINE SULFATE 4 MG: 4 INJECTION, SOLUTION INTRAMUSCULAR; INTRAVENOUS at 11:31

## 2023-03-08 RX ADMIN — DONEPEZIL HYDROCHLORIDE 10 MG: 10 TABLET ORAL at 20:00

## 2023-03-08 RX ADMIN — METOPROLOL TARTRATE 5 MG: 5 INJECTION INTRAVENOUS at 19:54

## 2023-03-08 RX ADMIN — MORPHINE SULFATE 4 MG: 4 INJECTION, SOLUTION INTRAMUSCULAR; INTRAVENOUS at 07:28

## 2023-03-08 RX ADMIN — PREGABALIN 100 MG: 100 CAPSULE ORAL at 20:00

## 2023-03-08 RX ADMIN — TRAZODONE HYDROCHLORIDE 50 MG: 50 TABLET ORAL at 20:00

## 2023-03-08 RX ADMIN — ENOXAPARIN SODIUM 30 MG: 100 INJECTION SUBCUTANEOUS at 08:22

## 2023-03-08 RX ADMIN — MORPHINE SULFATE 4 MG: 4 INJECTION, SOLUTION INTRAMUSCULAR; INTRAVENOUS at 19:54

## 2023-03-08 RX ADMIN — MORPHINE SULFATE 4 MG: 4 INJECTION, SOLUTION INTRAMUSCULAR; INTRAVENOUS at 01:00

## 2023-03-08 RX ADMIN — Medication 1 SPRAY: at 11:32

## 2023-03-08 NOTE — THERAPY EVALUATION
Patient Name: Hannah Dasilva  : 1929    MRN: [id number]                              Today's Date: 3/8/2023       Admit Date: 3/5/2023    Visit Dx:     ICD-10-CM ICD-9-CM   1. Small bowel obstruction (HCC)  K56.609 560.9   2. Abdominal pain, acute, right lower quadrant  R10.31 789.03     338.19   3. Decreased stooling  R19.4 787.99   4. History of cholecystectomy  Z90.49 V45.79   5. Partial small bowel obstruction (HCC)  K56.600 560.9     Patient Active Problem List   Diagnosis   • Diarrhea in adult patient   • Nonsmoker   • Rectal bleeding   • Controlled type 2 diabetes mellitus without complication, without long-term current use of insulin (HCC)   • HTN (hypertension), benign   • Vasovagal syncope   • LAFB (left anterior fascicular block)   • RBBB   • Trifascicular block   • Dyspepsia   • Right upper quadrant abdominal pain   • Irritable bowel syndrome with diarrhea   • Acute pancreatitis   • Abnormal CT scan, gallbladder   • Dementia with behavioral disturbance   • Transaminitis   • Small bowel obstruction (HCC)     Past Medical History:   Diagnosis Date   • Diabetes mellitus (HCC)    • Disc degeneration, lumbar    • Diverticulosis    • Family history of colonic polyps    • History of adenomatous polyp of colon    • Hypercholesterolemia    • Irregular heartbeat    • LAFB (left anterior fascicular block) 10/26/2018   • MVP (mitral valve prolapse)    • Osteoarthritis    • Sciatica      Past Surgical History:   Procedure Laterality Date   • APPENDECTOMY     • BLADDER REPAIR     • CHOLECYSTECTOMY WITH INTRAOPERATIVE CHOLANGIOGRAM N/A 3/31/2020    Procedure: CHOLECYSTECTOMY LAPAROSCOPIC INTRAOPERATIVE CHOLANGIOGRAM;  Surgeon: Jenna Kinney MD;  Location: [facility];  Service: General;  Laterality: N/A;   • COLONOSCOPY  2010    diverticulosis   • COLONOSCOPY W/ POLYPECTOMY  2006    ASCENDING COLON ADENOMATOUS POLYP   • DIAGNOSTIC LAPAROSCOPY N/A 3/6/2023    Procedure: DIAGNOSTIC LAPAROSCOPY;   Surgeon: Brittaney Rios MD;  Location:  [facility];  Service: General;  Laterality: N/A;   • ENDOSCOPY  10/04/2006    SBBX UNREMARKABLE, SMALL HH, UREA NEG   • EXPLORATORY LAPAROTOMY N/A 3/6/2023    Procedure: LAPAROTOMY EXPLORATORY SMALL BOWEL RESECTION;  Surgeon: Brittaney Rios MD;  Location:  [facility];  Service: General;  Laterality: N/A;   • HYSTERECTOMY     • TONSILLECTOMY        General Information     Row Name 03/08/23 1050          Physical Therapy Time and Intention    Document Type evaluation  Pt presented w/ periumbilical and R lower quadrant pain w/ nausea 3/5. Dx: Small bowel obstruction. Diagnostic laparoscopy, lysis of adhesions, small bowel resection 3/6. PMH: Diabetes, Diverticulosis, Hypercholesterolemia, OA, Sciatica.  -SB (r) AA (t) SB (c)     Mode of Treatment physical therapy  -SB (r) AA (t) SB (c)     Row Name 03/08/23 1050          General Information    Patient Profile Reviewed yes  -SB (r) AA (t) SB (c)     Prior Level of Function independent:;all household mobility;community mobility;gait;transfer;ADL's;cooking;cleaning;dependent:;driving  Pt used a cane occassionally if she got tired.  -SB (r) AA (t) SB (c)     Existing Precautions/Restrictions fall  NG tube  -SB (r) AA (t) SB (c)     Barriers to Rehab medically complex  -SB (r) AA (t) SB (c)     Row Name 03/08/23 1050          Living Environment    People in Home alone  -SB (r) AA (t) SB (c)     Row Name 03/08/23 1050          Home Main Entrance    Number of Stairs, Main Entrance three  -SB (r) AA (t) SB (c)     Stair Railings, Main Entrance railing on left side (ascending)  -SB (r) AA (t) SB (c)     Row Name 03/08/23 1050          Stairs Within Home, Primary    Number of Stairs, Within Home, Primary none  -SB (r) AA (t) SB (c)     Row Name 03/08/23 1050          Cognition    Orientation Status (Cognition) oriented x 4  -SB (r) AA (t) SB (c)     Row Name 03/08/23 1050          Safety Issues, Functional Mobility    Safety Issues  Affecting Function (Mobility) friction/shear risk  -SB (r) AA (t) SB (c)     Impairments Affecting Function (Mobility) endurance/activity tolerance;strength;pain;balance  -SB (r) AA (t) SB (c)           User Key  (r) = Recorded By, (t) = Taken By, (c) = Cosigned By    Initials Name Provider Type    Elaina Guevara, PT DPT Physical Therapist    Purvi Baker, PT Student PT Student               Mobility     Row Name 03/08/23 1050          Bed Mobility    Bed Mobility supine-sit  -SB (r) AA (t) SB (c)     Supine-Sit [place] (Bed Mobility) moderate assist (50% patient effort)  -SB (r) AA (t) SB (c)     Assistive Device (Bed Mobility) bed rails;head of bed elevated;draw sheet  -SB (r) AA (t) SB (c)     Row Name 03/08/23 1050          Sit-Stand Transfer    Sit-Stand [place] (Transfers) moderate assist (50% patient effort);verbal cues  -SB (r) AA (t) SB (c)     Row Name 03/08/23 1050          Gait/Stairs (Locomotion)    [place] Level (Gait) contact guard;verbal cues  -SB (r) AA (t) SB (c)     Distance in Feet (Gait) Pt took side steps from the bed to the chair.  -SB (r) AA (t) SB (c)     Deviations/Abnormal Patterns (Gait) festinating/shuffling;gait speed decreased  -SB (r) AA (t) SB (c)           User Key  (r) = Recorded By, (t) = Taken By, (c) = Cosigned By    Initials Name Provider Type    Elaina Guevara, PT DPT Physical Therapist    Purvi Baker, PT Student PT Student               Obj/Interventions     Row Name 03/08/23 1050          Range of Motion Comprehensive    General Range of Motion bilateral lower extremity ROM WFL  -SB (r) AA (t) SB (c)     Row Name 03/08/23 1050          Strength Comprehensive (MMT)    General Manual Muscle Testing (MMT) Assessment lower extremity strength deficits identified  -SB (r) AA (t) SB (c)     Comment, General Manual Muscle Testing (MMT) Assessment L LE 3+/5, except L foot, which was 4-/5. R LE 4-/5  -SB (r) AA (t) SB (c)     Row Name 03/08/23 1055           Balance    Balance Assessment sitting static balance;sitting dynamic balance;standing static balance  -SB (r) AA (t) SB (c)     Static Sitting Balance standby assist  -SB (r) AA (t) SB (c)     Dynamic Sitting Balance contact guard  -SB (r) AA (t) SB (c)     Position, Sitting Balance unsupported;sitting edge of bed  -SB (r) AA (t) SB (c)     Static Standing Balance contact guard  -SB (r) AA (t) SB (c)     Position/Device Used, Standing Balance unsupported  -SB (r) AA (t) SB (c)     Row Name 03/08/23 1050          Sensory Assessment (Somatosensory)    Sensory Assessment (Somatosensory) LE sensation intact  -SB (r) AA (t) SB (c)           User Key  (r) = Recorded By, (t) = Taken By, (c) = Cosigned By    Initials Name Provider Type    SB Elaina Mckinney, PT DPT Physical Therapist    Purvi Baker, PT Student PT Student               Goals/Plan     Row Name 03/08/23 1050          Bed Mobility Goal 1 (PT)    Activity/Assistive Device (Bed Mobility Goal 1, PT) sit to supine/supine to sit  -SB (r) AA (t) SB (c)     [place] Level/Cues Needed (Bed Mobility Goal 1, PT) contact guard required  -SB (r) AA (t) SB (c)     Time Frame (Bed Mobility Goal 1, PT) long term goal (LTG)  -SB (r) AA (t) SB (c)     Progress/Outcomes (Bed Mobility Goal 1, PT) new goal  -SB (r) AA (t) SB (c)     Row Name 03/08/23 1050          Transfer Goal 1 (PT)    Activity/Assistive Device (Transfer Goal 1, PT) sit-to-stand/stand-to-sit;bed-to-chair/chair-to-bed;walker, rolling  -SB (r) AA (t) SB (c)     [place] Level/Cues Needed (Transfer Goal 1, PT) contact guard required  -SB (r) AA (t) SB (c)     Time Frame (Transfer Goal 1, PT) long term goal (LTG)  -SB (r) AA (t) SB (c)     Progress/Outcome (Transfer Goal 1, PT) new goal  -SB (r) AA (t) SB (c)     Row Name 03/08/23 1058          Gait Training Goal 1 (PT)    Activity/Assistive Device (Gait Training Goal 1, PT) gait (walking locomotion);improve balance and speed;increase  endurance/gait distance;walker, rolling  -SB (r) AA (t) SB (c)     [place] Level (Gait Training Goal 1, PT) contact guard required  -SB (r) AA (t) SB (c)     Distance (Gait Training Goal 1, PT) 50 ft  -SB (r) AA (t) SB (c)     Time Frame (Gait Training Goal 1, PT) long term goal (LTG)  -SB (r) AA (t) SB (c)     Progress/Outcome (Gait Training Goal 1, PT) new goal  -SB (r) AA (t) SB (c)     Row Name 03/08/23 1050          Stairs Goal 1 (PT)    Activity/Assistive Device (Stairs Goal 1, PT) ascending stairs;descending stairs;using handrail, left  -SB (r) AA (t) SB (c)     [place] Level/Cues Needed (Stairs Goal 1, PT) contact guard required  -SB (r) AA (t) SB (c)     Number of Stairs (Stairs Goal 1, PT) 3  -SB (r) AA (t) SB (c)     Time Frame (Stairs Goal 1, PT) long term goal (LTG)  -SB (r) AA (t) SB (c)     Progress/Outcome (Stairs Goal 1, PT) new goal  -SB (r) AA (t) SB (c)     Row Name 03/08/23 1050          Therapy Assessment/Plan (PT)    Planned Therapy Interventions (PT) balance training;bed mobility training;gait training;patient/family education;stair training;strengthening;transfer training  -SB (r) AA (t) SB (c)           User Key  (r) = Recorded By, (t) = Taken By, (c) = Cosigned By    Initials Name Provider Type    Elaina Guevara, PT DPT Physical Therapist    Purvi Baker, PT Student PT Student               Clinical Impression     Row Name 03/08/23 1050          Pain    Pretreatment Pain Rating 8/10  -SB (r) AA (t) SB (c)     Pain Location - abdomen  -SB (r) AA (t) SB (c)     Pain Intervention(s) Medication (See MAR);Repositioned;Ambulation/increased activity  -SB (r) AA (t) SB (c)     Additional Documentation Pain Scale: Numbers Pre/Post-Treatment (Group);Pain Scale: FACES Pre/Post-Treatment (Group)  -SB (r) AA (t) SB (c)     Row Name 03/08/23 1050          Pain Scale: FACES Pre/Post-Treatment    Posttreatment Pain Rating 6-->hurts even more  -SB (r) AA (t) SB (c)     Row Name 03/08/23  1050          Plan of Care Review    Plan of Care Reviewed With patient  -SB (r) AA (t) SB (c)     Progress no change  -SB (r) AA (t) SB (c)     Outcome Evaluation PT eval completed. Pt oriented x4 with complaints of abdominal pain. Pt performed supine to sit with mod A and use of bed rails and HOB elevated. Pt reported feeling slightly light headed upon sitting EOB. Her L LE was slightly weaker than her R LE, but pt reported that was normal for her. Pt required mod A and verbal cues to stand and maintain upright posture. She took side steps towards the chair with CGA. Pt would benefit from use of a FWW during transfers and gait. Skilled PT is necessary in order to improve balance, strength, bed mobility, transfers, and gait. Recommend d/c home with 24/7 assist and HH versus SNF pending family decisions.  -SB (r) AA (t) SB (c)     Row Name 03/08/23 1050          Therapy Assessment/Plan (PT)    Patient/Family Therapy Goals Statement (PT) Pt did not state.  -SB (r) AA (t) SB (c)     Rehab Potential (PT) good, to achieve stated therapy goals  -SB (r) AA (t) SB (c)     Criteria for Skilled Interventions Met (PT) yes;meets criteria;skilled treatment is necessary  -SB (r) AA (t) SB (c)     Therapy Frequency (PT) 2 times/day  -SB (r) AA (t) SB (c)     Predicted Duration of Therapy Intervention (PT) until d/c or goals met  -SB (r) AA (t) SB (c)     Row Name 03/08/23 1050          Vital Signs    Pre SpO2 (%) 94  -SB (r) AA (t) SB (c)     O2 Delivery Pre Treatment room air  -SB (r) AA (t) SB (c)     O2 Delivery Intra Treatment room air  -SB (r) AA (t) SB (c)     Post SpO2 (%) 92  -SB (r) AA (t) SB (c)     O2 Delivery Post Treatment room air  -SB (r) AA (t) SB (c)     Pre Patient Position Supine  -SB (r) AA (t) SB (c)     Intra Patient Position Standing  -SB (r) AA (t) SB (c)     Post Patient Position Sitting  -SB (r) AA (t) SB (c)     Row Name 03/08/23 1050          Positioning and Restraints    Pre-Treatment Position in bed   -SB (r) AA (t) SB (c)     Post Treatment Position chair  -SB (r) AA (t) SB (c)     In Chair notified nsg;reclined;call light within reach;encouraged to call for assist;with family/caregiver;with nsg  -SB (r) AA (t) SB (c)           User Key  (r) = Recorded By, (t) = Taken By, (c) = Cosigned By    Initials Name Provider Type    Elaina Guevara, PT DPT Physical Therapist    Purvi Baker, PT Student PT Student               Outcome Measures     Row Name 03/08/23 1050 03/08/23 0728       How much help from another person do you currently need...    Turning from your back to your side while in flat bed without using bedrails? 3  -SB (r) AA (t) SB (c) 2  -CM    Moving from lying on back to sitting on the side of a flat bed without bedrails? 3  -SB (r) AA (t) SB (c) 2  -CM    Moving to and from a bed to a chair (including a wheelchair)? 3  -SB (r) AA (t) SB (c) 2  -CM    Standing up from a chair using your arms (e.g., wheelchair, bedside chair)? 3  -SB (r) AA (t) SB (c) 2  -CM    Climbing 3-5 steps with a railing? 2  -SB (r) AA (t) SB (c) 1  -CM    To walk in hospital room? 3  -SB (r) AA (t) SB (c) 1  -CM    AM-PAC 6 Clicks Score (PT) 17  -SB (r) AA (t) 10  -CM    Highest level of mobility 5 --> Static standing  -SB (r) AA (t) 4 --> Transferred to chair/commode  -CM    Row Name 03/08/23 1050          Functional Assessment    Outcome Measure Options AM-PAC 6 Clicks Basic Mobility (PT)  -SB (r) AA (t) SB (c)           User Key  (r) = Recorded By, (t) = Taken By, (c) = Cosigned By    Initials Name Provider Type    Elaina Guevara, PT DPT Physical Therapist    Elizabeth Ruiz, RN Registered Nurse    Purvi Baker, PT Student PT Student                             Physical Therapy Education     Title: PT OT SLP Therapies (In Progress)     Topic: Physical Therapy (In Progress)     Point: Mobility training (Done)     Learning Progress Summary           Patient Acceptance, E, VU by AA at 3/8/2023 2315     Comment: Pt was educated on POC, benefits of activity, and safety with transfers.                   Point: Home exercise program (Not Started)     Learner Progress:  Not documented in this visit.          Point: Body mechanics (Not Started)     Learner Progress:  Not documented in this visit.          Point: Precautions (Not Started)     Learner Progress:  Not documented in this visit.                      User Key     Initials Effective Dates Name Provider Type Discipline     01/03/23 -  Purvi Deluna, PT Student PT Student PT              PT Recommendation and Plan  Planned Therapy Interventions (PT): balance training, bed mobility training, gait training, patient/family education, stair training, strengthening, transfer training  Plan of Care Reviewed With: patient  Progress: no change  Outcome Evaluation: PT eval completed. Pt oriented x4 with complaints of abdominal pain. Pt performed supine to sit with mod A and use of bed rails and HOB elevated. Pt reported feeling slightly light headed upon sitting EOB. Her L LE was slightly weaker than her R LE, but pt reported that was normal for her. Pt required mod A and verbal cues to stand and maintain upright posture. She took side steps towards the chair with CGA. Pt would benefit from use of a FWW during transfers and gait. Skilled PT is necessary in order to improve balance, strength, bed mobility, transfers, and gait. Recommend d/c home with 24/7 assist and HH versus SNF pending family decisions.     Time Calculation:    PT Charges     Row Name 03/08/23 1208             Time Calculation    Start Time 1050  -SB (r) AA (t) SB (c)      Stop Time 1159  -SB (r) AA (t) SB (c)      Time Calculation (min) 69 min  -SB (r) AA (t)      PT Received On 03/08/23  -SB (r) AA (t) SB (c)      PT Goal Re-Cert Due Date 03/18/23  -SB (r) AA (t) SB (c)         Time Calculation- PT    Total Timed Code Minutes- PT 10 minute(s)  -SB (r) AA (t) SB (c)         Timed Charges    66060 -  PT Therapeutic Activity Minutes 10  -SB (r) AA (t) SB (c)         Total Minutes    Timed Charges Total Minutes 10  -SB (r) AA (t)       Total Minutes 10  -SB (r) AA (t)            User Key  (r) = Recorded By, (t) = Taken By, (c) = Cosigned By    Initials Name Provider Type    Elaina Guevara, PT DPT Physical Therapist    Purvi Baker, PT Student PT Student                  PT G-Codes  Outcome Measure Options: AM-PAC 6 Clicks Basic Mobility (PT)  AM-PAC 6 Clicks Score (PT): 17  PT Discharge Summary  Anticipated Discharge Disposition (PT): home with home health, home with 24/7 care, skilled nursing facility    Purvi Deluna, PT Student  3/8/2023

## 2023-03-08 NOTE — PLAN OF CARE
Goal Outcome Evaluation:      Physical therapy in today to work with patient. Sat up in the chair for an hour. Very weak. Needs a lot of encouragement with activity. F/C D/C'd. DTV by 1730 tonight. Continuing maintenance IV fluids and NG tube. Cardiac monitor in place. Family at bedside.

## 2023-03-08 NOTE — PLAN OF CARE
Goal Outcome Evaluation:  Plan of Care Reviewed With: patient           Outcome Evaluation: Pt c/o abdominal pain. No c/o nausea. Turn Q2, refusing at times. NG tube c/d/i to low intermittent wall suction. Continuous pulse ox in use. Pt A&O x4, confused at times. Midline dressing dry and intact with dried serosanguineous drainage. Lap sites c/d/i. Cruz c/d/i with clear yellow urine out. SCDs on. VSS. Incentive spirometer encouraged.

## 2023-03-08 NOTE — PROGRESS NOTES
Brittaney Rios MD FACS  Progress Note     LOS: 3 days   Patient Care Team:  Aga Mcdermott DO as PCP - General  Aga Mcdermott DO as PCP - Family Medicine  Ulysses Ruiz DMD as Referring Physician (Dental General Practice)  Js Alaniz MD as Referring Physician (Cardiology)  Mo Jasso MD as Cardiologist (Cardiology)  Rony Lai MD as Consulting Physician (Gastroenterology)      Subjective     Chief complaint: POD#2    History of Present Illness:   Not wanting to get out of bed.  Complains of sore throat       The following portions of the patient's history were reviewed and updated as appropriate: allergies, current medications, past family history, past medical history, past social history, past surgical history, and problem list.    Objective     Physical Exam:    Vital Signs:  Temp:  [97.8 °F (36.6 °C)-99.1 °F (37.3 °C)] 98.8 °F (37.1 °C)  Heart Rate:  [76-90] 86  Resp:  [16] 16  BP: (127-164)/(54-78) 164/73      Constitutional:    Well-developed, well-nourished in no acute distress  Eyes:     Extraocular movements intact; pupils equal, round, and reactive  Ears, Nose, Mouth, Throat:  Hearing intact, nose midline, no mucosal lesions  Cardiovascular:   Regular rate and rhythm   Respiratory:    Clear to auscultation bilaterally  Gastrointestinal:   Soft, distended, wound clean, appropriately tender  Genitourinary:    Deferred  Musculoskeletal:   Full range of motion, no muscle wasting, no weakness  Skin:     No rashes or excoriations  Neurological:    Moves all extremities, sensation intact  Psychiatric:    Alert and oriented to person, place, and time  Hematologic/Lymphatic/Immune: No lymphadenopathy      Results Review:    Lab Results (last 24 hours)     Procedure Component Value Units Date/Time    Basic Metabolic Panel [[id number]]  (Abnormal) Collected: 03/08/23 0439    Specimen: Blood Updated: 03/08/23 0553     Glucose 116 mg/dL      BUN 21 mg/dL      Creatinine  0.54 mg/dL      Sodium 141 mmol/L      Potassium 3.6 mmol/L      Chloride 105 mmol/L      CO2 27.0 mmol/L      Calcium 8.7 mg/dL      BUN/Creatinine Ratio 38.9     Anion Gap 9.0 mmol/L      eGFR 86.0 mL/min/1.73     Narrative:      GFR Normal >60  Chronic Kidney Disease <60  Kidney Failure <15    The GFR formula is only valid for adults with stable renal function between ages 18 and 70.    CBC (No Diff) [[id number]]  (Abnormal) Collected: 03/08/23 0439    Specimen: Blood Updated: 03/08/23 0506     WBC 10.55 10*3/mm3      RBC 3.71 10*6/mm3      Hemoglobin 10.6 g/dL      Hematocrit 33.9 %      MCV 91.4 fL      MCH 28.6 pg      MCHC 31.3 g/dL      RDW 14.6 %      RDW-SD 49.3 fl      MPV 9.8 fL      Platelets 271 10*3/mm3     Tissue Pathology Exam [[id number]] Collected: 03/06/23 1517    Specimen: Tissue from Small Intestine Updated: 03/07/23 0919        Imaging Results (Last 24 Hours)     ** No results found for the last 24 hours. **            Assessment & Plan     POD#2 BRANDI RAMSEY rsxn    Needs to continue aggressive pulmonary toilet and needs to increase activity      Brittaney Rios MD  03/08/23  09:08 CST

## 2023-03-08 NOTE — PLAN OF CARE
Goal Outcome Evaluation:  Plan of Care Reviewed With: (P) patient        Progress: (P) no change  Outcome Evaluation: (P) PT eval completed. Pt oriented x4 with complaints of abdominal pain. Pt performed supine to sit with mod A and use of bed rails and HOB elevated. Pt reported feeling slightly light headed upon sitting EOB. Her L LE was slightly weaker than her R LE, but pt reported that was normal for her. Pt required mod A and verbal cues to stand and maintain upright posture. She took side steps towards the chair with CGA. Pt would benefit from use of a FWW during transfers and gait. Skilled PT is necessary in order to improve balance, strength, bed mobility, transfers, and gait. Recommend d/c home with 24/7 assist and HH versus SNF pending family decisions.

## 2023-03-09 LAB
ANION GAP SERPL CALCULATED.3IONS-SCNC: 10 MMOL/L (ref 5–15)
BUN SERPL-MCNC: 19 MG/DL (ref 8–23)
BUN/CREAT SERPL: 40.4 (ref 7–25)
CALCIUM SPEC-SCNC: 8.4 MG/DL (ref 8.2–9.6)
CHLORIDE SERPL-SCNC: 104 MMOL/L (ref 98–107)
CO2 SERPL-SCNC: 28 MMOL/L (ref 22–29)
CREAT SERPL-MCNC: 0.47 MG/DL (ref 0.57–1)
DEPRECATED RDW RBC AUTO: 48.4 FL (ref 37–54)
EGFRCR SERPLBLD CKD-EPI 2021: 88.9 ML/MIN/1.73
ERYTHROCYTE [DISTWIDTH] IN BLOOD BY AUTOMATED COUNT: 14.3 % (ref 12.3–15.4)
GLUCOSE SERPL-MCNC: 110 MG/DL (ref 65–99)
HCT VFR BLD AUTO: 32.3 % (ref 34–46.6)
HGB BLD-MCNC: 10.1 G/DL (ref 12–15.9)
MCH RBC QN AUTO: 28.4 PG (ref 26.6–33)
MCHC RBC AUTO-ENTMCNC: 31.3 G/DL (ref 31.5–35.7)
MCV RBC AUTO: 90.7 FL (ref 79–97)
PLATELET # BLD AUTO: 269 10*3/MM3 (ref 140–450)
PMV BLD AUTO: 9.4 FL (ref 6–12)
POTASSIUM SERPL-SCNC: 3.3 MMOL/L (ref 3.5–5.2)
RBC # BLD AUTO: 3.56 10*6/MM3 (ref 3.77–5.28)
SODIUM SERPL-SCNC: 142 MMOL/L (ref 136–145)
WBC NRBC COR # BLD: 8.98 10*3/MM3 (ref 3.4–10.8)

## 2023-03-09 PROCEDURE — 97116 GAIT TRAINING THERAPY: CPT

## 2023-03-09 PROCEDURE — 97530 THERAPEUTIC ACTIVITIES: CPT

## 2023-03-09 PROCEDURE — 99024 POSTOP FOLLOW-UP VISIT: CPT | Performed by: SPECIALIST

## 2023-03-09 PROCEDURE — 25010000002 POTASSIUM CHLORIDE PER 2 MEQ: Performed by: SPECIALIST

## 2023-03-09 PROCEDURE — 25010000002 MORPHINE PER 10 MG: Performed by: SPECIALIST

## 2023-03-09 PROCEDURE — 80048 BASIC METABOLIC PNL TOTAL CA: CPT | Performed by: SPECIALIST

## 2023-03-09 PROCEDURE — 85027 COMPLETE CBC AUTOMATED: CPT | Performed by: SPECIALIST

## 2023-03-09 PROCEDURE — 25010000002 ENOXAPARIN PER 10 MG: Performed by: SPECIALIST

## 2023-03-09 RX ORDER — POTASSIUM CHLORIDE 14.9 MG/ML
20 INJECTION INTRAVENOUS
Status: COMPLETED | OUTPATIENT
Start: 2023-03-09 | End: 2023-03-09

## 2023-03-09 RX ORDER — DEXTROSE, SODIUM CHLORIDE, AND POTASSIUM CHLORIDE 5; .45; .075 G/100ML; G/100ML; G/100ML
75 INJECTION INTRAVENOUS CONTINUOUS
Status: DISCONTINUED | OUTPATIENT
Start: 2023-03-09 | End: 2023-03-12

## 2023-03-09 RX ADMIN — METOPROLOL TARTRATE 5 MG: 5 INJECTION INTRAVENOUS at 13:23

## 2023-03-09 RX ADMIN — MORPHINE SULFATE 4 MG: 4 INJECTION, SOLUTION INTRAMUSCULAR; INTRAVENOUS at 12:39

## 2023-03-09 RX ADMIN — PREGABALIN 100 MG: 100 CAPSULE ORAL at 09:07

## 2023-03-09 RX ADMIN — POTASSIUM CHLORIDE 20 MEQ: 14.9 INJECTION, SOLUTION INTRAVENOUS at 09:06

## 2023-03-09 RX ADMIN — DEXTROSE MONOHYDRATE, SODIUM CHLORIDE, AND POTASSIUM CHLORIDE 75 ML/HR: 50; 4.5; .745 INJECTION, SOLUTION INTRAVENOUS at 21:26

## 2023-03-09 RX ADMIN — METOPROLOL TARTRATE 5 MG: 5 INJECTION INTRAVENOUS at 06:51

## 2023-03-09 RX ADMIN — DONEPEZIL HYDROCHLORIDE 10 MG: 10 TABLET ORAL at 21:26

## 2023-03-09 RX ADMIN — METOPROLOL TARTRATE 5 MG: 5 INJECTION INTRAVENOUS at 21:27

## 2023-03-09 RX ADMIN — DEXTROSE MONOHYDRATE, SODIUM CHLORIDE, AND POTASSIUM CHLORIDE 75 ML/HR: 50; 4.5; .745 INJECTION, SOLUTION INTRAVENOUS at 06:51

## 2023-03-09 RX ADMIN — MORPHINE SULFATE 4 MG: 4 INJECTION, SOLUTION INTRAMUSCULAR; INTRAVENOUS at 04:54

## 2023-03-09 RX ADMIN — MORPHINE SULFATE 4 MG: 4 INJECTION, SOLUTION INTRAMUSCULAR; INTRAVENOUS at 17:18

## 2023-03-09 RX ADMIN — POTASSIUM CHLORIDE 20 MEQ: 14.9 INJECTION, SOLUTION INTRAVENOUS at 06:51

## 2023-03-09 RX ADMIN — TRAZODONE HYDROCHLORIDE 50 MG: 50 TABLET ORAL at 21:26

## 2023-03-09 RX ADMIN — FAMOTIDINE 20 MG: 10 INJECTION INTRAVENOUS at 09:07

## 2023-03-09 RX ADMIN — PREGABALIN 100 MG: 100 CAPSULE ORAL at 21:27

## 2023-03-09 RX ADMIN — ENOXAPARIN SODIUM 30 MG: 100 INJECTION SUBCUTANEOUS at 09:06

## 2023-03-09 RX ADMIN — MORPHINE SULFATE 4 MG: 4 INJECTION, SOLUTION INTRAMUSCULAR; INTRAVENOUS at 21:27

## 2023-03-09 NOTE — PLAN OF CARE
Goal Outcome Evaluation:  Plan of Care Reviewed With: patient        Progress: improving  Outcome Evaluation: PT tx completed. PT requires min x1 for supine to sit. Stands with min x1 and able to t/f to BSC then to chair with CGA and HHA. After seated rest break pt able to amb forward/backward 5' x2 reps. Pt fatigues quickly and requries rest breaks. Cues for RWX safety and posture. Encouraged pt to sit up in chair. Will follow.

## 2023-03-09 NOTE — PROGRESS NOTES
Brittaney Rios MD FACS  Progress Note     LOS: 4 days   Patient Care Team:  Aga Mcedrmott DO as PCP - General  Aga Mcdermott DO as PCP - Family Medicine  Ulysses Ruiz DMD as Referring Physician (Dental General Practice)  Js Alaniz MD as Referring Physician (Cardiology)  Mo Jasso MD as Cardiologist (Cardiology)  Rony Lai MD as Consulting Physician (Gastroenterology)      Subjective     Chief complaint: POD#3    History of Present Illness:    pain controlled.  Denies nausea.  Small flatus       The following portions of the patient's history were reviewed and updated as appropriate: allergies, current medications, past family history, past medical history, past social history, past surgical history, and problem list.    Objective     Physical Exam:    Vital Signs:  Temp:  [97.7 °F (36.5 °C)-98.6 °F (37 °C)] 97.7 °F (36.5 °C)  Heart Rate:  [72-88] 75  Resp:  [16-18] 18  BP: (157-190)/(62-81) 176/72      Constitutional:    Well-developed, well-nourished in no acute distress  Eyes:     Extraocular movements intact; pupils equal, round, and reactive  Ears, Nose, Mouth, Throat:  Hearing intact, nose midline, no mucosal lesions  Cardiovascular:   Regular rate and rhythm   Respiratory:    Clear to auscultation bilaterally  Gastrointestinal:   Soft, appropriately tender, clean, still distended  Genitourinary:    Deferred  Musculoskeletal:   Full range of motion, no muscle wasting, no weakness  Skin:     No rashes or excoriations  Neurological:    Moves all extremities, sensation intact  Psychiatric:    Alert and oriented to person, place, and time  Hematologic/Lymphatic/Immune: No lymphadenopathy      Results Review:    Lab Results (last 24 hours)     Procedure Component Value Units Date/Time    Basic Metabolic Panel [[id number]]  (Abnormal) Collected: 03/09/23 0439    Specimen: Blood Updated: 03/09/23 0517     Glucose 110 mg/dL      BUN 19 mg/dL      Creatinine 0.47 mg/dL       Sodium 142 mmol/L      Potassium 3.3 mmol/L      Chloride 104 mmol/L      CO2 28.0 mmol/L      Calcium 8.4 mg/dL      BUN/Creatinine Ratio 40.4     Anion Gap 10.0 mmol/L      eGFR 88.9 mL/min/1.73     Narrative:      GFR Normal >60  Chronic Kidney Disease <60  Kidney Failure <15    The GFR formula is only valid for adults with stable renal function between ages 18 and 70.    CBC (No Diff) [[id number]]  (Abnormal) Collected: 03/09/23 0439    Specimen: Blood Updated: 03/09/23 0459     WBC 8.98 10*3/mm3      RBC 3.56 10*6/mm3      Hemoglobin 10.1 g/dL      Hematocrit 32.3 %      MCV 90.7 fL      MCH 28.4 pg      MCHC 31.3 g/dL      RDW 14.3 %      RDW-SD 48.4 fl      MPV 9.4 fL      Platelets 269 10*3/mm3         Imaging Results (Last 24 Hours)     ** No results found for the last 24 hours. **            Assessment & Plan     POD#3 ROXY SB rsxn    Dc ngt  Ice and popsicles prn  Continue to increase activity  Replete ANNABEL Rios MD  03/09/23  16:29 CST

## 2023-03-09 NOTE — THERAPY TREATMENT NOTE
Acute Care - Physical Therapy Treatment Note  [facility]     Patient Name: Hannah Dasilva  : 1929  MRN: [id number]  Today's Date: 3/9/2023      Visit Dx:     ICD-10-CM ICD-9-CM   1. Small bowel obstruction (HCC)  K56.609 560.9   2. Abdominal pain, acute, right lower quadrant  R10.31 789.03     338.19   3. Decreased stooling  R19.4 787.99   4. History of cholecystectomy  Z90.49 V45.79   5. Partial small bowel obstruction (HCC)  K56.600 560.9   6. Impaired mobility  Z74.09 799.89     Patient Active Problem List   Diagnosis   • Diarrhea in adult patient   • Nonsmoker   • Rectal bleeding   • Controlled type 2 diabetes mellitus without complication, without long-term current use of insulin (HCC)   • HTN (hypertension), benign   • Vasovagal syncope   • LAFB (left anterior fascicular block)   • RBBB   • Trifascicular block   • Dyspepsia   • Right upper quadrant abdominal pain   • Irritable bowel syndrome with diarrhea   • Acute pancreatitis   • Abnormal CT scan, gallbladder   • Dementia with behavioral disturbance   • Transaminitis   • Small bowel obstruction (HCC)     Past Medical History:   Diagnosis Date   • Diabetes mellitus (HCC)    • Disc degeneration, lumbar    • Diverticulosis    • Family history of colonic polyps    • History of adenomatous polyp of colon    • Hypercholesterolemia    • Irregular heartbeat    • LAFB (left anterior fascicular block) 10/26/2018   • MVP (mitral valve prolapse)    • Osteoarthritis    • Sciatica      Past Surgical History:   Procedure Laterality Date   • APPENDECTOMY     • BLADDER REPAIR     • CHOLECYSTECTOMY WITH INTRAOPERATIVE CHOLANGIOGRAM N/A 3/31/2020    Procedure: CHOLECYSTECTOMY LAPAROSCOPIC INTRAOPERATIVE CHOLANGIOGRAM;  Surgeon: Jenna Kinney MD;  Location: [facility];  Service: General;  Laterality: N/A;   • COLONOSCOPY  2010    diverticulosis   • COLONOSCOPY W/ POLYPECTOMY  2006    ASCENDING COLON ADENOMATOUS POLYP   • DIAGNOSTIC LAPAROSCOPY N/A  3/6/2023    Procedure: DIAGNOSTIC LAPAROSCOPY;  Surgeon: Brittaney Rios MD;  Location:  [facility];  Service: General;  Laterality: N/A;   • ENDOSCOPY  10/04/2006    SBBX UNREMARKABLE, SMALL HH, UREA NEG   • EXPLORATORY LAPAROTOMY N/A 3/6/2023    Procedure: LAPAROTOMY EXPLORATORY SMALL BOWEL RESECTION;  Surgeon: Brittaney Rios MD;  Location:  [facility];  Service: General;  Laterality: N/A;   • HYSTERECTOMY     • TONSILLECTOMY       PT Assessment (last 12 hours)     PT Evaluation and Treatment     Row Name 03/09/23 1024          Physical Therapy Time and Intention    Subjective Information complains of;weakness;fatigue  -LY     Document Type therapy note (daily note)  -LY     Mode of Treatment physical therapy  -LY     Row Name 03/09/23 1024          General Information    Existing Precautions/Restrictions fall  NG tube, abd binder  -LY     Row Name 03/09/23 1024          Pain    Pretreatment Pain Rating 6/10  -LY     Posttreatment Pain Rating 6/10  -LY     Pain Location - abdomen  -LY     Pain Intervention(s) Medication (See MAR);Ambulation/increased activity  -LY     Row Name 03/09/23 1024          Bed Mobility    Supine-Sit Britt (Bed Mobility) verbal cues;minimum assist (75% patient effort)  -LY     Assistive Device (Bed Mobility) bed rails;head of bed elevated  -LY     Row Name 03/09/23 1024          Transfers    Transfers sit-stand transfer;stand-sit transfer;stand pivot/stand step transfer  -LY     Row Name 03/09/23 1024          Sit-Stand Transfer    Sit-Stand Britt (Transfers) verbal cues;minimum assist (75% patient effort)  -LY     Assistive Device (Sit-Stand Transfers) walker, front-wheeled  -LY     Row Name 03/09/23 1024          Stand-Sit Transfer    Stand-Sit Britt (Transfers) verbal cues;minimum assist (75% patient effort)  -LY     Assistive Device (Stand-Sit Transfers) walker, front-wheeled  -LY     Row Name 03/09/23 1024          Stand Pivot/Stand Step Transfer    Stand  Pivot/Stand Step Chauncey (Transfers) verbal cues;contact guard  -LY     Assistive Device (Stand Pivot Stand Step Transfer) other (see comments)  HHA  -LY     Comment, (Stand Pivot Transfer) bed>BSC>chair  -LY     Row Name 03/09/23 1024          Gait/Stairs (Locomotion)    Chauncey Level (Gait) contact guard;verbal cues  -LY     Assistive Device (Gait) walker, front-wheeled  -LY     Distance in Feet (Gait) 5' forward/backward x2 reps  -LY     Deviations/Abnormal Patterns (Gait) festinating/shuffling;gait speed decreased  -LY     Bilateral Gait Deviations forward flexed posture  -LY     Comment, (Gait/Stairs) pt faituges quickly, cues for safety with RWX  -LY     Row Name             Wound 03/06/23 1446 abdomen Incision    Wound - Properties Group Placement Date: 03/06/23  -YAMILEX Placement Time: 1446 -YAMILEX Location: abdomen  -YAMILEX Primary Wound Type: Incision  -YAMILEX    Retired Wound - Properties Group Placement Date: 03/06/23  -YAMILEX Placement Time: 1446  -YAMILEX Location: abdomen  -YAMILEX Primary Wound Type: Incision  -YAMILEX    Retired Wound - Properties Group Date first assessed: 03/06/23  -YAMILEX Time first assessed: 1446  -YAMILEX Location: abdomen  -YAMILEX Primary Wound Type: Incision  -YAMILEX    Row Name 03/09/23 1024          Plan of Care Review    Plan of Care Reviewed With patient  -LY     Progress improving  -LY     Outcome Evaluation PT tx completed. PT requires min x1 for supine to sit. Stands with min x1 and able to t/f to BSC then to chair with CGA and HHA. After seated rest break pt able to amb forward/backward 5' x2 reps. Pt fatigues quickly and requries rest breaks. Cues for RWX safety and posture. Encouraged pt to sit up in chair. Will follow.  -LY     Row Name 03/09/23 1024          Positioning and Restraints    Pre-Treatment Position in bed  -LY     Post Treatment Position chair  -LY     In Chair reclined;call light within reach;encouraged to call for assist  -LY           User Key  (r) = Recorded By, (t) = Taken By, (c) =  Cosigned By    Initials Name Provider Type    Linda Reed, RN Registered Nurse    Snehal Huerta, PTA Physical Therapist Assistant                Physical Therapy Education     Title: PT OT SLP Therapies (In Progress)     Topic: Physical Therapy (In Progress)     Point: Mobility training (Done)     Learning Progress Summary           Patient Acceptance, DAMIR, VU by CHOLO at 3/8/2023 1207    Comment: Pt was educated on POC, benefits of activity, and safety with transfers.                   Point: Home exercise program (Not Started)     Learner Progress:  Not documented in this visit.          Point: Body mechanics (Not Started)     Learner Progress:  Not documented in this visit.          Point: Precautions (Not Started)     Learner Progress:  Not documented in this visit.                      User Key     Initials Effective Dates Name Provider Type Discipline    CHOLO 01/03/23 -  Purvi Deluna, PT Student PT Student PT              PT Recommendation and Plan     Plan of Care Reviewed With: patient  Progress: improving  Outcome Evaluation: PT tx completed. PT requires min x1 for supine to sit. Stands with min x1 and able to t/f to BSC then to chair with CGA and HHA. After seated rest break pt able to amb forward/backward 5' x2 reps. Pt fatigues quickly and requries rest breaks. Cues for RWX safety and posture. Encouraged pt to sit up in chair. Will follow.       Time Calculation:    PT Charges     Row Name 03/09/23 1126             Time Calculation    Start Time 1024  -LY      Stop Time 1048  -LY      Time Calculation (min) 24 min  -LY      PT Received On 03/09/23  -LY         Time Calculation- PT    Total Timed Code Minutes- PT 24 minute(s)  -LY         Timed Charges    15587 - Gait Training Minutes  10  -LY      55760 - PT Therapeutic Activity Minutes 14  -LY         Total Minutes    Timed Charges Total Minutes 24  -LY       Total Minutes 24  -LY            User Key  (r) = Recorded By, (t) = Taken By, (c) =  Cosigned By    Initials Name Provider Type    Snehal Huerta PTA Physical Therapist Assistant              Therapy Charges for Today     Code Description Service Date Service Provider Modifiers Qty    94596505029 HC GAIT TRAINING EA 15 MIN 3/9/2023 Snehal Eason, BACILIO GP 1    83944789930 HC PT THERAPEUTIC ACT EA 15 MIN 3/9/2023 Snehal Eason PTA GP 1          PT G-Codes  Outcome Measure Options: AM-PAC 6 Clicks Basic Mobility (PT)  AM-PAC 6 Clicks Score (PT): 17    Snehal Eason PTA  3/9/2023

## 2023-03-09 NOTE — PLAN OF CARE
Goal Outcome Evaluation:              Outcome Evaluation: RD nutrition assessment completed.  Pt remains NPO x 3 days with NG to suction at this time.  If unable to advance pt's diet to at least full liquids within the next 2 days, may wish to consider AMONS.  Following

## 2023-03-09 NOTE — PLAN OF CARE
Goal Outcome Evaluation:  Plan of Care Reviewed With: patient        Progress: no change  Outcome Evaluation: IVF; NG to LIWS; Up with assist to the bedside commode; voiding; No complaints of nausea; medicated for pain x2; Room air; resting between care; safety maintained

## 2023-03-10 PROCEDURE — 25010000002 MORPHINE PER 10 MG: Performed by: SPECIALIST

## 2023-03-10 PROCEDURE — 99024 POSTOP FOLLOW-UP VISIT: CPT | Performed by: SPECIALIST

## 2023-03-10 PROCEDURE — 97116 GAIT TRAINING THERAPY: CPT

## 2023-03-10 PROCEDURE — 25010000002 POTASSIUM CHLORIDE PER 2 MEQ: Performed by: SPECIALIST

## 2023-03-10 PROCEDURE — 97110 THERAPEUTIC EXERCISES: CPT

## 2023-03-10 PROCEDURE — 25010000002 ENOXAPARIN PER 10 MG: Performed by: SPECIALIST

## 2023-03-10 RX ORDER — METOPROLOL SUCCINATE 25 MG/1
25 TABLET, EXTENDED RELEASE ORAL DAILY
Status: DISCONTINUED | OUTPATIENT
Start: 2023-03-10 | End: 2023-03-13 | Stop reason: HOSPADM

## 2023-03-10 RX ORDER — ACETAMINOPHEN 325 MG/1
650 TABLET ORAL EVERY 6 HOURS PRN
Status: DISCONTINUED | OUTPATIENT
Start: 2023-03-10 | End: 2023-03-13 | Stop reason: HOSPADM

## 2023-03-10 RX ORDER — POTASSIUM CHLORIDE 750 MG/1
40 CAPSULE, EXTENDED RELEASE ORAL DAILY
Status: DISCONTINUED | OUTPATIENT
Start: 2023-03-11 | End: 2023-03-13 | Stop reason: HOSPADM

## 2023-03-10 RX ORDER — POTASSIUM CHLORIDE 14.9 MG/ML
20 INJECTION INTRAVENOUS
Status: COMPLETED | OUTPATIENT
Start: 2023-03-10 | End: 2023-03-10

## 2023-03-10 RX ADMIN — METOPROLOL TARTRATE 5 MG: 5 INJECTION INTRAVENOUS at 09:56

## 2023-03-10 RX ADMIN — METOPROLOL TARTRATE 5 MG: 5 INJECTION INTRAVENOUS at 02:41

## 2023-03-10 RX ADMIN — ENOXAPARIN SODIUM 30 MG: 100 INJECTION SUBCUTANEOUS at 09:34

## 2023-03-10 RX ADMIN — POTASSIUM CHLORIDE 20 MEQ: 14.9 INJECTION, SOLUTION INTRAVENOUS at 09:34

## 2023-03-10 RX ADMIN — DONEPEZIL HYDROCHLORIDE 10 MG: 10 TABLET ORAL at 20:14

## 2023-03-10 RX ADMIN — FAMOTIDINE 20 MG: 10 INJECTION INTRAVENOUS at 09:33

## 2023-03-10 RX ADMIN — TRAZODONE HYDROCHLORIDE 50 MG: 50 TABLET ORAL at 20:15

## 2023-03-10 RX ADMIN — DEXTROSE MONOHYDRATE, SODIUM CHLORIDE, AND POTASSIUM CHLORIDE 75 ML/HR: 50; 4.5; .745 INJECTION, SOLUTION INTRAVENOUS at 12:52

## 2023-03-10 RX ADMIN — PREGABALIN 100 MG: 100 CAPSULE ORAL at 09:34

## 2023-03-10 RX ADMIN — MORPHINE SULFATE 4 MG: 4 INJECTION, SOLUTION INTRAMUSCULAR; INTRAVENOUS at 19:25

## 2023-03-10 RX ADMIN — POTASSIUM CHLORIDE 20 MEQ: 14.9 INJECTION, SOLUTION INTRAVENOUS at 12:49

## 2023-03-10 RX ADMIN — MORPHINE SULFATE 4 MG: 4 INJECTION, SOLUTION INTRAMUSCULAR; INTRAVENOUS at 13:55

## 2023-03-10 RX ADMIN — PREGABALIN 100 MG: 100 CAPSULE ORAL at 15:17

## 2023-03-10 RX ADMIN — MORPHINE SULFATE 4 MG: 4 INJECTION, SOLUTION INTRAMUSCULAR; INTRAVENOUS at 02:43

## 2023-03-10 RX ADMIN — MORPHINE SULFATE 4 MG: 4 INJECTION, SOLUTION INTRAMUSCULAR; INTRAVENOUS at 09:33

## 2023-03-10 RX ADMIN — PREGABALIN 100 MG: 100 CAPSULE ORAL at 20:15

## 2023-03-10 RX ADMIN — METOPROLOL SUCCINATE 25 MG: 25 TABLET, EXTENDED RELEASE ORAL at 15:11

## 2023-03-10 NOTE — PLAN OF CARE
Goal Outcome Evaluation:  Plan of Care Reviewed With: patient        Progress: improving  Outcome Evaluation: No nausea; medicated for pain x2; up with assist and a walker; ambulated in the basnal and in her room; weakness improving; voiding; IVF; Passing flatus no BM yet; resting between care; safety maintained

## 2023-03-10 NOTE — PROGRESS NOTES
"Enter Query Response Below      Query Response: yes   Electronically signed by Brittaney Rios MD, 03/10/23, 10:44 AM CST.              If applicable, please update the problem list.   Patient: Hannah Dasilva        : 1929  Account: [id number]           Admit Date:         How to Respond to this query:       a. Click New Note     b. Answer query within the yellow box.                c. Update the Problem List, if applicable.      If you have any questions about this query contact me at:  [email or web address]     Dr. Rios,     89-year female patient admitted 23 with Small Bowel Obstruction status post Small Bowel Resection with Lysis of Adhesions. ()     Based on Medicare billing guidelines [facility] hospitals are not allowed to use diagnoses from pathology reports as the sole basis for billing. Any findings noted on a pathology report must be affirmed by the treating physician before billing.    The pathologist reported that the specimen shows \" Segment of small intestine (21.5 cm).  A.  Foci of stricturing.  B.  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  C.  Focal changes of acute serositis....\"    Is this finding consistent with your clinical impression and treatment plan for this patient?  > Yes  > No  > Other explanation for clinical impression and treatment plan_________  > Clinically indeterminable    By submitting this query, we are merely seeking further clarification of documentation to accurately reflect all conditions that you are monitoring, evaluating, treating or that extend the hospitalization or utilize additional resources of care. Please utilize your independent clinical judgment when addressing the question(s) above.     This query and your response, once completed, will be entered into the legal medical record.    Sincerely,  Urmila Duque RN, CCDS  Clinical Documentation Integrity Program   [email or web address]   "

## 2023-03-10 NOTE — PROGRESS NOTES
Brittaney Rios MD FACS  Progress Note     LOS: 5 days   Patient Care Team:  Aga Mcdermott DO as PCP - General  Aga Mcdermott DO as PCP - Family Medicine  Ulysses Ruiz DMD as Referring Physician (Dental General Practice)  Js Alaniz MD as Referring Physician (Cardiology)  Mo Jasso MD as Cardiologist (Cardiology)  Rony Lai MD as Consulting Physician (Gastroenterology)      Subjective     Chief complaint: POD#4    History of Present Illness:   Drinking coffee.  Denies nausea. +small flatus.  No bm.  Ambulated halls       The following portions of the patient's history were reviewed and updated as appropriate: allergies, current medications, past family history, past medical history, past social history, past surgical history, and problem list.    Objective     Physical Exam:    Vital Signs:  Temp:  [97.7 °F (36.5 °C)-98.7 °F (37.1 °C)] 98.1 °F (36.7 °C)  Heart Rate:  [75-85] 77  Resp:  [16-18] 16  BP: (141-178)/(58-86) 159/70      Constitutional:    Well-developed, well-nourished in no acute distress  Eyes:     Extraocular movements intact; pupils equal, round, and reactive  Ears, Nose, Mouth, Throat:  Hearing intact, nose midline, no mucosal lesions  Cardiovascular:   Regular rate and rhythm   Respiratory:    Clear to auscultation bilaterally  Gastrointestinal:   Softer, still some distension, clean  Genitourinary:    Deferred  Musculoskeletal:   Full range of motion, no muscle wasting, no weakness  Skin:     No rashes or excoriations  Neurological:    Moves all extremities, sensation intact  Psychiatric:    Alert and oriented to person, place, and time  Hematologic/Lymphatic/Immune: No lymphadenopathy      Results Review:    Lab Results (last 24 hours)     ** No results found for the last 24 hours. **        Imaging Results (Last 24 Hours)     ** No results found for the last 24 hours. **            Assessment & Plan     POD#4 ROXY hernandez    Continues to  improve  Replete K  Await full bowel function return      Brittaney Rios MD  03/10/23  13:16 CST

## 2023-03-10 NOTE — PLAN OF CARE
Goal Outcome Evaluation:  Plan of Care Reviewed With: patient        Progress: improving  Outcome Evaluation: PT tx completed. Pt requires CGA and increased time for supine to sit with HOB elevated. Stands with RWX and min x1. Able to amb up to 60' at a time with RWX and CGA. CGA for toilet t/f with use of grab bar. Completed BLE AROM x15 reps seated. Encouraged pt to sit up in chair. Will cont to follow.

## 2023-03-10 NOTE — PLAN OF CARE
Problem: Fall Injury Risk  Goal: Absence of Fall and Fall-Related Injury  3/9/2023 1853 by Earnestine Fisher LPN  Outcome: Ongoing, Progressing  3/9/2023 1852 by Earnestine Fisher LPN  Outcome: Ongoing, Progressing  3/9/2023 1851 by Earnestine Fisher LPN  Outcome: Ongoing, Progressing  Intervention: Promote Injury-Free Environment  Recent Flowsheet Documentation  Taken 3/9/2023 1718 by Earnestine Fisher LPN  Safety Promotion/Fall Prevention: safety round/check completed  Taken 3/9/2023 1715 by Earnestine Fisher LPN  Safety Promotion/Fall Prevention: safety round/check completed  Taken 3/9/2023 1309 by Earnestine Fisher LPN  Safety Promotion/Fall Prevention: safety round/check completed  Taken 3/9/2023 1239 by Earnestine Fisher LPN  Safety Promotion/Fall Prevention: safety round/check completed  Taken 3/9/2023 1000 by Earnestine Fisher LPN  Safety Promotion/Fall Prevention: safety round/check completed  Taken 3/9/2023 0800 by Earnestine Fisher LPN  Safety Promotion/Fall Prevention: safety round/check completed   Goal Outcome Evaluation:  Plan of Care Reviewed With: patient        Progress: improving  Outcome Evaluation: XIOMARA CAMPBELL'D TODAY PER DR. BENAVIDES. PT. REMAINS NPO. ROOM AIR. NO BM YET. PT. IS GETTING STRONGER. NO DISTRESS NOTED.

## 2023-03-10 NOTE — THERAPY TREATMENT NOTE
Acute Care - Physical Therapy Treatment Note  [facility]     Patient Name: Hannah Dasilva  : 1929  MRN: [id number]  Today's Date: 3/10/2023      Visit Dx:     ICD-10-CM ICD-9-CM   1. Small bowel obstruction (HCC)  K56.609 560.9   2. Abdominal pain, acute, right lower quadrant  R10.31 789.03     338.19   3. Decreased stooling  R19.4 787.99   4. History of cholecystectomy  Z90.49 V45.79   5. Partial small bowel obstruction (HCC)  K56.600 560.9   6. Impaired mobility  Z74.09 799.89     Patient Active Problem List   Diagnosis   • Diarrhea in adult patient   • Nonsmoker   • Rectal bleeding   • Controlled type 2 diabetes mellitus without complication, without long-term current use of insulin (HCC)   • HTN (hypertension), benign   • Vasovagal syncope   • LAFB (left anterior fascicular block)   • RBBB   • Trifascicular block   • Dyspepsia   • Right upper quadrant abdominal pain   • Irritable bowel syndrome with diarrhea   • Acute pancreatitis   • Abnormal CT scan, gallbladder   • Dementia with behavioral disturbance   • Transaminitis   • Small bowel obstruction (HCC)     Past Medical History:   Diagnosis Date   • Diabetes mellitus (HCC)    • Disc degeneration, lumbar    • Diverticulosis    • Family history of colonic polyps    • History of adenomatous polyp of colon    • Hypercholesterolemia    • Irregular heartbeat    • LAFB (left anterior fascicular block) 10/26/2018   • MVP (mitral valve prolapse)    • Osteoarthritis    • Sciatica      Past Surgical History:   Procedure Laterality Date   • APPENDECTOMY     • BLADDER REPAIR     • CHOLECYSTECTOMY WITH INTRAOPERATIVE CHOLANGIOGRAM N/A 3/31/2020    Procedure: CHOLECYSTECTOMY LAPAROSCOPIC INTRAOPERATIVE CHOLANGIOGRAM;  Surgeon: Jenna Kinney MD;  Location: [facility];  Service: General;  Laterality: N/A;   • COLONOSCOPY  2010    diverticulosis   • COLONOSCOPY W/ POLYPECTOMY  2006    ASCENDING COLON ADENOMATOUS POLYP   • DIAGNOSTIC LAPAROSCOPY N/A  3/6/2023    Procedure: DIAGNOSTIC LAPAROSCOPY;  Surgeon: Brittaney Rios MD;  Location:  [facility] OR;  Service: General;  Laterality: N/A;   • ENDOSCOPY  10/04/2006    SBBX UNREMARKABLE, SMALL HH, UREA NEG   • EXPLORATORY LAPAROTOMY N/A 3/6/2023    Procedure: LAPAROTOMY EXPLORATORY SMALL BOWEL RESECTION;  Surgeon: Brittaney Rios MD;  Location:  [facility] OR;  Service: General;  Laterality: N/A;   • HYSTERECTOMY     • TONSILLECTOMY       PT Assessment (last 12 hours)     PT Evaluation and Treatment     Row Name 03/10/23 0843          Physical Therapy Time and Intention    Subjective Information complains of;pain  -LY     Document Type therapy note (daily note)  -LY     Mode of Treatment physical therapy  -LY     Row Name 03/10/23 0843          General Information    Existing Precautions/Restrictions fall  abd binder  -LY     Row Name 03/10/23 0843          Pain    Pretreatment Pain Rating 6/10  -LY     Posttreatment Pain Rating 6/10  -LY     Pain Location - abdomen  -LY     Pain Intervention(s) Medication (See MAR);Ambulation/increased activity  -LY     Row Name 03/10/23 0843          Bed Mobility    Supine-Sit [place] (Bed Mobility) verbal cues;contact guard  -LY     Assistive Device (Bed Mobility) bed rails;head of bed elevated  -LY     Row Name 03/10/23 0843          Transfers    Transfers toilet transfer  -LY     Row Name 03/10/23 0843          Sit-Stand Transfer    Sit-Stand [place] (Transfers) verbal cues;minimum assist (75% patient effort)  -LY     Assistive Device (Sit-Stand Transfers) walker, front-wheeled  -LY     Row Name 03/10/23 0843          Stand-Sit Transfer    Stand-Sit [place] (Transfers) verbal cues;minimum assist (75% patient effort)  -LY     Assistive Device (Stand-Sit Transfers) walker, front-wheeled  -LY     Row Name 03/10/23 0843          Toilet Transfer    Type (Toilet Transfer) sit-stand;stand-sit  -LY     [place] Level (Toilet Transfer) verbal cues;contact guard  -LY      Assistive Device (Toilet Transfer) commode;grab bars/safety frame;walker, front-wheeled  -LY     Row Name 03/10/23 0843          Gait/Stairs (Locomotion)    [place] (Gait) verbal cues;contact guard  -LY     Assistive Device (Gait) walker, front-wheeled  -LY     Distance in Feet (Gait) 60'  -LY     Pattern (Gait) step-through  -LY     Deviations/Abnormal Patterns (Gait) gait speed decreased;stride length decreased  -LY     Bilateral Gait Deviations forward flexed posture;heel strike decreased  -LY     Comment, (Gait/Stairs) pt requires cues for forward posture, pt does not correct.  -LY     Row Name 03/10/23 0843          Motor Skills    Comments, Therapeutic Exercise BLE AROM x15 reps seated.  -LY     Additional Documentation Comments, Therapeutic Exercise (Row)  -LY     Row Name             Wound 03/06/23 1446 abdomen Incision    Wound - Properties Group Placement Date: 03/06/23  -YAMILEX Placement Time: 1446  -YAMILEX Location: abdomen  -YAMILEX Primary Wound Type: Incision  -YAMILEX    Retired Wound - Properties Group Placement Date: 03/06/23  -YAMILEX Placement Time: 1446  -YAMILEX Location: abdomen  -YAMILEX Primary Wound Type: Incision  -YAMILEX    Retired Wound - Properties Group Date first assessed: 03/06/23  -YAMILEX Time first assessed: 1446  -YAMILEX Location: abdomen  -YAMILEX Primary Wound Type: Incision  -YAMILEX    Row Name 03/10/23 0843          Plan of Care Review    Plan of Care Reviewed With patient  -LY     Progress improving  -LY     Outcome Evaluation PT tx completed. Pt requires CGA and increased time for supine to sit with HOB elevated. Stands with RWX and min x1. Able to amb up to 60' at a time with RWX and CGA. CGA for toilet t/f with use of grab bar. Completed BLE AROM x15 reps seated. Encouraged pt to sit up in chair. Will cont to follow.  -LY     Row Name 03/10/23 0843          Positioning and Restraints    Pre-Treatment Position in bed  -LY     Post Treatment Position chair  -LY     In Chair notified nsg;reclined;call light within  reach;encouraged to call for assist  -LY           User Key  (r) = Recorded By, (t) = Taken By, (c) = Cosigned By    Initials Name Provider Type    Linda Reed, RN Registered Nurse    Snehal Huerta, BACILIO Physical Therapist Assistant                Physical Therapy Education     Title: PT OT SLP Therapies (In Progress)     Topic: Physical Therapy (In Progress)     Point: Mobility training (Done)     Learning Progress Summary           Patient Acceptance, E, VU by CHOLO at 3/8/2023 1207    Comment: Pt was educated on POC, benefits of activity, and safety with transfers.                   Point: Home exercise program (Not Started)     Learner Progress:  Not documented in this visit.          Point: Body mechanics (Not Started)     Learner Progress:  Not documented in this visit.          Point: Precautions (Not Started)     Learner Progress:  Not documented in this visit.                      User Key     Initials Effective Dates Name Provider Type Discipline    CHOLO 01/03/23 -  Purvi Deluan, PT Student PT Student PT              PT Recommendation and Plan     Plan of Care Reviewed With: patient  Progress: improving  Outcome Evaluation: PT tx completed. Pt requires CGA and increased time for supine to sit with HOB elevated. Stands with RWX and min x1. Able to amb up to 60' at a time with RWX and CGA. CGA for toilet t/f with use of grab bar. Completed BLE AROM x15 reps seated. Encouraged pt to sit up in chair. Will cont to follow.   Outcome Measures     Row Name 03/10/23 0843             How much help from another person do you currently need...    Turning from your back to your side while in flat bed without using bedrails? 3  -LY      Moving from lying on back to sitting on the side of a flat bed without bedrails? 3  -LY      Moving to and from a bed to a chair (including a wheelchair)? 3  -LY      Standing up from a chair using your arms (e.g., wheelchair, bedside chair)? 3  -LY      Climbing 3-5 steps with a  railing? 2  -LY      To walk in hospital room? 3  -LY      AM-PAC 6 Clicks Score (PT) 17  -LY         Functional Assessment    Outcome Measure Options AM-PAC 6 Clicks Basic Mobility (PT)  -LY            User Key  (r) = Recorded By, (t) = Taken By, (c) = Cosigned By    Initials Name Provider Type    Snehal Huerta PTA Physical Therapist Assistant                 Time Calculation:    PT Charges     Row Name 03/10/23 0929             Time Calculation    Start Time 0843  -LY      Stop Time 0908  -LY      Time Calculation (min) 25 min  -LY      PT Received On 03/10/23  -LY         Time Calculation- PT    Total Timed Code Minutes- PT 25 minute(s)  -LY         Timed Charges    92090 - PT Therapeutic Exercise Minutes 10  -LY      60605 - Gait Training Minutes  15  -LY         Total Minutes    Timed Charges Total Minutes 25  -LY       Total Minutes 25  -LY            User Key  (r) = Recorded By, (t) = Taken By, (c) = Cosigned By    Initials Name Provider Type    Snehal Huerta PTA Physical Therapist Assistant              Therapy Charges for Today     Code Description Service Date Service Provider Modifiers Qty    22606664029 HC GAIT TRAINING EA 15 MIN 3/9/2023 Snehal Eason, PTA GP 1    61208757121 HC PT THERAPEUTIC ACT EA 15 MIN 3/9/2023 Snehal Eason, PTA GP 1    96624575591 HC PT THER PROC EA 15 MIN 3/10/2023 Snehal Eason, PTA GP 1    88112544213 HC GAIT TRAINING EA 15 MIN 3/10/2023 Snehal Eason, PTA GP 1          PT G-Codes  Outcome Measure Options: AM-PAC 6 Clicks Basic Mobility (PT)  AM-PAC 6 Clicks Score (PT): 17    Snehal Eason PTA  3/10/2023

## 2023-03-10 NOTE — CASE MANAGEMENT/SOCIAL WORK
Continued Stay Note  [facility]     Patient Name: Hannah Dasilva  MRN: [id number]  Today's Date: 3/10/2023    Admit Date: 3/5/2023    Plan: Home   Discharge Plan     Row Name 03/10/23 1151       Plan    Plan Home    Patient/Family in Agreement with Plan yes    Plan Comments Pt is working with therapy. She plans to return home at d/c. Will follow.               Discharge Codes    No documentation.               Expected Discharge Date and Time     Expected Discharge Date Expected Discharge Time    Mar 13, 2023             SHAUNNA Herrmann

## 2023-03-11 LAB
ANION GAP SERPL CALCULATED.3IONS-SCNC: 11 MMOL/L (ref 5–15)
BUN SERPL-MCNC: 8 MG/DL (ref 8–23)
BUN/CREAT SERPL: 15.7 (ref 7–25)
CALCIUM SPEC-SCNC: 8 MG/DL (ref 8.2–9.6)
CHLORIDE SERPL-SCNC: 100 MMOL/L (ref 98–107)
CO2 SERPL-SCNC: 24 MMOL/L (ref 22–29)
CREAT SERPL-MCNC: 0.51 MG/DL (ref 0.57–1)
DEPRECATED RDW RBC AUTO: 47.1 FL (ref 37–54)
EGFRCR SERPLBLD CKD-EPI 2021: 87.2 ML/MIN/1.73
ERYTHROCYTE [DISTWIDTH] IN BLOOD BY AUTOMATED COUNT: 14.2 % (ref 12.3–15.4)
GLUCOSE SERPL-MCNC: 156 MG/DL (ref 65–99)
HCT VFR BLD AUTO: 31.4 % (ref 34–46.6)
HGB BLD-MCNC: 9.9 G/DL (ref 12–15.9)
MAGNESIUM SERPL-MCNC: 1.8 MG/DL (ref 1.7–2.3)
MCH RBC QN AUTO: 28.6 PG (ref 26.6–33)
MCHC RBC AUTO-ENTMCNC: 31.5 G/DL (ref 31.5–35.7)
MCV RBC AUTO: 90.8 FL (ref 79–97)
PLATELET # BLD AUTO: 302 10*3/MM3 (ref 140–450)
PMV BLD AUTO: 9.4 FL (ref 6–12)
POTASSIUM SERPL-SCNC: 3.7 MMOL/L (ref 3.5–5.2)
RBC # BLD AUTO: 3.46 10*6/MM3 (ref 3.77–5.28)
SODIUM SERPL-SCNC: 135 MMOL/L (ref 136–145)
WBC NRBC COR # BLD: 7.59 10*3/MM3 (ref 3.4–10.8)

## 2023-03-11 PROCEDURE — 25010000002 ENOXAPARIN PER 10 MG: Performed by: SPECIALIST

## 2023-03-11 PROCEDURE — 25010000002 MORPHINE PER 10 MG: Performed by: SPECIALIST

## 2023-03-11 PROCEDURE — 80048 BASIC METABOLIC PNL TOTAL CA: CPT | Performed by: SPECIALIST

## 2023-03-11 PROCEDURE — 83735 ASSAY OF MAGNESIUM: CPT | Performed by: SPECIALIST

## 2023-03-11 PROCEDURE — 99024 POSTOP FOLLOW-UP VISIT: CPT | Performed by: SPECIALIST

## 2023-03-11 PROCEDURE — 85027 COMPLETE CBC AUTOMATED: CPT | Performed by: SPECIALIST

## 2023-03-11 RX ADMIN — TRAZODONE HYDROCHLORIDE 50 MG: 50 TABLET ORAL at 20:38

## 2023-03-11 RX ADMIN — PREGABALIN 100 MG: 100 CAPSULE ORAL at 09:29

## 2023-03-11 RX ADMIN — DONEPEZIL HYDROCHLORIDE 10 MG: 10 TABLET ORAL at 20:38

## 2023-03-11 RX ADMIN — ACETAMINOPHEN 650 MG: 325 TABLET, FILM COATED ORAL at 17:24

## 2023-03-11 RX ADMIN — PREGABALIN 100 MG: 100 CAPSULE ORAL at 15:45

## 2023-03-11 RX ADMIN — PREGABALIN 100 MG: 100 CAPSULE ORAL at 20:38

## 2023-03-11 RX ADMIN — MORPHINE SULFATE 4 MG: 4 INJECTION, SOLUTION INTRAMUSCULAR; INTRAVENOUS at 02:02

## 2023-03-11 RX ADMIN — ENOXAPARIN SODIUM 30 MG: 100 INJECTION SUBCUTANEOUS at 09:30

## 2023-03-11 RX ADMIN — METOPROLOL SUCCINATE 25 MG: 25 TABLET, EXTENDED RELEASE ORAL at 09:29

## 2023-03-11 RX ADMIN — POTASSIUM CHLORIDE 40 MEQ: 10 CAPSULE, COATED, EXTENDED RELEASE ORAL at 09:29

## 2023-03-11 RX ADMIN — DEXTROSE MONOHYDRATE, SODIUM CHLORIDE, AND POTASSIUM CHLORIDE 75 ML/HR: 50; 4.5; .745 INJECTION, SOLUTION INTRAVENOUS at 14:52

## 2023-03-11 RX ADMIN — DEXTROSE MONOHYDRATE, SODIUM CHLORIDE, AND POTASSIUM CHLORIDE 75 ML/HR: 50; 4.5; .745 INJECTION, SOLUTION INTRAVENOUS at 01:59

## 2023-03-11 NOTE — PLAN OF CARE
Goal Outcome Evaluation:           Progress: improving  Outcome Evaluation: A&O. Up x1 with a walker. Up in chair. Voiding. C/o pain; see MAR. Tele; NSR this shift. SCDs and lovenox for VTE. Room air. Diet advanced to clears; tolerating. M/L and lap x2 are c/d/i. IVF. Safety maintained.

## 2023-03-11 NOTE — PROGRESS NOTES
Jenna Kinney MD Progress Note     LOS: 6 days   Patient Care Team:  Aga Mcdermott DO as PCP - General  Aga Mcdermott DO as PCP - Family Medicine  Ulysses Ruiz DMD as Referring Physician (Dental General Practice)  Js Alaniz MD as Referring Physician (Cardiology)  Mo Jasso MD as Cardiologist (Cardiology)  Rony Lai MD as Consulting Physician (Gastroenterology)        Subjective     Doing well.  Passing flatus but no bowel movement yet.  Tolerating clear liquids.    Objective     Vital Signs  Temp:  [97.3 °F (36.3 °C)-99 °F (37.2 °C)] 97.6 °F (36.4 °C)  Heart Rate:  [69-85] 76  Resp:  [16-18] 18  BP: (149-178)/(58-86) 170/76    Intake & Output (last 3 days)       03/08 0701  03/09 0700 03/09 0701  03/10 0700 03/10 0701  03/11 0700 03/11 0701  03/12 0700    P.O. 0 0      I.V. (mL/kg) 737 (10.2)  3235 (44.6)     IV Piggyback  100      Total Intake(mL/kg) 737 (10.2) 100 (1.4) 3235 (44.6)     Urine (mL/kg/hr) 1425 (0.8) 800 (0.5) 2100 (1.2) 500 (6.4)    Emesis/NG output 150 300      Total Output 1575 1100 2100 500    Net -838 -1000 +1135 -500            Urine Unmeasured Occurrence 1 x             Physical Exam:     General Appearance:    Alert, cooperative, in no acute distress   Lungs:     respirations regular, even and unlabored    Heart:    Regular rhythm and normal rate, normal S1 and S2, no            murmur, no gallop, no rub   Chest Wall:    No abnormalities observed   Abdomen:      Soft slightly distended slightly tender good bowel sounds fairly normal active   Extremities: No edema,    Results Review:     I reviewed the patient's new clinical results.    Lab Results (last 72 hours)     Procedure Component Value Units Date/Time    Basic Metabolic Panel [[id number]]  (Abnormal) Collected: 03/11/23 0417    Specimen: Blood Updated: 03/11/23 0551     Glucose 156 mg/dL      BUN 8 mg/dL      Creatinine 0.51 mg/dL      Sodium 135 mmol/L      Potassium 3.7 mmol/L       Chloride 100 mmol/L      CO2 24.0 mmol/L      Calcium 8.0 mg/dL      BUN/Creatinine Ratio 15.7     Anion Gap 11.0 mmol/L      eGFR 87.2 mL/min/1.73     Narrative:      GFR Normal >60  Chronic Kidney Disease <60  Kidney Failure <15    The GFR formula is only valid for adults with stable renal function between ages 18 and 70.    Magnesium [[id number]]  (Normal) Collected: 03/11/23 0417    Specimen: Blood Updated: 03/11/23 0550     Magnesium 1.8 mg/dL     CBC (No Diff) [[id number]]  (Abnormal) Collected: 03/11/23 0417    Specimen: Blood Updated: 03/11/23 0521     WBC 7.59 10*3/mm3      RBC 3.46 10*6/mm3      Hemoglobin 9.9 g/dL      Hematocrit 31.4 %      MCV 90.8 fL      MCH 28.6 pg      MCHC 31.5 g/dL      RDW 14.2 %      RDW-SD 47.1 fl      MPV 9.4 fL      Platelets 302 10*3/mm3     Basic Metabolic Panel [[id number]]  (Abnormal) Collected: 03/09/23 0439    Specimen: Blood Updated: 03/09/23 0517     Glucose 110 mg/dL      BUN 19 mg/dL      Creatinine 0.47 mg/dL      Sodium 142 mmol/L      Potassium 3.3 mmol/L      Chloride 104 mmol/L      CO2 28.0 mmol/L      Calcium 8.4 mg/dL      BUN/Creatinine Ratio 40.4     Anion Gap 10.0 mmol/L      eGFR 88.9 mL/min/1.73     Narrative:      GFR Normal >60  Chronic Kidney Disease <60  Kidney Failure <15    The GFR formula is only valid for adults with stable renal function between ages 18 and 70.    CBC (No Diff) [[id number]]  (Abnormal) Collected: 03/09/23 0439    Specimen: Blood Updated: 03/09/23 0459     WBC 8.98 10*3/mm3      RBC 3.56 10*6/mm3      Hemoglobin 10.1 g/dL      Hematocrit 32.3 %      MCV 90.7 fL      MCH 28.4 pg      MCHC 31.3 g/dL      RDW 14.3 %      RDW-SD 48.4 fl      MPV 9.4 fL      Platelets 269 10*3/mm3     Tissue Pathology Exam [[id number]] Collected: 03/06/23 1517    Specimen: Tissue from Small Intestine Updated: 03/08/23 1315     Note to Patients --     This report may contain a detailed description of human tissue sent by a health care provider to the  "laboratory for pathologic evaluation. The content of this report is essential for diagnosis and may provide important critical findings. This information may be unfamiliar to patients to review without a medical professional present. It is advised that the patient review this report in the presence of a health care provider who can answer questions and explain the results.       Case Report --     Surgical Pathology Report                         Case: [id number]                                  Authorizing Provider:  Brittaney Rios MD       Collected:           03/06/2023 03:17 PM          Ordering Location:     [facility] OR  Received:            03/07/2023 08:19 AM          Pathologist:           Bess Marti MD                                                        Specimen:    Small Intestine, Small bowel obstruction                                                    Final Diagnosis --     Segment of small intestine (21.5 cm).  A.  Foci of stricturing.  B.  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  C.  Focal changes of acute serositis.  D.  No adenomatous changes identified.  E.  The proximal and distal surgical margins are viable.  F.  No histologic evidence of malignancy.  G.  Clinical history of small bowel obstruction.       Gross Description --     1. Small Intestine.   Received in a formalin filled container labeled with the patient's name, date of birth, and \"small bowel obstruction\".  The specimen consists of a loop of small bowel measuring 21.5 cm in length by 2.8 cm in diameter.  The central 18.2 cm segment of the small bowel is brown to black dusky and necrotic.  The discoloration measures 1.3 cm from 1 stapled resection margin and 0.8 cm from the opposite stapled resection margin.  A tight stricture is identified at the junction of discoloration towards 1 stapled resection margin narrowing to 0.9 cm in diameter.  A less obvious stricture is " noted in the discoloration transition of the opposite stapled resection margin.  The bowel is opened and reveals red-brown, sloughing mucosa with somewhat flattened folds.  The bowel wall averages 0.1 cm in thickness and exhibits full-thickness, red-brown necrosis.     The resection margins are shaved and submitted in blocks 1A and 1B.  A representative section of each stricture are submitted in blocks 1C and 1D.  Representative sections of remaining small bowel necrosis are submitted in block 1E.         Microscopic Description --     Sections of the small bowel segment reveal small intestinal tissue.  The villi are not atrophic.  Crypt abscesses or granulomata are not identified.  Foci of stricturing are noted.  There are areas of transmural infarction with hemorrhage and an area of mucosal infarction with hemorrhage.  There are no adenomatous changes seen.  Neutrophils can be seen in the serosa.  The proximal and distal surgical margins are viable.  There is no evidence of malignancy.          Imaging Results (Last 72 Hours)     ** No results found for the last 72 hours. **              Assessment & Plan       Small bowel obstruction (HCC)      We will advance to full liquids.  Keep ambulating.  Await GI function return      Jenna Kinney MD  03/11/23  08:05 CST

## 2023-03-11 NOTE — PLAN OF CARE
Outcome Evaluation: IVF; Up x1 with walker; voiding; Medicated for pain x2 this shift; Tele running s-st; SCD's for VTE; Room air; tolerating clear liquids no complaints of nausea; passing flatus no BM yet; resting between care; safety maintained    Problem: Adult Inpatient Plan of Care  Goal: Plan of Care Review  Outcome: Ongoing, Progressing  Flowsheets (Taken 3/11/2023 0344)  Progress: improving  Plan of Care Reviewed With: patient  Outcome Evaluation:   IVF   Up x1 with walker   voiding   Medicated for pain x2 this shift   Tele running s-st   SCD's for VTE   Room air   tolerating clear liquids no complaints of nausea   passing flatus no BM yet   resting between care   safety maintained  Goal: Patient-Specific Goal (Individualized)  Outcome: Ongoing, Progressing  Goal: Absence of Hospital-Acquired Illness or Injury  Outcome: Ongoing, Progressing  Intervention: Identify and Manage Fall Risk  Recent Flowsheet Documentation  Taken 3/11/2023 0202 by Tressa Gibbons, RN  Safety Promotion/Fall Prevention: safety round/check completed  Taken 3/10/2023 2355 by Tressa Gibbons RN  Safety Promotion/Fall Prevention: safety round/check completed  Taken 3/10/2023 2230 by Tressa Gibbons RN  Safety Promotion/Fall Prevention: safety round/check completed  Taken 3/10/2023 1925 by Shai, Tressa D, RN  Safety Promotion/Fall Prevention: safety round/check completed  Intervention: Prevent Skin Injury  Recent Flowsheet Documentation  Taken 3/10/2023 1925 by Tressa Gibbons, RN  Body Position: sitting up in bed  Intervention: Prevent and Manage VTE (Venous Thromboembolism) Risk  Recent Flowsheet Documentation  Taken 3/11/2023 0202 by Tressa Gibbons, RN  Activity Management: ambulated to bathroom  Taken 3/10/2023 1925 by Tressa Gibbons, RN  Activity Management:   activity encouraged   ambulated to bathroom  VTE Prevention/Management:   bilateral   sequential compression devices on  Goal: Optimal Comfort  and Wellbeing  Outcome: Ongoing, Progressing  Intervention: Monitor Pain and Promote Comfort  Recent Flowsheet Documentation  Taken 3/11/2023 0202 by Tressa Gibbons RN  Pain Management Interventions: see MAR  Goal: Readiness for Transition of Care  Outcome: Ongoing, Progressing     Problem: Pain Acute  Goal: Acceptable Pain Control and Functional Ability  Outcome: Ongoing, Progressing  Intervention: Develop Pain Management Plan  Recent Flowsheet Documentation  Taken 3/11/2023 0202 by Tressa Gibbons RN  Pain Management Interventions: see MAR     Problem: Fall Injury Risk  Goal: Absence of Fall and Fall-Related Injury  Outcome: Ongoing, Progressing  Intervention: Promote Injury-Free Environment  Recent Flowsheet Documentation  Taken 3/11/2023 0202 by Tressa Gibbons, RN  Safety Promotion/Fall Prevention: safety round/check completed  Taken 3/10/2023 2355 by Tressa Gibbons RN  Safety Promotion/Fall Prevention: safety round/check completed  Taken 3/10/2023 2230 by Tressa Gibbons RN  Safety Promotion/Fall Prevention: safety round/check completed  Taken 3/10/2023 1925 by Shai, Tressa D, RN  Safety Promotion/Fall Prevention: safety round/check completed     Goal Outcome Evaluation:  Plan of Care Reviewed With: patient        Progress: improving  Outcome Evaluation: IVF; Up x1 with walker; voiding; Medicated for pain x2 this shift; Tele running s-st; SCD's for VTE; Room air; tolerating clear liquids no complaints of nausea; passing flatus no BM yet; resting between care; safety maintained

## 2023-03-12 LAB
ANION GAP SERPL CALCULATED.3IONS-SCNC: 10 MMOL/L (ref 5–15)
BUN SERPL-MCNC: 6 MG/DL (ref 8–23)
BUN/CREAT SERPL: 12 (ref 7–25)
CALCIUM SPEC-SCNC: 8.8 MG/DL (ref 8.2–9.6)
CHLORIDE SERPL-SCNC: 102 MMOL/L (ref 98–107)
CO2 SERPL-SCNC: 25 MMOL/L (ref 22–29)
CREAT SERPL-MCNC: 0.5 MG/DL (ref 0.57–1)
DEPRECATED RDW RBC AUTO: 45.4 FL (ref 37–54)
EGFRCR SERPLBLD CKD-EPI 2021: 87.6 ML/MIN/1.73
ERYTHROCYTE [DISTWIDTH] IN BLOOD BY AUTOMATED COUNT: 13.9 % (ref 12.3–15.4)
GLUCOSE SERPL-MCNC: 138 MG/DL (ref 65–99)
HCT VFR BLD AUTO: 35.4 % (ref 34–46.6)
HGB BLD-MCNC: 11.3 G/DL (ref 12–15.9)
MCH RBC QN AUTO: 28.5 PG (ref 26.6–33)
MCHC RBC AUTO-ENTMCNC: 31.9 G/DL (ref 31.5–35.7)
MCV RBC AUTO: 89.4 FL (ref 79–97)
PLATELET # BLD AUTO: 377 10*3/MM3 (ref 140–450)
PMV BLD AUTO: 9.4 FL (ref 6–12)
POTASSIUM SERPL-SCNC: 4.2 MMOL/L (ref 3.5–5.2)
RBC # BLD AUTO: 3.96 10*6/MM3 (ref 3.77–5.28)
SODIUM SERPL-SCNC: 137 MMOL/L (ref 136–145)
WBC NRBC COR # BLD: 7.64 10*3/MM3 (ref 3.4–10.8)

## 2023-03-12 PROCEDURE — 99024 POSTOP FOLLOW-UP VISIT: CPT | Performed by: SPECIALIST

## 2023-03-12 PROCEDURE — 80048 BASIC METABOLIC PNL TOTAL CA: CPT | Performed by: SPECIALIST

## 2023-03-12 PROCEDURE — 85027 COMPLETE CBC AUTOMATED: CPT | Performed by: SPECIALIST

## 2023-03-12 PROCEDURE — 97116 GAIT TRAINING THERAPY: CPT

## 2023-03-12 PROCEDURE — 25010000002 ENOXAPARIN PER 10 MG: Performed by: SPECIALIST

## 2023-03-12 RX ORDER — PANTOPRAZOLE SODIUM 40 MG/1
40 TABLET, DELAYED RELEASE ORAL
Status: DISCONTINUED | OUTPATIENT
Start: 2023-03-12 | End: 2023-03-13 | Stop reason: HOSPADM

## 2023-03-12 RX ADMIN — PREGABALIN 100 MG: 100 CAPSULE ORAL at 09:27

## 2023-03-12 RX ADMIN — DEXTROSE MONOHYDRATE, SODIUM CHLORIDE, AND POTASSIUM CHLORIDE 75 ML/HR: 50; 4.5; .745 INJECTION, SOLUTION INTRAVENOUS at 05:30

## 2023-03-12 RX ADMIN — METOPROLOL SUCCINATE 25 MG: 25 TABLET, EXTENDED RELEASE ORAL at 09:27

## 2023-03-12 RX ADMIN — ENOXAPARIN SODIUM 30 MG: 100 INJECTION SUBCUTANEOUS at 09:27

## 2023-03-12 RX ADMIN — PREGABALIN 100 MG: 100 CAPSULE ORAL at 16:38

## 2023-03-12 RX ADMIN — TRAZODONE HYDROCHLORIDE 50 MG: 50 TABLET ORAL at 20:11

## 2023-03-12 RX ADMIN — PREGABALIN 100 MG: 100 CAPSULE ORAL at 20:12

## 2023-03-12 RX ADMIN — DONEPEZIL HYDROCHLORIDE 10 MG: 10 TABLET ORAL at 20:11

## 2023-03-12 RX ADMIN — POTASSIUM CHLORIDE 40 MEQ: 10 CAPSULE, COATED, EXTENDED RELEASE ORAL at 09:27

## 2023-03-12 RX ADMIN — PANTOPRAZOLE SODIUM 40 MG: 40 TABLET, DELAYED RELEASE ORAL at 13:46

## 2023-03-12 NOTE — PLAN OF CARE
Goal Outcome Evaluation:  Plan of Care Reviewed With: patient    Pt tolerating regular diet, IV SL, BM today. Reported acid reflux, protonix po ordered. Ambulating in bansal with WW and sba. No complaints of pain. Safety maintained.

## 2023-03-12 NOTE — PLAN OF CARE
Outcome Evaluation: IVF; No request for pain medication tonight; up with assist x1 and a walker; Ambulated in the bansal; Voiding; SCD's for VTE; Patient having loose bowel movements; ML dressing changed per patient request; Binder in place; resting between care; safety maintained    Problem: Adult Inpatient Plan of Care  Goal: Plan of Care Review  Outcome: Ongoing, Progressing  Flowsheets (Taken 3/12/2023 0349)  Progress: improving  Plan of Care Reviewed With: patient  Outcome Evaluation:   IVF   No request for pain medication tonight   up with assist x1 and a walker   Ambulated in the bansal   Voiding   SCD's for VTE   Patient having loose bowel movements   ML dressing changed per patient request   Binder in place   resting between care   safety maintained  Goal: Patient-Specific Goal (Individualized)  Outcome: Ongoing, Progressing  Goal: Absence of Hospital-Acquired Illness or Injury  Outcome: Ongoing, Progressing  Intervention: Identify and Manage Fall Risk  Recent Flowsheet Documentation  Taken 3/11/2023 2030 by Tressa Gibbons, RN  Safety Promotion/Fall Prevention: safety round/check completed  Intervention: Prevent Skin Injury  Recent Flowsheet Documentation  Taken 3/11/2023 2030 by Tressa Gibbons RN  Body Position: sitting up in bed  Intervention: Prevent and Manage VTE (Venous Thromboembolism) Risk  Recent Flowsheet Documentation  Taken 3/11/2023 2030 by Tressa Gibbons, RN  Activity Management: activity adjusted per tolerance  Goal: Optimal Comfort and Wellbeing  Outcome: Ongoing, Progressing  Intervention: Monitor Pain and Promote Comfort  Recent Flowsheet Documentation  Taken 3/11/2023 2030 by Tressa Gibbons, RN  Pain Management Interventions: relaxation techniques promoted  Goal: Readiness for Transition of Care  Outcome: Ongoing, Progressing     Problem: Fall Injury Risk  Goal: Absence of Fall and Fall-Related Injury  Outcome: Ongoing, Progressing  Intervention: Promote Injury-Free  Environment  Recent Flowsheet Documentation  Taken 3/11/2023 2030 by Tressa Gibbons, RN  Safety Promotion/Fall Prevention: safety round/check completed     Problem: Pain Acute  Goal: Acceptable Pain Control and Functional Ability  Outcome: Ongoing, Progressing  Intervention: Develop Pain Management Plan  Recent Flowsheet Documentation  Taken 3/11/2023 2030 by Tressa Gibbons, RN  Pain Management Interventions: relaxation techniques promoted     Goal Outcome Evaluation:  Plan of Care Reviewed With: patient        Progress: improving  Outcome Evaluation: IVF; No request for pain medication tonight; up with assist x1 and a walker; Ambulated in the bansal; Voiding; SCD's for VTE; Patient having loose bowel movements; ML dressing changed per patient request; Binder in place; resting between care; safety maintained

## 2023-03-12 NOTE — PROGRESS NOTES
Jenna Kinney MD Progress Note     LOS: 7 days   Patient Care Team:  Aga Mcdermott DO as PCP - General  Aga Mcdermott DO as PCP - Family Medicine  Ulysses Ruiz DMD as Referring Physician (Dental General Practice)  Js Alaniz MD as Referring Physician (Cardiology)  Mo Jasso MD as Cardiologist (Cardiology)  Rony Lai MD as Consulting Physician (Gastroenterology)        Subjective     She is doing very well.  Her bowels are moving.  She tolerated full liquids without difficulty yesterday wants more food    Objective     Vital Signs  Temp:  [97.5 °F (36.4 °C)-99.5 °F (37.5 °C)] 97.7 °F (36.5 °C)  Heart Rate:  [70-86] 86  Resp:  [16] 16  BP: (144-171)/(58-84) 171/62    Intake & Output (last 3 days)       03/09 0701  03/10 0700 03/10 0701  03/11 0700 03/11 0701  03/12 0700 03/12 0701  03/13 0700    P.O. 0  480     I.V. (mL/kg)  3235 (44.6) 1871 (25.8)     IV Piggyback 100       Total Intake(mL/kg) 100 (1.4) 3235 (44.6) 2351 (32.4)     Urine (mL/kg/hr) 800 (0.5) 2100 (1.2) 800 (0.5)     Emesis/NG output 300       Stool   0     Total Output 1100 2100 800     Net -1000 +1135 +1551             Urine Unmeasured Occurrence   7 x 1 x    Stool Unmeasured Occurrence   5 x           Physical Exam:     General Appearance:    Alert, cooperative, in no acute distress   Lungs:     respirations regular, even and unlabored    Heart:    Regular rhythm and normal rate, normal S1 and S2, no            murmur, no gallop, no rub   Chest Wall:    No abnormalities observed   Abdomen:      Soft nontender nondistended   Extremities: No edema,    Results Review:     I reviewed the patient's new clinical results.    Lab Results (last 72 hours)     Procedure Component Value Units Date/Time    Basic Metabolic Panel [[id number]]  (Abnormal) Collected: 03/12/23 0502    Specimen: Blood Updated: 03/12/23 0607     Glucose 138 mg/dL      BUN 6 mg/dL      Creatinine 0.50 mg/dL      Sodium 137 mmol/L       Potassium 4.2 mmol/L      Chloride 102 mmol/L      CO2 25.0 mmol/L      Calcium 8.8 mg/dL      BUN/Creatinine Ratio 12.0     Anion Gap 10.0 mmol/L      eGFR 87.6 mL/min/1.73     Narrative:      GFR Normal >60  Chronic Kidney Disease <60  Kidney Failure <15    The GFR formula is only valid for adults with stable renal function between ages 18 and 70.    CBC (No Diff) [[id number]]  (Abnormal) Collected: 03/12/23 0502    Specimen: Blood Updated: 03/12/23 0544     WBC 7.64 10*3/mm3      RBC 3.96 10*6/mm3      Hemoglobin 11.3 g/dL      Hematocrit 35.4 %      MCV 89.4 fL      MCH 28.5 pg      MCHC 31.9 g/dL      RDW 13.9 %      RDW-SD 45.4 fl      MPV 9.4 fL      Platelets 377 10*3/mm3     Basic Metabolic Panel [[id number]]  (Abnormal) Collected: 03/11/23 0417    Specimen: Blood Updated: 03/11/23 0551     Glucose 156 mg/dL      BUN 8 mg/dL      Creatinine 0.51 mg/dL      Sodium 135 mmol/L      Potassium 3.7 mmol/L      Chloride 100 mmol/L      CO2 24.0 mmol/L      Calcium 8.0 mg/dL      BUN/Creatinine Ratio 15.7     Anion Gap 11.0 mmol/L      eGFR 87.2 mL/min/1.73     Narrative:      GFR Normal >60  Chronic Kidney Disease <60  Kidney Failure <15    The GFR formula is only valid for adults with stable renal function between ages 18 and 70.    Magnesium [[id number]]  (Normal) Collected: 03/11/23 0417    Specimen: Blood Updated: 03/11/23 0550     Magnesium 1.8 mg/dL     CBC (No Diff) [[id number]]  (Abnormal) Collected: 03/11/23 0417    Specimen: Blood Updated: 03/11/23 0521     WBC 7.59 10*3/mm3      RBC 3.46 10*6/mm3      Hemoglobin 9.9 g/dL      Hematocrit 31.4 %      MCV 90.8 fL      MCH 28.6 pg      MCHC 31.5 g/dL      RDW 14.2 %      RDW-SD 47.1 fl      MPV 9.4 fL      Platelets 302 10*3/mm3         Imaging Results (Last 72 Hours)     ** No results found for the last 72 hours. **              Assessment & Plan       Small bowel obstruction (HCC)      Plan-we will advance to regular diet Hep-Lock MARY ELLEN Kinney,  MD  03/12/23  08:34 CDT

## 2023-03-12 NOTE — PLAN OF CARE
Goal Outcome Evaluation:  Plan of Care Reviewed With: patient        Progress: improving  Outcome Evaluation: Pt was SBA sidelying to sit and c/o sharp pains in abdomen occasionally.Pt was sba to stand and walked with forward flexed posture 60ft at one time. Pt was educated about stairs but felt she was not ready to try at this time. Pt would benefit from continued PT.

## 2023-03-12 NOTE — THERAPY TREATMENT NOTE
Acute Care - Physical Therapy Treatment Note  [facility]     Patient Name: Hannah Dasilva  : 1929  MRN: [id number]  Today's Date: 3/12/2023      Visit Dx:     ICD-10-CM ICD-9-CM   1. Small bowel obstruction (HCC)  K56.609 560.9   2. Abdominal pain, acute, right lower quadrant  R10.31 789.03     338.19   3. Decreased stooling  R19.4 787.99   4. History of cholecystectomy  Z90.49 V45.79   5. Partial small bowel obstruction (HCC)  K56.600 560.9   6. Impaired mobility  Z74.09 799.89     Patient Active Problem List   Diagnosis   • Diarrhea in adult patient   • Nonsmoker   • Rectal bleeding   • Controlled type 2 diabetes mellitus without complication, without long-term current use of insulin (HCC)   • HTN (hypertension), benign   • Vasovagal syncope   • LAFB (left anterior fascicular block)   • RBBB   • Trifascicular block   • Dyspepsia   • Right upper quadrant abdominal pain   • Irritable bowel syndrome with diarrhea   • Acute pancreatitis   • Abnormal CT scan, gallbladder   • Dementia with behavioral disturbance   • Transaminitis   • Small bowel obstruction (HCC)     Past Medical History:   Diagnosis Date   • Diabetes mellitus (HCC)    • Disc degeneration, lumbar    • Diverticulosis    • Family history of colonic polyps    • History of adenomatous polyp of colon    • Hypercholesterolemia    • Irregular heartbeat    • LAFB (left anterior fascicular block) 10/26/2018   • MVP (mitral valve prolapse)    • Osteoarthritis    • Sciatica      Past Surgical History:   Procedure Laterality Date   • APPENDECTOMY     • BLADDER REPAIR     • CHOLECYSTECTOMY WITH INTRAOPERATIVE CHOLANGIOGRAM N/A 3/31/2020    Procedure: CHOLECYSTECTOMY LAPAROSCOPIC INTRAOPERATIVE CHOLANGIOGRAM;  Surgeon: Jenna Kinney MD;  Location: [facility];  Service: General;  Laterality: N/A;   • COLONOSCOPY  2010    diverticulosis   • COLONOSCOPY W/ POLYPECTOMY  2006    ASCENDING COLON ADENOMATOUS POLYP   • DIAGNOSTIC LAPAROSCOPY N/A  3/6/2023    Procedure: DIAGNOSTIC LAPAROSCOPY;  Surgeon: Brittaney Rios MD;  Location:  [facility];  Service: General;  Laterality: N/A;   • ENDOSCOPY  10/04/2006    SBBX UNREMARKABLE, SMALL HH, UREA NEG   • EXPLORATORY LAPAROTOMY N/A 3/6/2023    Procedure: LAPAROTOMY EXPLORATORY SMALL BOWEL RESECTION;  Surgeon: Brittaney Rios MD;  Location:  [facility];  Service: General;  Laterality: N/A;   • HYSTERECTOMY     • TONSILLECTOMY       PT Assessment (last 12 hours)     PT Evaluation and Treatment     Row Name 03/12/23 0932          Physical Therapy Time and Intention    Subjective Information complains of  -AE     Document Type therapy note (daily note)  -AE     Mode of Treatment physical therapy  -AE     Row Name 03/12/23 0932          General Information    Existing Precautions/Restrictions fall  abd binder  -AE     Row Name 03/12/23 0932          Bed Mobility    Supine-Sit Morgan (Bed Mobility) supervision  -AE     Row Name 03/12/23 0932          Sit-Stand Transfer    Sit-Stand Morgan (Transfers) contact guard;verbal cues  -AE     Row Name 03/12/23 0932          Stand-Sit Transfer    Stand-Sit Morgan (Transfers) contact guard  -AE     Row Name 03/12/23 0932          Toilet Transfer    Morgan Level (Toilet Transfer) supervision  -AE     Row Name 03/12/23 0932          Gait/Stairs (Locomotion)    Morgan Level (Gait) contact guard  -AE     Row Name             Wound 03/06/23 1446 abdomen Incision    Wound - Properties Group Placement Date: 03/06/23  -YAMILEX Placement Time: 1446  -YAMILEX Location: abdomen  -YAMILEX Primary Wound Type: Incision  -YAMILEX    Retired Wound - Properties Group Placement Date: 03/06/23  -YAMILEX Placement Time: 1446  -YAMILEX Location: abdomen  -YAMILEX Primary Wound Type: Incision  -YAMILEX    Retired Wound - Properties Group Date first assessed: 03/06/23  -YAMILEX Time first assessed: 1446  -YAMILEX Location: abdomen  -YAMILEX Primary Wound Type: Incision  -YAMILEX    Row Name 03/12/23 0932          Positioning and  Restraints    Pre-Treatment Position in bed  -AE           User Key  (r) = Recorded By, (t) = Taken By, (c) = Cosigned By    Initials Name Provider Type    AE Cinthia Stubbs PTA Physical Therapist Assistant    Linda Reed, RN Registered Nurse                Physical Therapy Education     Title: PT OT SLP Therapies (In Progress)     Topic: Physical Therapy (In Progress)     Point: Mobility training (Done)     Learning Progress Summary           Patient Eager, E, VU by AE at 3/12/2023 0946    Comment: stairs    Acceptance, E, VU by AA at 3/8/2023 1207    Comment: Pt was educated on POC, benefits of activity, and safety with transfers.                   Point: Home exercise program (Not Started)     Learner Progress:  Not documented in this visit.          Point: Body mechanics (Not Started)     Learner Progress:  Not documented in this visit.          Point: Precautions (Not Started)     Learner Progress:  Not documented in this visit.                      User Key     Initials Effective Dates Name Provider Type Discipline    AE 02/03/23 -  Cinthia Stubbs PTA Physical Therapist Assistant PT    AA 01/03/23 -  Purvi Deluna PT Student PT Student PT              PT Recommendation and Plan     Plan of Care Reviewed With: patient  Progress: improving  Outcome Evaluation: Pt was SBA sidelying to sit and c/o sharp pains occasionally.Pt was sba to stand and walked with forward flexed posture 60ft at one time. Pt was educated about stairs but felt she was not ready to try at this time. Pt would benefit from continued PT.   Outcome Measures     Row Name 03/12/23 0900 03/10/23 0843          How much help from another person do you currently need...    Turning from your back to your side while in flat bed without using bedrails? 3  -AE 3  -LY     Moving from lying on back to sitting on the side of a flat bed without bedrails? 3  -AE 3  -LY     Moving to and from a bed to a chair (including a wheelchair)? 3  -AE 3   -LY     Standing up from a chair using your arms (e.g., wheelchair, bedside chair)? 3  -AE 3  -LY     Climbing 3-5 steps with a railing? 2  -AE 2  -LY     To walk in hospital room? 3  -AE 3  -LY     AM-PAC 6 Clicks Score (PT) 17  -AE 17  -LY        Functional Assessment    Outcome Measure Options AM-PAC 6 Clicks Basic Mobility (PT)  -AE AM-PAC 6 Clicks Basic Mobility (PT)  -LY           User Key  (r) = Recorded By, (t) = Taken By, (c) = Cosigned By    Initials Name Provider Type    AE Cinthia Stubbs PTA Physical Therapist Assistant    Snehal Huerta PTA Physical Therapist Assistant                 Time Calculation:    PT Charges     Row Name 03/12/23 1004             Time Calculation    Start Time 0932  -AE      Stop Time 0957  -AE      Time Calculation (min) 25 min  -AE      PT Received On 03/12/23  -AE      PT Goal Re-Cert Due Date 03/18/23  -AE         Time Calculation- PT    Total Timed Code Minutes- PT 25 minute(s)  -AE         Timed Charges    60348 - Gait Training Minutes  25  -AE         Total Minutes    Timed Charges Total Minutes 25  -AE       Total Minutes 25  -AE            User Key  (r) = Recorded By, (t) = Taken By, (c) = Cosigned By    Initials Name Provider Type    Cinthia Wise PTA Physical Therapist Assistant              Therapy Charges for Today     Code Description Service Date Service Provider Modifiers Qty    40072650541 HC GAIT TRAINING EA 15 MIN 3/12/2023 Cinthia Stubbs PTA GP 2          PT G-Codes  Outcome Measure Options: AM-PAC 6 Clicks Basic Mobility (PT)  AM-PAC 6 Clicks Score (PT): Stephanie Stubbs PTA  3/12/2023

## 2023-03-12 NOTE — PLAN OF CARE
"Goal Outcome Evaluation:  Plan of Care Reviewed With: patient           Outcome Evaluation:  this am but improved to 150's, abd incision with small area dried drainage, pt has x 3 loose bm's with one being large amount, up in chair several hours, wears SCS\"s while in bed, prn tylenol given, on RA, HR NSR 70's, IVF infusing, tolerating full liquids, no c/o of  nausea, safety maintained  "

## 2023-03-13 ENCOUNTER — READMISSION MANAGEMENT (OUTPATIENT)
Dept: CALL CENTER | Facility: HOSPITAL | Age: 88
End: 2023-03-13
Payer: MEDICARE

## 2023-03-13 VITALS
RESPIRATION RATE: 18 BRPM | SYSTOLIC BLOOD PRESSURE: 136 MMHG | BODY MASS INDEX: 24.25 KG/M2 | HEART RATE: 68 BPM | OXYGEN SATURATION: 95 % | DIASTOLIC BLOOD PRESSURE: 69 MMHG | TEMPERATURE: 97.7 F | WEIGHT: 160 LBS | HEIGHT: 68 IN

## 2023-03-13 PROCEDURE — 99024 POSTOP FOLLOW-UP VISIT: CPT | Performed by: SPECIALIST

## 2023-03-13 PROCEDURE — 97530 THERAPEUTIC ACTIVITIES: CPT

## 2023-03-13 PROCEDURE — 25010000002 ENOXAPARIN PER 10 MG: Performed by: SPECIALIST

## 2023-03-13 PROCEDURE — 97116 GAIT TRAINING THERAPY: CPT

## 2023-03-13 RX ADMIN — PANTOPRAZOLE SODIUM 40 MG: 40 TABLET, DELAYED RELEASE ORAL at 05:56

## 2023-03-13 RX ADMIN — ENOXAPARIN SODIUM 30 MG: 100 INJECTION SUBCUTANEOUS at 09:16

## 2023-03-13 RX ADMIN — ACETAMINOPHEN 650 MG: 325 TABLET, FILM COATED ORAL at 09:16

## 2023-03-13 RX ADMIN — POTASSIUM CHLORIDE 40 MEQ: 10 CAPSULE, COATED, EXTENDED RELEASE ORAL at 09:06

## 2023-03-13 RX ADMIN — METOPROLOL SUCCINATE 25 MG: 25 TABLET, EXTENDED RELEASE ORAL at 09:06

## 2023-03-13 RX ADMIN — PREGABALIN 100 MG: 100 CAPSULE ORAL at 09:16

## 2023-03-13 NOTE — PLAN OF CARE
Goal Outcome Evaluation:           Progress: improving   Goals met.  Patient being discharged home with family.  VSS.  Dsg CDI to abdomen.  Tolerating regular diet.  Safety maintained.  Verbalizes understanding of all discharge instructions.

## 2023-03-13 NOTE — PROGRESS NOTES
Brittaney Rios MD FACS  Progress Note     LOS: 8 days   Patient Care Team:  Aga Mcdermott DO as PCP - General  Aga Mcdermott DO as PCP - Family Medicine  Ulysses Ruiz DMD as Referring Physician (Dental General Practice)  Js Alaniz MD as Referring Physician (Cardiology)  Mo Jasso MD as Cardiologist (Cardiology)  Rony Lai MD as Consulting Physician (Gastroenterology)      Subjective     Chief complaint: POD#7    History of Present Illness:   Eating lunch.  No nausea  +bm's       The following portions of the patient's history were reviewed and updated as appropriate: allergies, current medications, past family history, past medical history, past social history, past surgical history, and problem list.    Objective     Physical Exam:    Vital Signs:  Temp:  [97.6 °F (36.4 °C)-98 °F (36.7 °C)] 97.7 °F (36.5 °C)  Heart Rate:  [63-86] 68  Resp:  [16-18] 18  BP: (136-166)/(67-72) 136/69      Constitutional:    Well-developed, well-nourished in no acute distress  Eyes:     Extraocular movements intact; pupils equal, round, and reactive  Ears, Nose, Mouth, Throat:  Hearing intact, nose midline, no mucosal lesions  Cardiovascular:   Regular rate and rhythm   Respiratory:    Clear to auscultation bilaterally  Gastrointestinal:   Soft, appropriately tender, clean  Genitourinary:    Deferred  Musculoskeletal:   Full range of motion, no muscle wasting, no weakness  Skin:     No rashes or excoriations  Neurological:    Moves all extremities, sensation intact  Psychiatric:    Alert and oriented to person, place, and time  Hematologic/Lymphatic/Immune: No lymphadenopathy      Results Review:    Lab Results (last 24 hours)     ** No results found for the last 24 hours. **        Imaging Results (Last 24 Hours)     ** No results found for the last 24 hours. **            Assessment & Plan     POD#7 ROXY SB rsxn    home      Brittaney Rios MD  03/13/23  13:06 CDT

## 2023-03-13 NOTE — PLAN OF CARE
Goal Outcome Evaluation:  Plan of Care Reviewed With: patient        Progress: improving  Outcome Evaluation: Patient rested well throughout the night. Patient ambulates with ease using walker and stand by assistance. Patient states she only feel pain in her abdomen when moving positions but it is minor. VSS

## 2023-03-13 NOTE — THERAPY TREATMENT NOTE
Acute Care - Physical Therapy Treatment Note  [facility]     Patient Name: Hannah Dasilva  : 1929  MRN: [id number]  Today's Date: 3/13/2023      Visit Dx:     ICD-10-CM ICD-9-CM   1. Small bowel obstruction (HCC)  K56.609 560.9   2. Abdominal pain, acute, right lower quadrant  R10.31 789.03     338.19   3. Decreased stooling  R19.4 787.99   4. History of cholecystectomy  Z90.49 V45.79   5. Partial small bowel obstruction (HCC)  K56.600 560.9   6. Impaired mobility  Z74.09 799.89     Patient Active Problem List   Diagnosis   • Diarrhea in adult patient   • Nonsmoker   • Rectal bleeding   • Controlled type 2 diabetes mellitus without complication, without long-term current use of insulin (HCC)   • HTN (hypertension), benign   • Vasovagal syncope   • LAFB (left anterior fascicular block)   • RBBB   • Trifascicular block   • Dyspepsia   • Right upper quadrant abdominal pain   • Irritable bowel syndrome with diarrhea   • Acute pancreatitis   • Abnormal CT scan, gallbladder   • Dementia with behavioral disturbance   • Transaminitis   • Small bowel obstruction (HCC)     Past Medical History:   Diagnosis Date   • Diabetes mellitus (HCC)    • Disc degeneration, lumbar    • Diverticulosis    • Family history of colonic polyps    • History of adenomatous polyp of colon    • Hypercholesterolemia    • Irregular heartbeat    • LAFB (left anterior fascicular block) 10/26/2018   • MVP (mitral valve prolapse)    • Osteoarthritis    • Sciatica      Past Surgical History:   Procedure Laterality Date   • APPENDECTOMY     • BLADDER REPAIR     • CHOLECYSTECTOMY WITH INTRAOPERATIVE CHOLANGIOGRAM N/A 3/31/2020    Procedure: CHOLECYSTECTOMY LAPAROSCOPIC INTRAOPERATIVE CHOLANGIOGRAM;  Surgeon: Jenna Kinney MD;  Location: [facility];  Service: General;  Laterality: N/A;   • COLONOSCOPY  2010    diverticulosis   • COLONOSCOPY W/ POLYPECTOMY  2006    ASCENDING COLON ADENOMATOUS POLYP   • DIAGNOSTIC LAPAROSCOPY N/A  3/6/2023    Procedure: DIAGNOSTIC LAPAROSCOPY;  Surgeon: Brittaney Rios MD;  Location:  [facility];  Service: General;  Laterality: N/A;   • ENDOSCOPY  10/04/2006    SBBX UNREMARKABLE, SMALL HH, UREA NEG   • EXPLORATORY LAPAROTOMY N/A 3/6/2023    Procedure: LAPAROTOMY EXPLORATORY SMALL BOWEL RESECTION;  Surgeon: Brittaney Rios MD;  Location:  [facility];  Service: General;  Laterality: N/A;   • HYSTERECTOMY     • TONSILLECTOMY       PT Assessment (last 12 hours)     PT Evaluation and Treatment     Row Name 03/13/23 1109          Physical Therapy Time and Intention    Subjective Information no complaints  -NW     Document Type therapy note (daily note)  -NW     Mode of Treatment physical therapy  -NW     Row Name 03/13/23 1109          General Information    Existing Precautions/Restrictions fall  abd binder  -NW     Row Name 03/13/23 1109          Pain    Pain Location - abdomen  -NW     Row Name 03/13/23 1109          Pain Scale: FACES Pre/Post-Treatment    Posttreatment Pain Rating 2-->hurts little bit  -NW     Row Name 03/13/23 1109          Bed Mobility    Supine-Sit Gallia (Bed Mobility) supervision  -NW     Comment, (Bed Mobility) extra time  -NW     Row Name 03/13/23 1109          Sit-Stand Transfer    Sit-Stand Gallia (Transfers) verbal cues;supervision  -NW     Row Name 03/13/23 1109          Stand-Sit Transfer    Stand-Sit Gallia (Transfers) supervision  -NW     Row Name 03/13/23 1109          Toilet Transfer    Gallia Level (Toilet Transfer) supervision  -NW     Row Name 03/13/23 1109          Gait/Stairs (Locomotion)    Gallia Level (Gait) standby assist  -NW     Assistive Device (Gait) walker, front-wheeled  -NW     Distance in Feet (Gait) 50x5  -NW     Pattern (Gait) step-through  -NW     Deviations/Abnormal Patterns (Gait) gait speed decreased  -NW     Bilateral Gait Deviations forward flexed posture  -NW     Gallia Level (Stairs) verbal cues;stand by assist  -NW      Handrail Location (Stairs) left side (ascending)  -NW     Number of Steps (Stairs) 2  -NW     Comment, (Gait/Stairs) reivewed w/ family and pt home safety and POC. Family had safety concerns about d/c home w/ HH reviewd all questions and concerns. notified nsg and SW  -NW     Row Name 03/13/23 1109          Safety Issues, Functional Mobility    Impairments Affecting Function (Mobility) balance  -NW     Row Name             Wound 03/06/23 1446 abdomen Incision    Wound - Properties Group Placement Date: 03/06/23  -YAMILEX Placement Time: 1446 -YAMILEX Location: abdomen  -YAMILEX Primary Wound Type: Incision  -YAMILEX    Retired Wound - Properties Group Placement Date: 03/06/23  -YAMILEX Placement Time: 1446 -YAMILEX Location: abdomen  -YAMILEX Primary Wound Type: Incision  -YAMILEX    Retired Wound - Properties Group Date first assessed: 03/06/23  -YAMILEX Time first assessed: 1446 -YAMILEX Location: abdomen  -YAMILEX Primary Wound Type: Incision  -YAMILEX    Row Name 03/13/23 1109          Positioning and Restraints    Pre-Treatment Position in bed  -NW     Post Treatment Position chair  -NW     In Chair reclined;call light within reach;encouraged to call for assist;with family/caregiver  -NW           User Key  (r) = Recorded By, (t) = Taken By, (c) = Cosigned By    Initials Name Provider Type    Wendi Pablo PTA Physical Therapist Assistant    Linda Reed, RN Registered Nurse                Physical Therapy Education     Title: PT OT SLP Therapies (In Progress)     Topic: Physical Therapy (In Progress)     Point: Mobility training (Done)     Learning Progress Summary           Patient EagerDAMIR, VU by AE at 3/12/2023 0946    Comment: staDAMIR Johnson, VU by AA at 3/8/2023 1207    Comment: Pt was educated on POC, benefits of activity, and safety with transfers.                   Point: Home exercise program (Not Started)     Learner Progress:  Not documented in this visit.          Point: Body mechanics (Not Started)     Learner Progress:  Not  documented in this visit.          Point: Precautions (Not Started)     Learner Progress:  Not documented in this visit.                      User Key     Initials Effective Dates Name Provider Type Discipline    AE 02/03/23 -  Cinthia Stubbs PTA Physical Therapist Assistant PT    AA 01/03/23 -  Purvi Deluna, PT Student PT Student PT              PT Recommendation and Plan     Plan of Care Reviewed With: patient  Progress: improving  Outcome Evaluation: Bed mobility supervision w/ extra time. sit-stand sba Pt amb to BR pt able to clean self w/ cues and sba. Pt amb hallway 50' at a time sba/cga cues for safety. Pt up/down 2 steps sba/cga. Reviewed home safety and POC. Feel pt would benefit from HH upon d/c for PT/OT for cont safety, mobility and strength.   Outcome Measures     Row Name 03/12/23 0900             How much help from another person do you currently need...    Turning from your back to your side while in flat bed without using bedrails? 3  -AE      Moving from lying on back to sitting on the side of a flat bed without bedrails? 3  -AE      Moving to and from a bed to a chair (including a wheelchair)? 3  -AE      Standing up from a chair using your arms (e.g., wheelchair, bedside chair)? 3  -AE      Climbing 3-5 steps with a railing? 2  -AE      To walk in hospital room? 3  -AE      AM-PAC 6 Clicks Score (PT) 17  -AE         Functional Assessment    Outcome Measure Options AM-PAC 6 Clicks Basic Mobility (PT)  -AE            User Key  (r) = Recorded By, (t) = Taken By, (c) = Cosigned By    Initials Name Provider Type    AE Cinthia Stubbs PTA Physical Therapist Assistant                 Time Calculation:    PT Charges     Row Name 03/13/23 1150             Time Calculation    Start Time 1109  -NW      Stop Time 1150  -NW      Time Calculation (min) 41 min  -NW      PT Received On 03/13/23  -NW      PT Goal Re-Cert Due Date 03/18/23  -NW         Time Calculation- PT    Total Timed Code Minutes- PT 41  minute(s)  -NW         Timed Charges    83114 - Gait Training Minutes  23  -NW      57193 - PT Therapeutic Activity Minutes 18  -NW         Total Minutes    Timed Charges Total Minutes 41  -NW       Total Minutes 41  -NW            User Key  (r) = Recorded By, (t) = Taken By, (c) = Cosigned By    Initials Name Provider Type    NW Wendi Sagastume PTA Physical Therapist Assistant              Therapy Charges for Today     Code Description Service Date Service Provider Modifiers Qty    99011689103 HC GAIT TRAINING EA 15 MIN 3/13/2023 Wendi Sagastume PTA GP 2    67288820953 HC PT THERAPEUTIC ACT EA 15 MIN 3/13/2023 Wendi Sagastume PTA GP 1          PT G-Codes  Outcome Measure Options: AM-PAC 6 Clicks Basic Mobility (PT)  AM-PAC 6 Clicks Score (PT): 17    Wendi Sagastume PTA  3/13/2023

## 2023-03-13 NOTE — PLAN OF CARE
Goal Outcome Evaluation:  Plan of Care Reviewed With: patient        Progress: improving  Outcome Evaluation: Bed mobility supervision w/ extra time. sit-stand sba Pt amb to BR pt able to clean self w/ cues and sba. Pt amb hallway 50' at a time sba/cga cues for safety. Pt up/down 2 steps sba/cga. Reviewed home safety and POC. Feel pt would benefit from HH upon d/c for PT/OT for cont safety, mobility and strength.

## 2023-03-13 NOTE — DISCHARGE SUMMARY
Consults     No orders found from 2/4/2023 to 3/6/2023.      Brittaney Rios MD FACS Discharge Summary    Date of Discharge:  3/13/2023    Discharge Diagnosis: small bowel obstruction    Presenting Problem/History of Present Illness  Small bowel obstruction (HCC) [K56.609]     Hospital Course  Patient is a 89 y.o. female presented with abdominal pain, nausea, and vomiting.  Imaging demonstrated small bowel obstruction.  She was admitted, hydrated, and decompressed with NGT. Due to her degree of pain, she underwent immediate SBFT.  It demonstrated dilated small bowel with minimal progress.  She underwent diagnostic laparoscopy, lysis of adhesion, laparotomy, and small bowel resection.  She had a closed loop obstruction of an ischemic/necrotic loop of small bowel.  Diet was advanced as bowel function returned.  She was discharged home on all home meds.      Procedures Performed  Procedure(s):  DIAGNOSTIC LAPAROSCOPY  LAPAROTOMY EXPLORATORY SMALL BOWEL RESECTION       Consults:   Consults     No orders found from 2/4/2023 to 3/6/2023.          Condition on Discharge:  good    Vital Signs  Temp:  [97.6 °F (36.4 °C)-98 °F (36.7 °C)] 97.7 °F (36.5 °C)  Heart Rate:  [68-86] 68  Resp:  [16-18] 18  BP: (136-166)/(67-72) 136/69    Physical Exam:   See History and Physical found in chart.    Discharge Disposition  Home or Self Care    Discharge Medications     Discharge Medications      Continue These Medications      Instructions Start Date   ascorbic acid 500 MG tablet  Commonly known as: VITAMIN C   500 mg, Oral, Daily, immune      Calcium 1000 + D 1000-20 MG-MCG tablet  Generic drug: Calcium Carb-Cholecalciferol   1 tablet, Oral, Daily      colestipol 1 g tablet  Commonly known as: COLESTID   1 g, Oral, 4 Times Daily      donepezil 10 MG tablet  Commonly known as: ARICEPT   10 mg, Oral, Nightly      estradiol 10 MCG tablet vaginal tablet  Commonly known as: VAGIFEM   1 tablet, Vaginal, 2 Times Weekly      Estrogens  Conjugated 0.625 MG/GM vaginal cream  Commonly known as: PREMARIN   0.5 g, Vaginal, 2 Times Weekly      Magnesium 250 MG tablet   1 tablet, Oral, 2 times daily, Leg cramps      metoprolol succinate XL 25 MG 24 hr tablet  Commonly known as: TOPROL-XL   25 mg, Oral, Daily, Half in the am half in the pm.      MiraLax 17 GM/SCOOP powder  Generic drug: polyethylene glycol   17 g, Oral, Daily      multivitamin with minerals tablet tablet   2 tablets, Oral, Daily      Potassium 99 MG tablet   1 tablet, Oral, 2 Times Daily      pregabalin 100 MG capsule  Commonly known as: LYRICA   100 mg, Oral, 3 Times Daily      psyllium 58.6 % packet  Commonly known as: METAMUCIL   1 packet, Oral, Daily      traZODone 50 MG tablet  Commonly known as: DESYREL   50 mg, Oral, Nightly      Vitamin D3 50 MCG (2000 UT) capsule   2,000 Units, Oral, 2 Times Daily      ZINC 15 PO   1 tablet, Oral, Daily, immune             Discharge Diet:     Activity at Discharge:     Follow-up Appointments  Future Appointments   Date Time Provider Department Center   3/20/2023 10:30 AM Brittaney Rios MD [facility]         Test Results Pending at Discharge       Brittaney Rios MD  03/13/23  17:29 CDT

## 2023-03-13 NOTE — PROGRESS NOTES
Consults     No orders found from 2/4/2023 to 3/6/2023.      Brittaney Rios MD FACS Discharge Summary    Date of Discharge:  3/13/2023    Discharge Diagnosis: small bowel obstruction    Presenting Problem/History of Present Illness  Small bowel obstruction (HCC) [K56.609]     Hospital Course  Patient is a 89 y.o. female presented with abdominal pain, nausea, and vomiting.  She was admitted, hydrated, and SBFT was performed.  She did not tolerate well and contrast did not reach the distal small bowel.  She underwent diagnostic laparoscopy, lysis of adhesions, laparotomy, small bowel resection for closed loops obstruction.  NGT was removed and diet was advanced as bowel function returned.  All home meds were resumed upon discharge.  She will follow up in one week.      Procedures Performed  Procedure(s):  DIAGNOSTIC LAPAROSCOPY  LAPAROTOMY EXPLORATORY SMALL BOWEL RESECTION       Consults:   Consults     No orders found from 2/4/2023 to 3/6/2023.          Condition on Discharge:  good    Vital Signs  Temp:  [97.6 °F (36.4 °C)-98 °F (36.7 °C)] 97.7 °F (36.5 °C)  Heart Rate:  [63-86] 68  Resp:  [16-18] 18  BP: (136-166)/(67-72) 136/69    Physical Exam:   See History and Physical found in chart.    Discharge Disposition  Home or Self Care    Discharge Medications     Discharge Medications      Continue These Medications      Instructions Start Date   ascorbic acid 500 MG tablet  Commonly known as: VITAMIN C   500 mg, Oral, Daily, immune      Calcium 1000 + D 1000-20 MG-MCG tablet  Generic drug: Calcium Carb-Cholecalciferol   1 tablet, Oral, Daily      colestipol 1 g tablet  Commonly known as: COLESTID   1 g, Oral, 4 Times Daily      donepezil 10 MG tablet  Commonly known as: ARICEPT   10 mg, Oral, Nightly      estradiol 10 MCG tablet vaginal tablet  Commonly known as: VAGIFEM   1 tablet, Vaginal, 2 Times Weekly      Estrogens Conjugated 0.625 MG/GM vaginal cream  Commonly known as: PREMARIN   0.5 g, Vaginal, 2 Times  Weekly      Magnesium 250 MG tablet   1 tablet, Oral, 2 times daily, Leg cramps      metoprolol succinate XL 25 MG 24 hr tablet  Commonly known as: TOPROL-XL   25 mg, Oral, Daily, Half in the am half in the pm.      MiraLax 17 GM/SCOOP powder  Generic drug: polyethylene glycol   17 g, Oral, Daily      Potassium 99 MG tablet   1 tablet, Oral, 2 Times Daily      pregabalin 100 MG capsule  Commonly known as: LYRICA   100 mg, Oral, 3 Times Daily      psyllium 58.6 % packet  Commonly known as: METAMUCIL   1 packet, Oral, Daily      traZODone 50 MG tablet  Commonly known as: DESYREL   50 mg, Oral, Nightly      Vitamin D3 50 MCG (2000 UT) capsule   2,000 Units, Oral, 2 Times Daily      ZINC 15 PO   1 tablet, Oral, Daily, immune         ASK your doctor about these medications      Instructions Start Date   multivitamin with minerals tablet tablet   2 tablets, Oral, Daily             Discharge Diet:     Activity at Discharge:     Follow-up Appointments  No future appointments.  [unfilled]    Test Results Pending at Discharge       April L MD Gabriel  03/13/23  13:09 CDT

## 2023-03-14 NOTE — OUTREACH NOTE
Prep Survey    Flowsheet Row Responses   [facility] facility patient discharged from? [facility]   Is LACE score < 7 ? No   Eligibility Readm Mgmt   Discharge diagnosis small bowel obstruction   Does the patient have one of the following disease processes/diagnoses(primary or secondary)? General Surgery   Does the patient have Home health ordered? No   Is there a DME ordered? No   Prep survey completed? Yes          Shanta CLARK - Registered Nurse

## 2023-03-17 ENCOUNTER — READMISSION MANAGEMENT (OUTPATIENT)
Dept: CALL CENTER | Facility: HOSPITAL | Age: 88
End: 2023-03-17
Payer: MEDICARE

## 2023-03-17 NOTE — OUTREACH NOTE
General Surgery Week 1 Survey    Flowsheet Row Responses   [facility] facility patient discharged from? [place]   Does the patient have one of the following disease processes/diagnoses(primary or secondary)? General Surgery   Week 1 attempt successful? No   Unsuccessful attempts Attempt 1          Michelle Vogt Registered Nurse

## 2023-03-20 ENCOUNTER — OFFICE VISIT (OUTPATIENT)
Dept: SURGERY | Facility: CLINIC | Age: 88
End: 2023-03-20
Payer: MEDICARE

## 2023-03-20 VITALS
HEIGHT: 68 IN | TEMPERATURE: 98 F | SYSTOLIC BLOOD PRESSURE: 132 MMHG | OXYGEN SATURATION: 95 % | DIASTOLIC BLOOD PRESSURE: 74 MMHG | WEIGHT: 160.05 LBS | BODY MASS INDEX: 24.26 KG/M2 | HEART RATE: 82 BPM

## 2023-03-20 DIAGNOSIS — Z48.89 POSTOPERATIVE VISIT: Primary | ICD-10-CM

## 2023-03-20 DIAGNOSIS — Z48.89 AFTERCARE FOLLOWING SURGERY: ICD-10-CM

## 2023-03-20 PROCEDURE — 1159F MED LIST DOCD IN RCRD: CPT | Performed by: SPECIALIST

## 2023-03-20 PROCEDURE — 1160F RVW MEDS BY RX/DR IN RCRD: CPT | Performed by: SPECIALIST

## 2023-03-20 PROCEDURE — 99024 POSTOP FOLLOW-UP VISIT: CPT | Performed by: SPECIALIST

## 2023-03-20 NOTE — PROGRESS NOTES
"Brittaney Rios MD [facility] Progress Note    Referring Provider: No ref. provider found      Chief complaint   Chief Complaint   Patient presents with   • Post-op     Mrs. Dasilva is here for a post op for small bowel obstruction.         Subjective .     History of present illness:  Hannah Dasilva  is a 89 y.o. female who presents status post diagnostic laparoscopy, laparoscopic lysis of adhesions, exploratory laparotomy, lysis of adhesions, small bowel resection    History    The following portions of the patient's history were reviewed and updated as appropriate: allergies, current medications, past family history, past medical history, past social history, past surgical history, and problem list.    Objective     Vital Signs   /74 (BP Location: Left arm, Patient Position: Sitting, Cuff Size: Adult)   Pulse 82   Temp 98 °F (36.7 °C)   Ht 172.7 cm (67.99\")   Wt 72.6 kg (160 lb 0.9 oz)   SpO2 95%   BMI 24.34 kg/m²      Physical Exam:    General The patient is well-developed, well-nourished, and in no acute distress.    Abdomen The incision is well-approximated and healing without signs of infection.       BMI is within normal parameters. No other follow-up for BMI required.    Assessment & Plan       Diagnoses and all orders for this visit:    1. Postoperative visit (Primary)    2. Aftercare following surgery           The patient will return in 3-4 weeks.      Brittaney Rios MD              "

## 2023-03-22 DIAGNOSIS — N95.2 ATROPHIC VAGINITIS: ICD-10-CM

## 2023-03-22 DIAGNOSIS — R10.2 VAGINAL PAIN: ICD-10-CM

## 2023-03-22 RX ORDER — ESTRADIOL 10 UG/1
INSERT VAGINAL
Qty: 8 TABLET | Refills: 11 | Status: SHIPPED | OUTPATIENT
Start: 2023-03-22

## 2023-03-23 ENCOUNTER — READMISSION MANAGEMENT (OUTPATIENT)
Dept: CALL CENTER | Facility: HOSPITAL | Age: 88
End: 2023-03-23
Payer: MEDICARE

## 2023-03-23 NOTE — OUTREACH NOTE
General Surgery Week 2 Survey    Flowsheet Row Responses   [facility] patient discharged from? [place]   Does the patient have one of the following disease processes/diagnoses(primary or secondary)? General Surgery   Week 2 attempt successful? Yes   Call start time 1608   Call end time 1616   Discharge diagnosis small bowel obstruction, diagnostic lap, laparotomy exploratory small bowel resection   Person spoke with today (if not patient) and relationship Patient   Meds reviewed with patient/caregiver? Yes   Does the patient have all medications related to this admission filled (includes all antibiotics, pain medications, etc.) Yes   Is the patient taking all medications as directed (includes completed medication regime)? Yes   Does the patient have a follow up appointment scheduled with their surgeon? Yes   Has the patient kept scheduled appointments due by today? Yes   Has home health visited the patient within 72 hours of discharge? N/A   Psychosocial issues? No   Did the patient receive a copy of their discharge instructions? Yes   Nursing interventions Reviewed instructions with patient   What is the patient's perception of their health status since discharge? Improving   Nursing interventions Nurse provided patient education   Is the patient /caregiver able to teach back basic post-op care? No tub bath, swimming, or hot tub until instructed by MD, Keep incision areas clean,dry and protected  [Patient reports that Dr Rios told her she can shower. ]   Is the patient/caregiver able to teach back signs and symptoms of incisional infection? --  [Staples have been removed and clear dressing applied by Dr Rios at f/u appt. patient also wearing abdominal binder. ]   Is the patient/caregiver able to teach back steps to recovery at home? Set small, achievable goals for return to baseline health, Rest and rebuild strength, gradually increase activity, Eat a well-balance diet   If the patient is a current  smoker, are they able to teach back resources for cessation? Not a smoker   Is the patient/caregiver able to teach back the hierarchy of who to call/visit for symptoms/problems? PCP, Specialist, Home health nurse, Urgent Care, ED, 911 Yes   Week 2 call completed? Yes   Is the patient interested in additional calls from an ambulatory ?  NOTE:  applies to high risk patients requiring additional follow-up. No   Graduated/Revoked comments Patient reports that she is healing well from her colon surgery. Denies any questions or needs today. Declines need for further follow up calls. Goals met.           MIGUELINA KUHN - Registered Nurse

## 2023-04-09 NOTE — PROGRESS NOTES
"Brittaney Rios MD [facility] Progress Note    Referring Provider: No ref. provider found      Chief complaint   Chief Complaint   Patient presents with   • Post-op        Subjective .     History of present illness:  Hannah Dasilva  is a 89 y.o. female who presents status post diagnostic laparoscopy, laparoscopic lysis of adhesions, exploratory laparotomy, lysis of adhesions, small bowel resection    History    The following portions of the patient's history were reviewed and updated as appropriate: allergies, current medications, past family history, past medical history, past social history, past surgical history, and problem list.    Objective     Vital Signs   /99 (BP Location: Left arm, Patient Position: Sitting, Cuff Size: Adult)   Pulse 74   Ht 172.7 cm (67.99\")   Wt 72.6 kg (160 lb 0.9 oz)   SpO2 100%   BMI 24.34 kg/m²      Physical Exam:    General The patient is well-developed, well-nourished, and in no acute distress.    Abdomen The incision is well-approximated and healing without signs of infection.       BMI is within normal parameters. No other follow-up for BMI required.    Assessment & Plan       Diagnoses and all orders for this visit:    1. Postoperative visit (Primary)    2. Aftercare following surgery    3. S/P small bowel resection           The patient will return prn.      Brittaney Rios MD              "

## 2023-04-10 ENCOUNTER — OFFICE VISIT (OUTPATIENT)
Dept: SURGERY | Facility: CLINIC | Age: 88
End: 2023-04-10
Payer: MEDICARE

## 2023-04-10 ENCOUNTER — PATIENT ROUNDING (BHMG ONLY) (OUTPATIENT)
Dept: SURGERY | Facility: CLINIC | Age: 88
End: 2023-04-10
Payer: MEDICARE

## 2023-04-10 VITALS
HEIGHT: 68 IN | HEART RATE: 74 BPM | BODY MASS INDEX: 24.26 KG/M2 | OXYGEN SATURATION: 100 % | DIASTOLIC BLOOD PRESSURE: 99 MMHG | WEIGHT: 160.05 LBS | SYSTOLIC BLOOD PRESSURE: 156 MMHG

## 2023-04-10 DIAGNOSIS — Z48.89 AFTERCARE FOLLOWING SURGERY: ICD-10-CM

## 2023-04-10 DIAGNOSIS — Z48.89 POSTOPERATIVE VISIT: Primary | ICD-10-CM

## 2023-04-10 DIAGNOSIS — Z90.49 S/P SMALL BOWEL RESECTION: ICD-10-CM

## 2023-04-10 PROCEDURE — 1160F RVW MEDS BY RX/DR IN RCRD: CPT | Performed by: SPECIALIST

## 2023-04-10 PROCEDURE — 1159F MED LIST DOCD IN RCRD: CPT | Performed by: SPECIALIST

## 2023-04-10 PROCEDURE — 99024 POSTOP FOLLOW-UP VISIT: CPT | Performed by: SPECIALIST

## 2023-04-10 NOTE — PROGRESS NOTES
"April 10, 2023    Hello, may I speak with Hannah Dasilva?    My name is Caity    I am  with [facility]  [facility] GENERAL SURGERY  [address]  [facility] [address] [phone].    Before we get started may I verify your date of birth? 11/27/1929    I am calling to officially welcome you to our practice and ask about your recent visit. Is this a good time to talk? yes    Tell me about your visit with us. What things went well?  (Patient is established with Dr. Rios) Patient stated, \"She is special. She is very special.\"       We're always looking for ways to make our patients' experiences even better. Do you have recommendations on ways we may improve?  no    Overall were you satisfied with your first visit to our practice? yes       I appreciate you taking the time to speak with me today. Is there anything else I can do for you? no      Thank you, and have a great day.      "

## 2023-10-18 ENCOUNTER — APPOINTMENT (OUTPATIENT)
Dept: CT IMAGING | Facility: HOSPITAL | Age: 88
End: 2023-10-18
Payer: MEDICARE

## 2023-10-18 ENCOUNTER — HOSPITAL ENCOUNTER (EMERGENCY)
Facility: HOSPITAL | Age: 88
Discharge: HOME | End: 2023-10-18
Attending: EMERGENCY MEDICINE | Admitting: EMERGENCY MEDICINE
Payer: MEDICARE

## 2023-10-18 VITALS
RESPIRATION RATE: 16 BRPM | HEIGHT: 68 IN | HEART RATE: 70 BPM | BODY MASS INDEX: 21.98 KG/M2 | DIASTOLIC BLOOD PRESSURE: 74 MMHG | WEIGHT: 145 LBS | SYSTOLIC BLOOD PRESSURE: 147 MMHG | OXYGEN SATURATION: 97 % | TEMPERATURE: 98.7 F

## 2023-10-18 DIAGNOSIS — J18.9 PNEUMONIA OF RIGHT MIDDLE LOBE DUE TO INFECTIOUS ORGANISM: Primary | ICD-10-CM

## 2023-10-18 LAB
ALBUMIN SERPL-MCNC: 3.8 G/DL (ref 3.5–5.2)
ALBUMIN/GLOB SERPL: 1.2 G/DL
ALP SERPL-CCNC: 80 U/L (ref 39–117)
ALT SERPL W P-5'-P-CCNC: 18 U/L (ref 1–33)
ANION GAP SERPL CALCULATED.3IONS-SCNC: 9 MMOL/L (ref 5–15)
AST SERPL-CCNC: 22 U/L (ref 1–32)
BASOPHILS # BLD AUTO: 0.06 10*3/MM3 (ref 0–0.2)
BASOPHILS NFR BLD AUTO: 0.6 % (ref 0–1.5)
BILIRUB SERPL-MCNC: 0.5 MG/DL (ref 0–1.2)
BILIRUB UR QL STRIP: NEGATIVE
BUN SERPL-MCNC: 14 MG/DL (ref 8–23)
BUN/CREAT SERPL: 24.1 (ref 7–25)
CALCIUM SPEC-SCNC: 8.9 MG/DL (ref 8.2–9.6)
CHLORIDE SERPL-SCNC: 98 MMOL/L (ref 98–107)
CLARITY UR: CLEAR
CO2 SERPL-SCNC: 26 MMOL/L (ref 22–29)
COLOR UR: YELLOW
CREAT SERPL-MCNC: 0.58 MG/DL (ref 0.57–1)
D-LACTATE SERPL-SCNC: 1 MMOL/L (ref 0.5–2)
DEPRECATED RDW RBC AUTO: 47.8 FL (ref 37–54)
EGFRCR SERPLBLD CKD-EPI 2021: 84.5 ML/MIN/1.73
EOSINOPHIL # BLD AUTO: 0.23 10*3/MM3 (ref 0–0.4)
EOSINOPHIL NFR BLD AUTO: 2.1 % (ref 0.3–6.2)
ERYTHROCYTE [DISTWIDTH] IN BLOOD BY AUTOMATED COUNT: 14.6 % (ref 12.3–15.4)
FLUAV RNA RESP QL NAA+PROBE: NOT DETECTED
FLUBV RNA RESP QL NAA+PROBE: NOT DETECTED
GLOBULIN UR ELPH-MCNC: 3.2 GM/DL
GLUCOSE SERPL-MCNC: 148 MG/DL (ref 65–99)
GLUCOSE UR STRIP-MCNC: NEGATIVE MG/DL
HCT VFR BLD AUTO: 36.9 % (ref 34–46.6)
HGB BLD-MCNC: 11.8 G/DL (ref 12–15.9)
HGB UR QL STRIP.AUTO: NEGATIVE
HOLD SPECIMEN: NORMAL
HOLD SPECIMEN: NORMAL
IMM GRANULOCYTES # BLD AUTO: 0.03 10*3/MM3 (ref 0–0.05)
IMM GRANULOCYTES NFR BLD AUTO: 0.3 % (ref 0–0.5)
KETONES UR QL STRIP: NEGATIVE
LEUKOCYTE ESTERASE UR QL STRIP.AUTO: NEGATIVE
LIPASE SERPL-CCNC: 39 U/L (ref 13–60)
LYMPHOCYTES # BLD AUTO: 1.54 10*3/MM3 (ref 0.7–3.1)
LYMPHOCYTES NFR BLD AUTO: 14.1 % (ref 19.6–45.3)
MCH RBC QN AUTO: 28.6 PG (ref 26.6–33)
MCHC RBC AUTO-ENTMCNC: 32 G/DL (ref 31.5–35.7)
MCV RBC AUTO: 89.3 FL (ref 79–97)
MONOCYTES # BLD AUTO: 1.07 10*3/MM3 (ref 0.1–0.9)
MONOCYTES NFR BLD AUTO: 9.8 % (ref 5–12)
NEUTROPHILS NFR BLD AUTO: 7.97 10*3/MM3 (ref 1.7–7)
NEUTROPHILS NFR BLD AUTO: 73.1 % (ref 42.7–76)
NITRITE UR QL STRIP: NEGATIVE
NRBC BLD AUTO-RTO: 0 /100 WBC (ref 0–0.2)
PH UR STRIP.AUTO: 7 [PH] (ref 5–8)
PLATELET # BLD AUTO: 250 10*3/MM3 (ref 140–450)
PMV BLD AUTO: 10.3 FL (ref 6–12)
POTASSIUM SERPL-SCNC: 4 MMOL/L (ref 3.5–5.2)
PROT SERPL-MCNC: 7 G/DL (ref 6–8.5)
PROT UR QL STRIP: NEGATIVE
RBC # BLD AUTO: 4.13 10*6/MM3 (ref 3.77–5.28)
SARS-COV-2 RNA RESP QL NAA+PROBE: NOT DETECTED
SODIUM SERPL-SCNC: 133 MMOL/L (ref 136–145)
SP GR UR STRIP: 1.01 (ref 1–1.03)
TROPONIN T SERPL HS-MCNC: 15 NG/L
TROPONIN T SERPL HS-MCNC: 16 NG/L
UROBILINOGEN UR QL STRIP: NORMAL
WBC NRBC COR # BLD: 10.9 10*3/MM3 (ref 3.4–10.8)
WHOLE BLOOD HOLD COAG: NORMAL
WHOLE BLOOD HOLD SPECIMEN: NORMAL

## 2023-10-18 PROCEDURE — 81003 URINALYSIS AUTO W/O SCOPE: CPT | Performed by: EMERGENCY MEDICINE

## 2023-10-18 PROCEDURE — 83690 ASSAY OF LIPASE: CPT | Performed by: EMERGENCY MEDICINE

## 2023-10-18 PROCEDURE — 25510000001 IOPAMIDOL PER 1 ML: Performed by: EMERGENCY MEDICINE

## 2023-10-18 PROCEDURE — 80053 COMPREHEN METABOLIC PANEL: CPT | Performed by: EMERGENCY MEDICINE

## 2023-10-18 PROCEDURE — 93010 ELECTROCARDIOGRAM REPORT: CPT | Performed by: INTERNAL MEDICINE

## 2023-10-18 PROCEDURE — 93005 ELECTROCARDIOGRAM TRACING: CPT | Performed by: EMERGENCY MEDICINE

## 2023-10-18 PROCEDURE — 96365 THER/PROPH/DIAG IV INF INIT: CPT

## 2023-10-18 PROCEDURE — 36415 COLL VENOUS BLD VENIPUNCTURE: CPT

## 2023-10-18 PROCEDURE — 83605 ASSAY OF LACTIC ACID: CPT | Performed by: EMERGENCY MEDICINE

## 2023-10-18 PROCEDURE — P9612 CATHETERIZE FOR URINE SPEC: HCPCS

## 2023-10-18 PROCEDURE — 84484 ASSAY OF TROPONIN QUANT: CPT | Performed by: EMERGENCY MEDICINE

## 2023-10-18 PROCEDURE — 74177 CT ABD & PELVIS W/CONTRAST: CPT

## 2023-10-18 PROCEDURE — 99285 EMERGENCY DEPT VISIT HI MDM: CPT

## 2023-10-18 PROCEDURE — 25810000003 SODIUM CHLORIDE 0.9 % SOLUTION: Performed by: EMERGENCY MEDICINE

## 2023-10-18 PROCEDURE — 85025 COMPLETE CBC W/AUTO DIFF WBC: CPT | Performed by: EMERGENCY MEDICINE

## 2023-10-18 PROCEDURE — 25010000002 LEVOFLOXACIN PER 250 MG: Performed by: EMERGENCY MEDICINE

## 2023-10-18 PROCEDURE — 71275 CT ANGIOGRAPHY CHEST: CPT

## 2023-10-18 PROCEDURE — 87636 SARSCOV2 & INF A&B AMP PRB: CPT | Performed by: EMERGENCY MEDICINE

## 2023-10-18 RX ORDER — LEVOFLOXACIN 5 MG/ML
750 INJECTION, SOLUTION INTRAVENOUS ONCE
Status: COMPLETED | OUTPATIENT
Start: 2023-10-18 | End: 2023-10-18

## 2023-10-18 RX ORDER — SODIUM CHLORIDE 0.9 % (FLUSH) 0.9 %
10 SYRINGE (ML) INJECTION AS NEEDED
Status: DISCONTINUED | OUTPATIENT
Start: 2023-10-18 | End: 2023-10-18 | Stop reason: HOSPADM

## 2023-10-18 RX ORDER — LEVOFLOXACIN 500 MG/1
500 TABLET, FILM COATED ORAL DAILY
Qty: 10 TABLET | Refills: 0 | Status: SHIPPED | OUTPATIENT
Start: 2023-10-18 | End: 2023-10-28

## 2023-10-18 RX ADMIN — SODIUM CHLORIDE 1000 ML: 9 INJECTION, SOLUTION INTRAVENOUS at 00:38

## 2023-10-18 RX ADMIN — LEVOFLOXACIN 750 MG: 750 INJECTION, SOLUTION INTRAVENOUS at 04:37

## 2023-10-18 RX ADMIN — IOPAMIDOL 100 ML: 755 INJECTION, SOLUTION INTRAVENOUS at 01:56

## 2023-10-18 NOTE — ED PROVIDER NOTES
Subjective   History of Present Illness  Pt presents to the  with report of R lower chest pain - reports has some pain with breathing in this area.  Denies cough/SOB.  No injuries.  Denies n/v/d.  No rashes.  No new foods/meds.          Review of Systems   Constitutional:  Negative for chills and fever.   HENT:  Negative for congestion.    Respiratory:  Negative for cough, chest tightness and shortness of breath.    Cardiovascular:         + R lower rib pain.  + pain with inspiration   Gastrointestinal:  Positive for abdominal pain. Negative for diarrhea, nausea and vomiting.   Genitourinary:  Negative for dysuria.   Musculoskeletal:  Negative for back pain.   Skin:  Negative for rash.   Neurological:  Negative for headaches.   All other systems reviewed and are negative.      Past Medical History:   Diagnosis Date    Diabetes mellitus     Disc degeneration, lumbar     Diverticulosis     Family history of colonic polyps     History of adenomatous polyp of colon     Hypercholesterolemia     Irregular heartbeat     LAFB (left anterior fascicular block) 10/26/2018    MVP (mitral valve prolapse)     Osteoarthritis     Sciatica        Allergies   Allergen Reactions    Penicillins Nausea And Vomiting    Quinine Derivatives Nausea And Vomiting    Tizanidine Other (See Comments)     Weakness, fever    Sulfa Antibiotics Rash       Past Surgical History:   Procedure Laterality Date    APPENDECTOMY      BLADDER REPAIR      CHOLECYSTECTOMY WITH INTRAOPERATIVE CHOLANGIOGRAM N/A 3/31/2020    Procedure: CHOLECYSTECTOMY LAPAROSCOPIC INTRAOPERATIVE CHOLANGIOGRAM;  Surgeon: Jenna Kinney MD;  Location: [facility] OR;  Service: General;  Laterality: N/A;    COLONOSCOPY  02/23/2010    diverticulosis    COLONOSCOPY W/ POLYPECTOMY  11/06/2006    ASCENDING COLON ADENOMATOUS POLYP    DIAGNOSTIC LAPAROSCOPY N/A 3/6/2023    Procedure: DIAGNOSTIC LAPAROSCOPY;  Surgeon: Brittaney Rios MD;  Location:  [facility] OR;  Service: General;   Laterality: N/A;    ENDOSCOPY  10/04/2006    SBBX UNREMARKABLE, SMALL HH, UREA NEG    EXPLORATORY LAPAROTOMY N/A 3/6/2023    Procedure: LAPAROTOMY EXPLORATORY SMALL BOWEL RESECTION;  Surgeon: Brittaney Rios MD;  Location: [facility] OR;  Service: General;  Laterality: N/A;    HYSTERECTOMY      TONSILLECTOMY         Family History   Problem Relation Age of Onset    Colon polyps Son     Stroke Mother     Heart attack Father     GI problems Neg Hx     Colon cancer Neg Hx        Social History     Socioeconomic History    Marital status:    Tobacco Use    Smoking status: Never    Smokeless tobacco: Never   Vaping Use    Vaping Use: Never used   Substance and Sexual Activity    Alcohol use: Yes     Comment: wine occasional    Drug use: No           Objective   Physical Exam  Vitals and nursing note reviewed.   Constitutional:       General: She is not in acute distress.     Appearance: She is well-developed.   HENT:      Head: Normocephalic and atraumatic.   Eyes:      Pupils: Pupils are equal, round, and reactive to light.   Cardiovascular:      Rate and Rhythm: Normal rate and regular rhythm.      Heart sounds: Normal heart sounds.   Pulmonary:      Effort: Pulmonary effort is normal.      Breath sounds: Normal breath sounds. No decreased breath sounds, wheezing, rhonchi or rales.   Chest:      Chest wall: No deformity, tenderness or crepitus.   Abdominal:      General: Bowel sounds are normal.      Palpations: Abdomen is soft.      Comments: Pt with no ttp.  Very soft abdomen on my exam with no ttp.  No g/r   Musculoskeletal:      Comments: Trace bilat LE edema   Skin:     General: Skin is warm and dry.      Capillary Refill: Capillary refill takes less than 2 seconds.   Neurological:      Mental Status: She is alert.         Procedures           ED Course      Labs Reviewed   COMPREHENSIVE METABOLIC PANEL - Abnormal; Notable for the following components:       Result Value    Glucose 148 (*)     Sodium 133 (*)      All other components within normal limits    Narrative:     GFR Normal >60  Chronic Kidney Disease <60  Kidney Failure <15    The GFR formula is only valid for adults with stable renal function between ages 18 and 70.   SINGLE HSTROPONIN T - Abnormal; Notable for the following components:    HS Troponin T 15 (*)     All other components within normal limits    Narrative:     High Sensitive Troponin T Reference Range:  <10.0 ng/L- Negative Female for AMI  <15.0 ng/L- Negative Male for AMI  >=10 - Abnormal Female indicating possible myocardial injury.  >=15 - Abnormal Male indicating possible myocardial injury.   Clinicians would have to utilize clinical acumen, EKG, Troponin, and serial changes to determine if it is an Acute Myocardial Infarction or myocardial injury due to an underlying chronic condition.        CBC WITH AUTO DIFFERENTIAL - Abnormal; Notable for the following components:    WBC 10.90 (*)     Hemoglobin 11.8 (*)     Lymphocyte % 14.1 (*)     Neutrophils, Absolute 7.97 (*)     Monocytes, Absolute 1.07 (*)     All other components within normal limits   SINGLE HSTROPONIN T - Abnormal; Notable for the following components:    HS Troponin T 16 (*)     All other components within normal limits    Narrative:     High Sensitive Troponin T Reference Range:  <10.0 ng/L- Negative Female for AMI  <15.0 ng/L- Negative Male for AMI  >=10 - Abnormal Female indicating possible myocardial injury.  >=15 - Abnormal Male indicating possible myocardial injury.   Clinicians would have to utilize clinical acumen, EKG, Troponin, and serial changes to determine if it is an Acute Myocardial Infarction or myocardial injury due to an underlying chronic condition.        COVID-19 AND FLU A/B, NP SWAB IN TRANSPORT MEDIA 8-12 HR TAT - Normal    Narrative:     Fact sheet for providers: https://www.fda.gov/media/532264/download    Fact sheet for patients: https://www.fda.gov/media/420077/download    Test performed by PCR.    LIPASE - Normal   URINALYSIS W/ MICROSCOPIC IF INDICATED (NO CULTURE) - Normal    Narrative:     Urine microscopic not indicated.   LACTIC ACID, PLASMA - Normal   COVID PRE-OP / PRE-PROCEDURE SCREENING ORDER (NO ISOLATION)    Narrative:     The following orders were created for panel order COVID PRE-OP / PRE-PROCEDURE SCREENING ORDER (NO ISOLATION) - Swab, Nasal Cavity.  Procedure                               Abnormality         Status                     ---------                               -----------         ------                     COVID-19 and FLU A/B PCR...[[id number]]  Normal              Final result                 Please view results for these tests on the individual orders.   RAINBOW DRAW    Narrative:     The following orders were created for panel order [place] Draw.  Procedure                               Abnormality         Status                     ---------                               -----------         ------                     Green Top (Gel)[[id number]]                                  Final result               Lavender Top[[id number]]                                     Final result               Red Top[[id number]]                                          Final result               Light Blue Top[[id number]]                                   Final result                 Please view results for these tests on the individual orders.   GREEN TOP   LAVENDER TOP   RED TOP   LIGHT BLUE TOP   CBC AND DIFFERENTIAL    Narrative:     The following orders were created for panel order CBC & Differential.  Procedure                               Abnormality         Status                     ---------                               -----------         ------                     CBC Auto Differential[[id number]]        Abnormal            Final result                 Please view results for these tests on the individual orders.                                          Medical Decision Making  Pt  stable in EC - resting comfortably att and in NAD. No evid of sepsis.  No respiratory distress. Dbt ACS.  No TAD/PE.  NS abdomen. No evid of obst/perf/ischemia - abdomen remains soft/non-tender.  CT with RML pneumonia - given levaquin IV in EC and will d/c with levaquin PO.  Recommend outpt f/u.      Amount and/or Complexity of Data Reviewed  Labs: ordered.  Radiology: ordered.  ECG/medicine tests: ordered and independent interpretation performed.     Details: NSR, PACs, LAFB, LVH with repol changes, LAD    Risk  Prescription drug management.        Final diagnoses:   Pneumonia of right middle lobe due to infectious organism       ED Disposition  ED Disposition       ED Disposition   Discharge    Condition   Stable    Comment   --               Aga Mcdermott, DO  [address]  [address]  [facility] [id number] [phone]               Medication List        New Prescriptions      levoFLOXacin 500 MG tablet  Commonly known as: LEVAQUIN  Take 1 tablet by mouth Daily for 10 days.               Where to Get Your Medications        These medications were sent to [facility] - [place], [place] - [address] - [phone]  - [phone] FX  [address], [facility] [address]      Phone: [phone]   levoFLOXacin 500 MG tablet            Jose A Chandra,   10/18/23 0023       Jose A Chandra,   10/18/23 0435

## 2023-10-20 LAB
QT INTERVAL: 378 MS
QTC INTERVAL: 462 MS

## 2023-11-30 DIAGNOSIS — R10.2 VAGINAL PAIN: ICD-10-CM

## 2023-11-30 DIAGNOSIS — N95.2 ATROPHIC VAGINITIS: ICD-10-CM

## 2023-11-30 RX ORDER — ESTRADIOL 10 UG/1
INSERT VAGINAL
Qty: 8 TABLET | Refills: 11 | Status: SHIPPED | OUTPATIENT
Start: 2023-11-30

## 2023-11-30 RX ORDER — CONJUGATED ESTROGENS 0.62 MG/G
CREAM VAGINAL
Qty: 30 G | Refills: 5 | Status: SHIPPED | OUTPATIENT
Start: 2023-11-30

## 2023-11-30 NOTE — TELEPHONE ENCOUNTER
Jyoti Roman is requesting a refill of their   Requested Prescriptions     Pending Prescriptions Disp Refills    Estradiol (VAGIFEM) 10 MCG TABS vaginal tablet 8 tablet 11     Sig: .    estrogens conjugated (PREMARIN) 0.625 MG/GM CREA vaginal cream 30 g 5     Sig: INSERT 0.5 GRAMS VAGINALLY TWICE A WEEK   . Please advise. Last Appt:  4/14/2022  Next Appt:   Visit date not found  Preferred pharmacy: [facility], [address] [place] [address] S-D - P [phone] [facility] [phone]    Patient states she called yesterday and the nurse was suppose to call back but she did not hear anything, she is upset because she is out of her medication and sometimes it takes the pharmacy a couple days to because its a compound. She asks for it to be filled as soon as possible.

## 2024-04-26 ENCOUNTER — TRANSCRIBE ORDERS (OUTPATIENT)
Dept: ADMINISTRATIVE | Facility: HOSPITAL | Age: 89
End: 2024-04-26
Payer: MEDICARE

## 2024-04-26 DIAGNOSIS — R91.1 SOLITARY PULMONARY NODULE: Primary | ICD-10-CM

## 2024-05-20 DIAGNOSIS — R19.7 DIARRHEA OF PRESUMED INFECTIOUS ORIGIN: ICD-10-CM

## 2024-05-20 RX ORDER — MONTELUKAST SODIUM 4 MG/1
TABLET, CHEWABLE ORAL
Qty: 120 TABLET | Refills: 10 | Status: SHIPPED | OUTPATIENT
Start: 2024-05-20

## 2024-09-16 ENCOUNTER — TELEPHONE (OUTPATIENT)
Dept: OBGYN CLINIC | Age: 89
End: 2024-09-16

## 2024-09-19 ENCOUNTER — OFFICE VISIT (OUTPATIENT)
Dept: OBGYN CLINIC | Age: 89
End: 2024-09-19
Payer: MEDICARE

## 2024-09-19 VITALS
SYSTOLIC BLOOD PRESSURE: 128 MMHG | DIASTOLIC BLOOD PRESSURE: 78 MMHG | HEART RATE: 69 BPM | WEIGHT: 152 LBS | BODY MASS INDEX: 23.11 KG/M2

## 2024-09-19 DIAGNOSIS — N95.2 ATROPHIC VAGINITIS: ICD-10-CM

## 2024-09-19 DIAGNOSIS — N90.89 VULVAR IRRITATION: Primary | ICD-10-CM

## 2024-09-19 PROCEDURE — 99213 OFFICE O/P EST LOW 20 MIN: CPT | Performed by: NURSE PRACTITIONER

## 2024-09-19 PROCEDURE — 1123F ACP DISCUSS/DSCN MKR DOCD: CPT | Performed by: NURSE PRACTITIONER

## 2024-09-19 PROCEDURE — 1090F PRES/ABSN URINE INCON ASSESS: CPT | Performed by: NURSE PRACTITIONER

## 2024-09-19 PROCEDURE — G8420 CALC BMI NORM PARAMETERS: HCPCS | Performed by: NURSE PRACTITIONER

## 2024-09-19 PROCEDURE — 1036F TOBACCO NON-USER: CPT | Performed by: NURSE PRACTITIONER

## 2024-09-19 PROCEDURE — G8427 DOCREV CUR MEDS BY ELIG CLIN: HCPCS | Performed by: NURSE PRACTITIONER

## 2024-09-19 RX ORDER — CLOTRIMAZOLE AND BETAMETHASONE DIPROPIONATE 10; .64 MG/G; MG/G
CREAM TOPICAL
COMMUNITY
Start: 2024-09-12

## 2024-09-19 RX ORDER — TRAZODONE HYDROCHLORIDE 50 MG/1
50 TABLET, FILM COATED ORAL NIGHTLY
COMMUNITY

## 2024-09-19 ASSESSMENT — ENCOUNTER SYMPTOMS
DIARRHEA: 0
GASTROINTESTINAL NEGATIVE: 1
RESPIRATORY NEGATIVE: 1
CONSTIPATION: 0
ALLERGIC/IMMUNOLOGIC NEGATIVE: 1
EYES NEGATIVE: 1

## 2025-04-03 DIAGNOSIS — R10.2 VAGINAL PAIN: ICD-10-CM

## 2025-04-03 DIAGNOSIS — N95.2 ATROPHIC VAGINITIS: ICD-10-CM

## 2025-04-03 RX ORDER — ESTRADIOL 10 UG/1
TABLET, FILM COATED VAGINAL
Qty: 8 TABLET | Refills: 3 | Status: SHIPPED | OUTPATIENT
Start: 2025-04-03

## 2025-06-02 DIAGNOSIS — R19.7 DIARRHEA OF PRESUMED INFECTIOUS ORIGIN: ICD-10-CM

## 2025-06-02 RX ORDER — COLESTIPOL HYDROCHLORIDE 1 G/1
TABLET ORAL
Qty: 120 TABLET | Refills: 10 | OUTPATIENT
Start: 2025-06-02

## 2025-07-30 DIAGNOSIS — N95.2 ATROPHIC VAGINITIS: ICD-10-CM

## 2025-07-30 DIAGNOSIS — R10.2 VAGINAL PAIN: ICD-10-CM

## 2025-07-30 RX ORDER — ESTRADIOL 10 UG/1
TABLET, FILM COATED VAGINAL
Qty: 8 TABLET | Refills: 0 | Status: SHIPPED | OUTPATIENT
Start: 2025-07-30

## 2025-08-27 DIAGNOSIS — N95.2 ATROPHIC VAGINITIS: ICD-10-CM

## 2025-08-27 DIAGNOSIS — R10.2 VAGINAL PAIN: ICD-10-CM

## 2025-08-27 RX ORDER — ESTRADIOL 10 UG/1
TABLET, FILM COATED VAGINAL
Qty: 8 TABLET | Refills: 0 | Status: SHIPPED | OUTPATIENT
Start: 2025-08-27

## (undated) DEVICE — PAD LAP CHOLE: Brand: MEDLINE INDUSTRIES, INC.

## (undated) DEVICE — PENCL ES MEGADINE EZ/CLEAN BUTN W/HOLSTR 10FT

## (undated) DEVICE — SPNG GZ WOVN 4X4IN 12PLY 10/BX STRL

## (undated) DEVICE — ENSEAL 20 CM SHAFT, LARGE JAW: Brand: ENSEAL X1

## (undated) DEVICE — ENDOPATH XCEL WITH OPTIVIEW TECHNOLOGY UNIVERSAL TROCAR STABILITY SLEEVES: Brand: ENDOPATH XCEL OPTIVIEW

## (undated) DEVICE — ELECTRD L HK EZ CLN 33CM

## (undated) DEVICE — SUT SILK 2/0 SUTUPAK TIES 24IN SA75H

## (undated) DEVICE — ENDOPATH XCEL WITH OPTIVIEW TECHNOLOGY DILATING TIP TROCARS WITH STABILITY SLEEVES: Brand: ENDOPATH XCEL OPTIVIEW

## (undated) DEVICE — ENDOPATH XCEL DILATING TIP TROCARS WITH STABILITY SLEEVES: Brand: ENDOPATH XCEL

## (undated) DEVICE — 2, DISPOSABLE SUCTION/IRRIGATOR WITHOUT DISPOSABLE TIP: Brand: STRYKEFLOW

## (undated) DEVICE — ENDOPATH PNEUMONEEDLE INSUFFLATION NEEDLES WITH LUER LOCK CONNECTORS 120MM: Brand: ENDOPATH

## (undated) DEVICE — SUT SILK 2/0 SH 75CM 30IN BLK C016D

## (undated) DEVICE — ANTIBACTERIAL UNDYED BRAIDED (POLYGLACTIN 910), SYNTHETIC ABSORBABLE SUTURE: Brand: COATED VICRYL

## (undated) DEVICE — ELECTRD BLD EZ CLN MOD XLNG 2.75IN

## (undated) DEVICE — 3M™ STERI-STRIP™ REINFORCED ADHESIVE SKIN CLOSURES, R1546, 1/4 IN X 4 IN (6 MM X 100 MM), 10 STRIPS/ENVELOPE: Brand: 3M™ STERI-STRIP™

## (undated) DEVICE — LAPAROSCOPIC MONOPOLAR CORD: Brand: VALLEYLAB

## (undated) DEVICE — GLV SURG BIOGEL LTX PF 6 1/2

## (undated) DEVICE — ENDOPOUCH RETRIEVER SPECIMEN RETRIEVAL BAGS: Brand: ENDOPOUCH RETRIEVER

## (undated) DEVICE — MONOPOLAR METZENBAUM SCISSOR, MINI BLADE TIP, DISPOSABLE: Brand: MONOPOLAR METZENBAUM SCISSOR, MINI BLADE TIP, DISPOSABLE

## (undated) DEVICE — PROXIMATE RH ROTATING HEAD SKIN STAPLERS (35 WIDE) CONTAINS 35 STAINLESS STEEL STAPLES: Brand: PROXIMATE

## (undated) DEVICE — PK TURNOVER RM ADV

## (undated) DEVICE — SUT PDS LP 1 TP1 96IN VIO PDP880GA

## (undated) DEVICE — CATH CHOLANG MIXTR ENDO 35G 5F 50CM

## (undated) DEVICE — SUT SILK 3/0 SUTUPAK TIES 24IN SA74H

## (undated) DEVICE — 4-PORT MANIFOLD: Brand: NEPTUNE 2

## (undated) DEVICE — 3M™ IOBAN™ 2 ANTIMICROBIAL INCISE DRAPE 6650EZ: Brand: IOBAN™ 2

## (undated) DEVICE — SUT VIC 0 UR6 27IN VCP603H

## (undated) DEVICE — PAD MAJOR: Brand: MEDLINE INDUSTRIES, INC.

## (undated) DEVICE — APPL CHLORAPREP HI/LITE 26ML ORNG

## (undated) DEVICE — TRAP FLD MINIVAC MEGADYNE 100ML

## (undated) DEVICE — TOWEL,OR,DSP,ST,BLUE,STD,4/PK,20PK/CS: Brand: MEDLINE

## (undated) DEVICE — BAPTIST TURNOVER KIT: Brand: MEDLINE INDUSTRIES, INC.

## (undated) DEVICE — PDS II VLT 0 107CM AG ST3: Brand: ENDOLOOP

## (undated) DEVICE — VAGINAL PREP TRAY: Brand: MEDLINE INDUSTRIES, INC.

## (undated) DEVICE — GLV SURG BIOGEL M LTX PF 7 1/2